# Patient Record
Sex: MALE | Race: WHITE | Employment: OTHER | URBAN - METROPOLITAN AREA
[De-identification: names, ages, dates, MRNs, and addresses within clinical notes are randomized per-mention and may not be internally consistent; named-entity substitution may affect disease eponyms.]

---

## 2017-03-23 ENCOUNTER — HOSPITAL ENCOUNTER (OUTPATIENT)
Dept: GENERAL RADIOLOGY | Age: 77
Discharge: HOME | End: 2017-03-23
Attending: INTERNAL MEDICINE
Payer: MEDICARE

## 2017-03-23 ENCOUNTER — HOSPITAL ENCOUNTER (OUTPATIENT)
Dept: NON INVASIVE DIAGNOSTICS | Age: 77
Discharge: HOME | End: 2017-03-23
Attending: INTERNAL MEDICINE
Payer: MEDICARE

## 2017-03-23 VITALS — WEIGHT: 144 LBS | HEIGHT: 68 IN | BODY MASS INDEX: 21.82 KG/M2

## 2017-03-23 DIAGNOSIS — R06.02 SOB (SHORTNESS OF BREATH): ICD-10-CM

## 2017-03-23 DIAGNOSIS — I35.0 AORTIC STENOSIS: ICD-10-CM

## 2017-03-23 LAB
ATTENDING PHYSICIAN, CST07: NORMAL
DIAGNOSIS, 93000: NORMAL
DUKE TM SCORE RESULT, CST14: NORMAL
DUKE TREADMILL SCORE, CST13: NORMAL
ECG INTERP BEFORE EX, CST11: NORMAL
ECG INTERP DURING EX, CST12: NORMAL
FUNCTIONAL CAPACITY, CST17: NORMAL
KNOWN CARDIAC CONDITION, CST08: NORMAL
MAX. DIASTOLIC BP, CST04: 78 MMHG
MAX. HEART RATE, CST05: 78 BPM
MAX. SYSTOLIC BP, CST03: 167 MMHG
OVERALL BP RESPONSE TO EXERCISE, CST16: NORMAL
OVERALL HR RESPONSE TO EXERCISE, CST15: NORMAL
PEAK EX METS, CST10: 1 METS
PROTOCOL NAME, CST01: NORMAL
TEST INDICATION, CST09: NORMAL

## 2017-03-23 PROCEDURE — 71020 XR CHEST PA LAT: CPT

## 2017-03-23 PROCEDURE — 93351 STRESS TTE COMPLETE: CPT

## 2017-03-23 PROCEDURE — 74011250636 HC RX REV CODE- 250/636: Performed by: INTERNAL MEDICINE

## 2017-03-23 RX ORDER — SODIUM CHLORIDE 0.9 % (FLUSH) 0.9 %
10 SYRINGE (ML) INJECTION
Status: COMPLETED | OUTPATIENT
Start: 2017-03-23 | End: 2017-03-23

## 2017-03-23 RX ORDER — DOBUTAMINE HYDROCHLORIDE 200 MG/100ML
10-40 INJECTION INTRAVENOUS
Status: COMPLETED | OUTPATIENT
Start: 2017-03-23 | End: 2017-03-23

## 2017-03-23 RX ADMIN — Medication 10 ML: at 09:59

## 2017-03-23 RX ADMIN — DOBUTAMINE HYDROCHLORIDE 6.2 MCG/KG/MIN: 200 INJECTION INTRAVENOUS at 09:59

## 2017-07-19 ENCOUNTER — TELEPHONE (OUTPATIENT)
Dept: CARDIAC REHAB | Age: 77
End: 2017-07-19

## 2017-07-19 NOTE — TELEPHONE ENCOUNTER
7/19/2017 Cardiac Wellness: Called Mr. Sukhjinder Parks to remind of intake appointment on Thursday, July 20, 2017. Left voicemail message. Provided pt with contact information for [facility]. Also, reminded pt to bring a list of current medications, a personal schedule, and to wear comfortable clothes and shoes.  Hugo Mendiola

## 2017-07-20 ENCOUNTER — HOSPITAL ENCOUNTER (OUTPATIENT)
Dept: CARDIAC REHAB | Age: 77
Discharge: HOME | End: 2017-07-20
Payer: MEDICARE

## 2017-07-20 VITALS — HEIGHT: 68 IN | WEIGHT: 138 LBS | BODY MASS INDEX: 20.92 KG/M2

## 2017-07-20 VITALS — WEIGHT: 138 LBS | BODY MASS INDEX: 21.29 KG/M2

## 2017-07-20 PROCEDURE — 93798 PHYS/QHP OP CAR RHAB W/ECG: CPT

## 2017-07-20 RX ORDER — ATORVASTATIN CALCIUM 20 MG/1
20 TABLET, FILM COATED ORAL DAILY
COMMUNITY
End: 2018-05-17 | Stop reason: DRUGHIGH

## 2017-07-20 RX ORDER — CARVEDILOL 12.5 MG/1
12.5 TABLET ORAL 2 TIMES DAILY WITH MEALS
COMMUNITY

## 2017-07-20 RX ORDER — COLCHICINE 0.6 MG/1
0.6 TABLET ORAL AS NEEDED
COMMUNITY

## 2017-07-20 RX ORDER — POTASSIUM CITRATE 15 MEQ/1
20 TABLET, EXTENDED RELEASE ORAL
COMMUNITY
End: 2017-07-20 | Stop reason: CLARIF

## 2017-07-20 RX ORDER — FUROSEMIDE 20 MG/1
20 TABLET ORAL
COMMUNITY

## 2017-07-20 RX ORDER — POTASSIUM CHLORIDE 1500 MG/1
20 TABLET, FILM COATED, EXTENDED RELEASE ORAL
COMMUNITY

## 2017-07-20 RX ORDER — LISINOPRIL 5 MG/1
5 TABLET ORAL DAILY
COMMUNITY

## 2017-07-20 RX ORDER — EZETIMIBE 10 MG/1
10 TABLET ORAL DAILY
COMMUNITY

## 2017-07-20 RX ORDER — PRASUGREL 10 MG/1
10 TABLET, FILM COATED ORAL DAILY
COMMUNITY

## 2017-07-20 RX ORDER — GUAIFENESIN 100 MG/5ML
LIQUID (ML) ORAL DAILY
COMMUNITY

## 2017-07-20 NOTE — CARDIO/PULMONARY
Janell Barnhart  67 y.o. presented to cardiac wellness for orientation and exercise tolerance test today with a primary diagnosis of TAVR 06/30/17. His EF is 25 -30%, refuses ICD. Janell Barnhart has a cardiac history of MI '89, CABG x 4 2009, HF - ischemic CMP, PAD with bilat. stents, mild bilateral carotid stenosis. Cardiac risk factors include dyslipidemia, hypertension, prior MI, valvular heart disease and these were reviewed with Pia Grace. Janell Barnhart lives with his supportive wife. He is a retired professor and an avid cyclist.    ARIANNE-D, depression score, is 1 and this is considered normal.  Patient denied chest pain or SOB during 6 minute walk and was in SB/SR 1 AVB BBB w/o ectopy. Janell Barnhart will attend a 60 minute class once a week and exercise 3 days a week in cardiac wellness. He attended our cardiac rehab program in 2009.    Magda Tucker RN  7/20/2017

## 2017-07-24 ENCOUNTER — APPOINTMENT (OUTPATIENT)
Dept: CARDIAC REHAB | Age: 77
End: 2017-07-24
Payer: MEDICARE

## 2017-07-25 ENCOUNTER — HOSPITAL ENCOUNTER (OUTPATIENT)
Dept: CARDIAC REHAB | Age: 77
Discharge: HOME | End: 2017-07-25
Payer: MEDICARE

## 2017-07-25 VITALS — BODY MASS INDEX: 21.45 KG/M2 | WEIGHT: 139 LBS

## 2017-07-25 PROCEDURE — 93798 PHYS/QHP OP CAR RHAB W/ECG: CPT

## 2017-07-27 ENCOUNTER — HOSPITAL ENCOUNTER (OUTPATIENT)
Dept: CARDIAC REHAB | Age: 77
Discharge: HOME | End: 2017-07-27
Payer: MEDICARE

## 2017-07-27 VITALS — WEIGHT: 138 LBS | BODY MASS INDEX: 21.29 KG/M2

## 2017-07-27 PROCEDURE — 93797 PHYS/QHP OP CAR RHAB WO ECG: CPT | Performed by: DIETITIAN, REGISTERED

## 2017-07-27 PROCEDURE — 93798 PHYS/QHP OP CAR RHAB W/ECG: CPT

## 2017-08-01 ENCOUNTER — HOSPITAL ENCOUNTER (OUTPATIENT)
Dept: CARDIAC REHAB | Age: 77
Discharge: HOME | End: 2017-08-01
Payer: MEDICARE

## 2017-08-01 VITALS — BODY MASS INDEX: 21.14 KG/M2 | WEIGHT: 137 LBS

## 2017-08-01 PROCEDURE — 93798 PHYS/QHP OP CAR RHAB W/ECG: CPT

## 2017-08-03 ENCOUNTER — HOSPITAL ENCOUNTER (OUTPATIENT)
Dept: CARDIAC REHAB | Age: 77
Discharge: HOME | End: 2017-08-03
Payer: MEDICARE

## 2017-08-03 VITALS — BODY MASS INDEX: 21.29 KG/M2 | WEIGHT: 138 LBS

## 2017-08-03 PROCEDURE — 93797 PHYS/QHP OP CAR RHAB WO ECG: CPT | Performed by: DIETITIAN, REGISTERED

## 2017-08-03 PROCEDURE — 93798 PHYS/QHP OP CAR RHAB W/ECG: CPT | Performed by: DIETITIAN, REGISTERED

## 2017-08-07 ENCOUNTER — HOSPITAL ENCOUNTER (OUTPATIENT)
Dept: CARDIAC REHAB | Age: 77
Discharge: HOME | End: 2017-08-07
Payer: MEDICARE

## 2017-08-07 VITALS — BODY MASS INDEX: 21.14 KG/M2 | WEIGHT: 137 LBS

## 2017-08-07 PROCEDURE — 93798 PHYS/QHP OP CAR RHAB W/ECG: CPT

## 2017-08-08 ENCOUNTER — HOSPITAL ENCOUNTER (OUTPATIENT)
Dept: CARDIAC REHAB | Age: 77
Discharge: HOME | End: 2017-08-08
Payer: MEDICARE

## 2017-08-08 VITALS — WEIGHT: 138 LBS | BODY MASS INDEX: 21.29 KG/M2

## 2017-08-08 PROCEDURE — 93798 PHYS/QHP OP CAR RHAB W/ECG: CPT

## 2017-08-10 ENCOUNTER — HOSPITAL ENCOUNTER (OUTPATIENT)
Dept: CARDIAC REHAB | Age: 77
Discharge: HOME | End: 2017-08-10
Payer: MEDICARE

## 2017-08-10 VITALS — WEIGHT: 138 LBS | BODY MASS INDEX: 21.29 KG/M2

## 2017-08-10 PROCEDURE — 93798 PHYS/QHP OP CAR RHAB W/ECG: CPT

## 2017-08-10 PROCEDURE — 93797 PHYS/QHP OP CAR RHAB WO ECG: CPT | Performed by: DIETITIAN, REGISTERED

## 2017-08-14 ENCOUNTER — HOSPITAL ENCOUNTER (OUTPATIENT)
Dept: CARDIAC REHAB | Age: 77
Discharge: HOME | End: 2017-08-14
Payer: MEDICARE

## 2017-08-14 VITALS — WEIGHT: 138 LBS | BODY MASS INDEX: 21.29 KG/M2

## 2017-08-14 PROCEDURE — 93798 PHYS/QHP OP CAR RHAB W/ECG: CPT

## 2017-08-15 ENCOUNTER — HOSPITAL ENCOUNTER (OUTPATIENT)
Dept: CARDIAC REHAB | Age: 77
Discharge: HOME | End: 2017-08-15
Payer: MEDICARE

## 2017-08-15 VITALS — WEIGHT: 138 LBS | BODY MASS INDEX: 21.29 KG/M2

## 2017-08-15 PROCEDURE — 93798 PHYS/QHP OP CAR RHAB W/ECG: CPT

## 2017-08-17 ENCOUNTER — HOSPITAL ENCOUNTER (OUTPATIENT)
Dept: CARDIAC REHAB | Age: 77
Discharge: HOME | End: 2017-08-17
Payer: MEDICARE

## 2017-08-17 VITALS — WEIGHT: 138 LBS | BODY MASS INDEX: 21.29 KG/M2

## 2017-08-17 PROCEDURE — 93798 PHYS/QHP OP CAR RHAB W/ECG: CPT | Performed by: DIETITIAN, REGISTERED

## 2017-08-17 PROCEDURE — 93797 PHYS/QHP OP CAR RHAB WO ECG: CPT

## 2017-08-21 ENCOUNTER — APPOINTMENT (OUTPATIENT)
Dept: CARDIAC REHAB | Age: 77
End: 2017-08-21
Payer: MEDICARE

## 2017-08-22 ENCOUNTER — APPOINTMENT (OUTPATIENT)
Dept: CARDIAC REHAB | Age: 77
End: 2017-08-22
Payer: MEDICARE

## 2017-08-24 ENCOUNTER — HOSPITAL ENCOUNTER (OUTPATIENT)
Dept: CARDIAC REHAB | Age: 77
Discharge: HOME | End: 2017-08-24
Payer: MEDICARE

## 2017-08-24 VITALS — WEIGHT: 139 LBS | BODY MASS INDEX: 21.45 KG/M2

## 2017-08-24 PROCEDURE — 93797 PHYS/QHP OP CAR RHAB WO ECG: CPT

## 2017-08-24 PROCEDURE — 93798 PHYS/QHP OP CAR RHAB W/ECG: CPT

## 2017-08-28 ENCOUNTER — HOSPITAL ENCOUNTER (OUTPATIENT)
Dept: CARDIAC REHAB | Age: 77
Discharge: HOME | End: 2017-08-28
Payer: MEDICARE

## 2017-08-28 VITALS — BODY MASS INDEX: 21.29 KG/M2 | WEIGHT: 138 LBS

## 2017-08-28 PROCEDURE — 93798 PHYS/QHP OP CAR RHAB W/ECG: CPT

## 2017-08-29 ENCOUNTER — HOSPITAL ENCOUNTER (OUTPATIENT)
Dept: CARDIAC REHAB | Age: 77
Discharge: HOME | End: 2017-08-29
Payer: MEDICARE

## 2017-08-29 VITALS — BODY MASS INDEX: 21.45 KG/M2 | WEIGHT: 139 LBS

## 2017-08-29 PROCEDURE — 93798 PHYS/QHP OP CAR RHAB W/ECG: CPT

## 2017-08-31 ENCOUNTER — HOSPITAL ENCOUNTER (OUTPATIENT)
Dept: CARDIAC REHAB | Age: 77
Discharge: HOME | End: 2017-08-31
Payer: MEDICARE

## 2017-08-31 VITALS — BODY MASS INDEX: 21.29 KG/M2 | WEIGHT: 138 LBS

## 2017-08-31 PROCEDURE — 93798 PHYS/QHP OP CAR RHAB W/ECG: CPT | Performed by: DIETITIAN, REGISTERED

## 2017-08-31 PROCEDURE — 93797 PHYS/QHP OP CAR RHAB WO ECG: CPT | Performed by: DIETITIAN, REGISTERED

## 2017-09-05 ENCOUNTER — HOSPITAL ENCOUNTER (OUTPATIENT)
Dept: CARDIAC REHAB | Age: 77
Discharge: HOME | End: 2017-09-05
Payer: MEDICARE

## 2017-09-05 ENCOUNTER — APPOINTMENT (OUTPATIENT)
Dept: CARDIAC REHAB | Age: 77
End: 2017-09-05
Payer: MEDICARE

## 2017-09-05 VITALS — WEIGHT: 139 LBS | BODY MASS INDEX: 21.45 KG/M2

## 2017-09-05 PROCEDURE — 93798 PHYS/QHP OP CAR RHAB W/ECG: CPT

## 2017-09-07 ENCOUNTER — HOSPITAL ENCOUNTER (OUTPATIENT)
Dept: CARDIAC REHAB | Age: 77
Discharge: HOME | End: 2017-09-07
Payer: MEDICARE

## 2017-09-07 VITALS — BODY MASS INDEX: 21.29 KG/M2 | WEIGHT: 138 LBS

## 2017-09-07 PROCEDURE — 93798 PHYS/QHP OP CAR RHAB W/ECG: CPT | Performed by: DIETITIAN, REGISTERED

## 2017-09-07 PROCEDURE — 93797 PHYS/QHP OP CAR RHAB WO ECG: CPT | Performed by: DIETITIAN, REGISTERED

## 2017-09-11 ENCOUNTER — HOSPITAL ENCOUNTER (OUTPATIENT)
Dept: CARDIAC REHAB | Age: 77
Discharge: HOME | End: 2017-09-11
Payer: MEDICARE

## 2017-09-11 VITALS — WEIGHT: 139 LBS | BODY MASS INDEX: 21.45 KG/M2

## 2017-09-11 PROCEDURE — 93798 PHYS/QHP OP CAR RHAB W/ECG: CPT

## 2017-09-11 NOTE — CARDIO/PULMONARY
Pt came in to exercise today and reported that on his 25 mile bike ride this past Friday he ran into an issue the last 4 miles. He experienced sudden fatigue, that he \"ran out of gas. \" Happened to look down and his fit bit reported a HR in the 50's, normally >100. Took his medications, ate, was hydrated. Pt did another 25 mile bike ride on Sunday and felt fine. Instructed pt to report this to Dr. Lopez Ellison (pt's cardiologist) and pt agreed to call today.

## 2017-09-12 ENCOUNTER — HOSPITAL ENCOUNTER (OUTPATIENT)
Dept: CARDIAC REHAB | Age: 77
Discharge: HOME | End: 2017-09-12
Payer: MEDICARE

## 2017-09-12 ENCOUNTER — APPOINTMENT (OUTPATIENT)
Dept: CARDIAC REHAB | Age: 77
End: 2017-09-12
Payer: MEDICARE

## 2017-09-12 VITALS — WEIGHT: 139 LBS | BODY MASS INDEX: 21.45 KG/M2

## 2017-09-12 PROCEDURE — 93798 PHYS/QHP OP CAR RHAB W/ECG: CPT

## 2017-09-14 ENCOUNTER — HOSPITAL ENCOUNTER (OUTPATIENT)
Dept: CARDIAC REHAB | Age: 77
Discharge: HOME | End: 2017-09-14
Payer: MEDICARE

## 2017-09-14 VITALS — BODY MASS INDEX: 21.29 KG/M2 | WEIGHT: 138 LBS

## 2017-09-14 PROCEDURE — 93798 PHYS/QHP OP CAR RHAB W/ECG: CPT | Performed by: DIETITIAN, REGISTERED

## 2017-09-14 PROCEDURE — 93797 PHYS/QHP OP CAR RHAB WO ECG: CPT

## 2017-09-18 ENCOUNTER — HOSPITAL ENCOUNTER (OUTPATIENT)
Dept: CARDIAC REHAB | Age: 77
Discharge: HOME | End: 2017-09-18
Payer: MEDICARE

## 2017-09-18 VITALS — WEIGHT: 137 LBS | BODY MASS INDEX: 21.14 KG/M2

## 2017-09-18 PROCEDURE — 93798 PHYS/QHP OP CAR RHAB W/ECG: CPT

## 2017-09-19 ENCOUNTER — HOSPITAL ENCOUNTER (OUTPATIENT)
Dept: CARDIAC REHAB | Age: 77
Discharge: HOME | End: 2017-09-19
Payer: MEDICARE

## 2017-09-19 ENCOUNTER — APPOINTMENT (OUTPATIENT)
Dept: CARDIAC REHAB | Age: 77
End: 2017-09-19
Payer: MEDICARE

## 2017-09-19 VITALS — BODY MASS INDEX: 21.14 KG/M2 | WEIGHT: 137 LBS

## 2017-09-19 PROCEDURE — 93798 PHYS/QHP OP CAR RHAB W/ECG: CPT

## 2017-09-21 ENCOUNTER — HOSPITAL ENCOUNTER (OUTPATIENT)
Dept: CARDIAC REHAB | Age: 77
Discharge: HOME | End: 2017-09-21
Payer: MEDICARE

## 2017-09-21 VITALS — BODY MASS INDEX: 21.14 KG/M2 | WEIGHT: 137 LBS

## 2017-09-21 PROCEDURE — 93797 PHYS/QHP OP CAR RHAB WO ECG: CPT | Performed by: DIETITIAN, REGISTERED

## 2017-09-21 PROCEDURE — 93798 PHYS/QHP OP CAR RHAB W/ECG: CPT | Performed by: DIETITIAN, REGISTERED

## 2017-09-21 NOTE — CARDIO/PULMONARY
The following was faxed to Dr. Wan Caldwell:    Dr. Wan Caldwell,    Mr. Kayla Soriano (s/p TAVR 6/30/17) attends cardiac rehab. He has had more PVCs recently than usual. Asymptomatic, BP pre-exercise 100/60, post 94/53. Currently taking:  Coreg 12.5mg BID  Lisinopril 5mg daily  Lasix 20mg PRN  - Pt takes only the coreg before exercising    Attached is todays session report for you to review. Wanted you to be aware of pts rhythm change.      Thank you,    Jagdeep Mcleod RN

## 2017-09-25 ENCOUNTER — HOSPITAL ENCOUNTER (OUTPATIENT)
Dept: CARDIAC REHAB | Age: 77
Discharge: HOME | End: 2017-09-25
Payer: MEDICARE

## 2017-09-25 VITALS — BODY MASS INDEX: 21.29 KG/M2 | WEIGHT: 138 LBS

## 2017-09-25 PROCEDURE — 93798 PHYS/QHP OP CAR RHAB W/ECG: CPT

## 2017-09-26 ENCOUNTER — HOSPITAL ENCOUNTER (OUTPATIENT)
Dept: CARDIAC REHAB | Age: 77
Discharge: HOME | End: 2017-09-26
Payer: MEDICARE

## 2017-09-26 ENCOUNTER — APPOINTMENT (OUTPATIENT)
Dept: CARDIAC REHAB | Age: 77
End: 2017-09-26
Payer: MEDICARE

## 2017-09-26 VITALS — WEIGHT: 139 LBS | BODY MASS INDEX: 21.45 KG/M2

## 2017-09-26 PROCEDURE — 93798 PHYS/QHP OP CAR RHAB W/ECG: CPT

## 2017-09-28 ENCOUNTER — HOSPITAL ENCOUNTER (OUTPATIENT)
Dept: CARDIAC REHAB | Age: 77
Discharge: HOME | End: 2017-09-28
Payer: MEDICARE

## 2017-09-28 VITALS — BODY MASS INDEX: 21.45 KG/M2 | WEIGHT: 139 LBS

## 2017-09-28 PROCEDURE — 93798 PHYS/QHP OP CAR RHAB W/ECG: CPT

## 2017-10-02 ENCOUNTER — TELEPHONE (OUTPATIENT)
Dept: CARDIAC REHAB | Age: 77
End: 2017-10-02

## 2017-10-02 ENCOUNTER — HOSPITAL ENCOUNTER (OUTPATIENT)
Dept: CARDIAC REHAB | Age: 77
Discharge: HOME | End: 2017-10-02
Payer: MEDICARE

## 2017-10-02 VITALS — WEIGHT: 140 LBS | BODY MASS INDEX: 21.6 KG/M2

## 2017-10-02 NOTE — TELEPHONE ENCOUNTER
10/2/17 Cardiac Wellness: Mr. Iram Dalton canceled October 9, 2017 appointment d/t out of town. Will return next scheduled appointment.  Gui De La Torre

## 2017-10-03 ENCOUNTER — APPOINTMENT (OUTPATIENT)
Dept: CARDIAC REHAB | Age: 77
End: 2017-10-03
Payer: MEDICARE

## 2017-10-03 ENCOUNTER — HOSPITAL ENCOUNTER (OUTPATIENT)
Dept: CARDIAC REHAB | Age: 77
Discharge: HOME | End: 2017-10-03
Payer: MEDICARE

## 2017-10-05 ENCOUNTER — HOSPITAL ENCOUNTER (OUTPATIENT)
Dept: CARDIAC REHAB | Age: 77
Discharge: HOME | End: 2017-10-05
Payer: MEDICARE

## 2017-10-05 VITALS — BODY MASS INDEX: 21.76 KG/M2 | WEIGHT: 141 LBS

## 2017-10-09 ENCOUNTER — APPOINTMENT (OUTPATIENT)
Dept: CARDIAC REHAB | Age: 77
End: 2017-10-09
Payer: MEDICARE

## 2017-10-10 ENCOUNTER — APPOINTMENT (OUTPATIENT)
Dept: CARDIAC REHAB | Age: 77
End: 2017-10-10
Payer: MEDICARE

## 2017-10-10 ENCOUNTER — HOSPITAL ENCOUNTER (OUTPATIENT)
Dept: CARDIAC REHAB | Age: 77
Discharge: HOME | End: 2017-10-10
Payer: MEDICARE

## 2017-10-10 VITALS — WEIGHT: 140 LBS | BODY MASS INDEX: 21.6 KG/M2

## 2017-10-12 ENCOUNTER — HOSPITAL ENCOUNTER (OUTPATIENT)
Dept: CARDIAC REHAB | Age: 77
Discharge: HOME | End: 2017-10-12
Payer: MEDICARE

## 2017-10-12 ENCOUNTER — APPOINTMENT (OUTPATIENT)
Dept: CARDIAC REHAB | Age: 77
End: 2017-10-12
Payer: MEDICARE

## 2017-10-12 VITALS — BODY MASS INDEX: 21.6 KG/M2 | WEIGHT: 140 LBS

## 2017-10-16 ENCOUNTER — APPOINTMENT (OUTPATIENT)
Dept: CARDIAC REHAB | Age: 77
End: 2017-10-16
Payer: MEDICARE

## 2017-10-17 ENCOUNTER — HOSPITAL ENCOUNTER (OUTPATIENT)
Dept: CARDIAC REHAB | Age: 77
Discharge: HOME | End: 2017-10-17
Payer: MEDICARE

## 2017-10-17 VITALS — WEIGHT: 140 LBS | BODY MASS INDEX: 21.6 KG/M2

## 2017-10-17 VITALS — BODY MASS INDEX: 21.76 KG/M2 | WEIGHT: 141 LBS

## 2017-10-19 ENCOUNTER — HOSPITAL ENCOUNTER (OUTPATIENT)
Dept: CARDIAC REHAB | Age: 77
Discharge: HOME | End: 2017-10-19
Payer: MEDICARE

## 2017-10-19 VITALS — WEIGHT: 140 LBS | BODY MASS INDEX: 21.6 KG/M2

## 2017-10-23 ENCOUNTER — APPOINTMENT (OUTPATIENT)
Dept: CARDIAC REHAB | Age: 77
End: 2017-10-23
Payer: MEDICARE

## 2017-10-24 ENCOUNTER — HOSPITAL ENCOUNTER (OUTPATIENT)
Dept: CARDIAC REHAB | Age: 77
Discharge: HOME | End: 2017-10-24
Payer: MEDICARE

## 2017-10-24 VITALS — WEIGHT: 140 LBS | BODY MASS INDEX: 21.6 KG/M2

## 2017-10-26 ENCOUNTER — HOSPITAL ENCOUNTER (OUTPATIENT)
Dept: CARDIAC REHAB | Age: 77
Discharge: HOME | End: 2017-10-26
Payer: MEDICARE

## 2017-10-26 VITALS — WEIGHT: 138 LBS | BODY MASS INDEX: 21.29 KG/M2

## 2017-10-30 ENCOUNTER — APPOINTMENT (OUTPATIENT)
Dept: CARDIAC REHAB | Age: 77
End: 2017-10-30
Payer: MEDICARE

## 2017-10-31 ENCOUNTER — APPOINTMENT (OUTPATIENT)
Dept: CARDIAC REHAB | Age: 77
End: 2017-10-31
Payer: MEDICARE

## 2017-10-31 ENCOUNTER — HOSPITAL ENCOUNTER (OUTPATIENT)
Dept: CARDIAC REHAB | Age: 77
Discharge: HOME | End: 2017-10-31
Payer: MEDICARE

## 2017-10-31 VITALS — WEIGHT: 138 LBS | BODY MASS INDEX: 21.29 KG/M2

## 2017-10-31 PROCEDURE — 93798 PHYS/QHP OP CAR RHAB W/ECG: CPT

## 2017-10-31 NOTE — CARDIO/PULMONARY
Gabbie Silva  Completed phase II cardiac rehab and attended 36 sessions from 7/20/17 - 10/31/17. Gabbie Silva is interested in maintaining optimal health and will work with Dr. Vivien Hernandez MD.  Gabbie Silva has  improved his endurance and stamina through regular exercise, maintained his weight which part of his individualized nutrition plan, and lost 1.25 inches from his waist. Blood pressure is 100/58 and is WNL. He has also improved his nutrition, [employer] and depression scores and these were reviewed with patient. Gabbie Silva plans to continue exercising at his gym and plans to do regularly biking outside.   Goldie Key RN  10/31/2017

## 2017-11-02 ENCOUNTER — APPOINTMENT (OUTPATIENT)
Dept: CARDIAC REHAB | Age: 77
End: 2017-11-02

## 2017-11-02 ENCOUNTER — TELEPHONE (OUTPATIENT)
Dept: CARDIAC REHAB | Age: 77
End: 2017-11-02

## 2017-11-02 NOTE — TELEPHONE ENCOUNTER
11/2/2017 Cardiac Wellness:  Mr. Beth Sher called and left a voicemail Monday to cancel starting Phase 3 until January.   Rakesh Grimm

## 2017-11-07 ENCOUNTER — APPOINTMENT (OUTPATIENT)
Dept: CARDIAC REHAB | Age: 77
End: 2017-11-07

## 2017-11-09 ENCOUNTER — APPOINTMENT (OUTPATIENT)
Dept: CARDIAC REHAB | Age: 77
End: 2017-11-09

## 2017-11-14 ENCOUNTER — APPOINTMENT (OUTPATIENT)
Dept: CARDIAC REHAB | Age: 77
End: 2017-11-14

## 2017-11-16 ENCOUNTER — APPOINTMENT (OUTPATIENT)
Dept: CARDIAC REHAB | Age: 77
End: 2017-11-16

## 2017-11-21 ENCOUNTER — APPOINTMENT (OUTPATIENT)
Dept: CARDIAC REHAB | Age: 77
End: 2017-11-21

## 2017-11-28 ENCOUNTER — APPOINTMENT (OUTPATIENT)
Dept: CARDIAC REHAB | Age: 77
End: 2017-11-28

## 2017-11-30 ENCOUNTER — APPOINTMENT (OUTPATIENT)
Dept: CARDIAC REHAB | Age: 77
End: 2017-11-30

## 2018-05-17 ENCOUNTER — HOSPITAL ENCOUNTER (OUTPATIENT)
Dept: LAB | Age: 78
Discharge: HOME | End: 2018-05-17
Payer: MEDICARE

## 2018-05-17 ENCOUNTER — OFFICE VISIT (OUTPATIENT)
Dept: INTERNAL MEDICINE CLINIC | Age: 78
End: 2018-05-17

## 2018-05-17 VITALS
WEIGHT: 143.2 LBS | OXYGEN SATURATION: 98 % | HEART RATE: 59 BPM | HEIGHT: 67 IN | BODY MASS INDEX: 22.47 KG/M2 | RESPIRATION RATE: 17 BRPM | DIASTOLIC BLOOD PRESSURE: 70 MMHG | TEMPERATURE: 97.4 F | SYSTOLIC BLOOD PRESSURE: 120 MMHG

## 2018-05-17 DIAGNOSIS — Z95.2 S/P TAVR (TRANSCATHETER AORTIC VALVE REPLACEMENT): ICD-10-CM

## 2018-05-17 DIAGNOSIS — Z76.89 ENCOUNTER TO ESTABLISH CARE: Primary | ICD-10-CM

## 2018-05-17 DIAGNOSIS — I25.10 CORONARY ARTERY DISEASE INVOLVING NATIVE CORONARY ARTERY OF NATIVE HEART WITHOUT ANGINA PECTORIS: ICD-10-CM

## 2018-05-17 DIAGNOSIS — Z13.29 THYROID DISORDER SCREEN: ICD-10-CM

## 2018-05-17 PROCEDURE — 85025 COMPLETE CBC W/AUTO DIFF WBC: CPT

## 2018-05-17 PROCEDURE — 36415 COLL VENOUS BLD VENIPUNCTURE: CPT

## 2018-05-17 PROCEDURE — 84443 ASSAY THYROID STIM HORMONE: CPT

## 2018-05-17 PROCEDURE — 80061 LIPID PANEL: CPT

## 2018-05-17 PROCEDURE — 80053 COMPREHEN METABOLIC PANEL: CPT

## 2018-05-17 RX ORDER — ATORVASTATIN CALCIUM 40 MG/1
TABLET, FILM COATED ORAL
COMMUNITY
Start: 2018-04-20

## 2018-05-17 NOTE — MR AVS SNAPSHOT
[address] [facility] [address] [facility] 
[phone] Patient: Yung Oliva MRN: [id number] TYV:[id number]/3/6939 Visit Information Date & Time Provider Department Dept. Phone Encounter #  
 5/17/2018  9:30 AM Zee Duffy MD [facility] Internal Medicine [phone] [id number] Follow-up Instructions Return in about 2 months (around 7/17/2018) for Medicare Wellness Visit- 30 minute appointment. Your Appointments 7/16/2018 10:15 AM  
[facility] [address] ([address]) Appt Note: new pt physical  
 [id number] [address] [id number]  
[phone]  
  
   
 [id number] [address] [facility] [address] [id number] Upcoming Health Maintenance Date Due DTaP/Tdap/Td series (1 - Tdap) 9/3/1960 ZOSTER VACCINE AGE 60> 7/3/1999 MEDICARE YEARLY EXAM 3/20/2018 Influenza Age 5 to Adult 8/1/2018 GLAUCOMA SCREENING Q2Y 12/20/2019 Pneumococcal 65+ Low/Medium Risk (2 of 2 - PPSV23) 11/1/2021 Allergies as of 5/17/2018  Review Complete On: 5/17/2018 By: Zee Duffy MD  
  
 Severity Noted Reaction Type Reactions Niacin High 07/20/2017    Other (comments) Severe flushing Current Immunizations  Reviewed on 7/20/2017 Name Date Influenza Vaccine 10/1/2016 Pneumococcal Vaccine (Unspecified Type) 11/1/2016 Not reviewed this visit You Were Diagnosed With   
  
 Codes Comments Encounter to establish care    -  Primary ICD-10-CM: Z76.89 
ICD-9-CM: V65.8 Coronary artery disease involving native coronary artery of native heart without angina pectoris     ICD-10-CM: I25.10 ICD-9-CM: 414.01 S/P TAVR (transcatheter aortic valve replacement)     ICD-10-CM: H32.5 ICD-9-CM: V43.3 Thyroid disorder screen     ICD-10-CM: Z13.29 ICD-9-CM: V77.0 Vitals BP Pulse Temp Resp Height(growth percentile) Weight(growth percentile) 120/70 (BP 1 Location: Right arm, BP Patient Position: Sitting) (!) 59 97.4 °F (36.3 °C) (Oral) 17 5' 6.77\" (1.696 m) 143 lb 3.2 oz (65 kg) SpO2 BMI Smoking Status 98% 22.58 kg/m2 Never Smoker BMI and BSA Data Body Mass Index Body Surface Area  
 22.58 kg/m 2 1.75 m 2 Preferred Pharmacy Pharmacy Name Phone [id number] - [facility], [address] [place] [address] [phone] Your Updated Medication List  
  
   
This list is accurate as of 5/17/18 10:01 AM.  Always use your most recent med list.  
  
  
  
  
 aspirin 81 mg chewable tablet Take  by mouth daily. atorvastatin 40 mg tablet Commonly known as:  LIPITOR  
  
 carvedilol 12.5 mg tablet Commonly known as:  Marielena Stephanie Take 12.5 mg by mouth two (2) times daily (with meals). COLCRYS 0.6 mg tablet Generic drug:  colchicine Take 0.6 mg by mouth as needed (for gout). COQ10 (LIPOSOMAL UBIQUINOL) PO Take  by mouth. EFFIENT 10 mg tablet Generic drug:  prasugrel Take 10 mg by mouth daily. furosemide 20 mg tablet Commonly known as:  LASIX Take 20 mg by mouth daily as needed. lisinopril 5 mg tablet Commonly known as:  Daron Littler Take 5 mg by mouth daily. potassium chloride SR 20 mEq tablet Commonly known as:  K-TAB Take 20 mEq by mouth daily as needed (takes with lasix). ZETIA 10 mg tablet Generic drug:  ezetimibe Take 10 mg by mouth daily. We Performed the Following CBC WITH AUTOMATED DIFF [05376 CPT(R)] LIPID PANEL [56915 CPT(R)] METABOLIC PANEL, COMPREHENSIVE [14334 CPT(R)] TSH 3RD GENERATION [74643 CPT(R)] Follow-up Instructions Return in about 2 months (around 7/17/2018) for Medicare Wellness Visit- 30 minute appointment. Introducing [address]! Ling Lyon introduces [employer] patient portal. Now you can access parts of your medical record, email your doctor's office, and request medication refills online. 1. In your internet browser, go to [email or web address] 2. Click on the First Time User? Click Here link in the Sign In box. You will see the New Member Sign Up page. 3. Enter your [employer] Access Code exactly as it appears below. You will not need to use this code after youve completed the sign-up process. If you do not sign up before the expiration date, you must request a new code. · [employer] Access Code: [email or web address] Expires: 8/15/2018  9:17 AM 
 
4. Enter the last four digits of your Social Security Number (xxxx) and Date of Birth (mm/dd/yyyy) as indicated and click Submit. You will be taken to the next sign-up page. 5. Create a [employer] ID. This will be your [employer] login ID and cannot be changed, so think of one that is secure and easy to remember. 6. Create a [employer] password. You can change your password at any time. 7. Enter your Password Reset Question and Answer. This can be used at a later time if you forget your password. 8. Enter your e-mail address. You will receive e-mail notification when new information is available in [address] 9. Click Sign Up. You can now view and download portions of your medical record. 10. Click the Download Summary menu link to download a portable copy of your medical information. If you have questions, please visit the Frequently Asked Questions section of the [employer] website. Remember, [employer] is NOT to be used for urgent needs. For medical emergencies, dial 911. Now available from your iPhone and Android! Please provide this summary of care documentation to your next provider. Your primary care clinician is listed as Cara Irizarry. If you have any questions after today's visit, please call [phone].

## 2018-05-17 NOTE — PROGRESS NOTES
New Patient Evaluation    Fatimah Soto is a 65 y.o. male. They are here to establish care with the group and me as a primary care provider. He has a history of CAD. Had a quad bypass. 3-4 years ago had stents placed in both legs. Last year he had had an aortic bypass. He has done well since this. No present complaints. He had a hernia operation 6-7 years ago. He has had colonoscopies. Last was at 75. He will not require another. He has some gout. Controlled with colchicine when it flares. Has gotten Shingrix last year    There are no active problems to display for this patient. Current Outpatient Prescriptions   Medication Sig Dispense Refill    COQ10, LIPOSOMAL UBIQUINOL, PO Take  by mouth.  atorvastatin (LIPITOR) 40 mg tablet       aspirin 81 mg chewable tablet Take  by mouth daily.  carvedilol (COREG) 12.5 mg tablet Take 12.5 mg by mouth two (2) times daily (with meals).  colchicine (COLCRYS) 0.6 mg tablet Take 0.6 mg by mouth as needed (for gout).  prasugrel (EFFIENT) 10 mg tablet Take 10 mg by mouth daily.  furosemide (LASIX) 20 mg tablet Take 20 mg by mouth daily as needed.  lisinopril (PRINIVIL, ZESTRIL) 5 mg tablet Take 5 mg by mouth daily.  ezetimibe (ZETIA) 10 mg tablet Take 10 mg by mouth daily.  potassium chloride SR (K-TAB) 20 mEq tablet Take 20 mEq by mouth daily as needed (takes with lasix).        Allergies   Allergen Reactions    Niacin Other (comments)     Severe flushing       Past Medical History:   Diagnosis Date    Aortic stenosis     CAD (coronary artery disease) 1989    MI    Carotid stenosis     mild bilateral    Heart failure ([address]     ischemic cardiomyopathy    Hypertension      Past Surgical History:   Procedure Laterality Date    CABG, ARTERY-VEIN, FOUR  2009    CARDIAC SURG PROCEDURE UNLIST  06/30/2017    TAVR    HX HERNIA REPAIR  2012    VASCULAR SURGERY PROCEDURE UNLIST  2014    bilat popliteal stenting Family History   Problem Relation Age of Onset   [employer] Sudden Death Sister      cause unknown    Lung Disease Mother     Hypertension Father      Social History   Substance Use Topics    Smoking status: Never Smoker    Smokeless tobacco: Never Used    Alcohol use Yes      Comment: socially        Health Maintenance   Topic Date Due    DTaP/Tdap/Td series (1 - Tdap) 09/03/1960    ZOSTER VACCINE AGE 60>  07/03/1999    GLAUCOMA SCREENING Q2Y  09/03/2004    MEDICARE YEARLY EXAM  03/20/2018    Influenza Age 5 to Adult  08/01/2018    Pneumococcal 65+ Low/Medium Risk (2 of 2 - PPSV23) 11/01/2021       Review of Systems   Constitutional: Negative. Respiratory: Negative. Cardiovascular: Negative. Genitourinary: Negative. Visit Vitals    /70 (BP 1 Location: Right arm, BP Patient Position: Sitting)    Pulse (!) 59    Temp 97.4 °F (36.3 °C) (Oral)    Resp 17    Ht 5' 6.77\" (1.696 m)    Wt 143 lb 3.2 oz (65 kg)    SpO2 98%    BMI 22.58 kg/m2       Physical Exam   Constitutional: No distress. Cardiovascular: Normal rate and regular rhythm. Pulmonary/Chest: Effort normal and breath sounds normal.           ASSESSMENT/PLAN    Diagnoses and all orders for this visit:    1. Encounter to establish care    2. Coronary artery disease involving native coronary artery of native heart without angina pectoris  -     CBC WITH AUTOMATED DIFF  -     METABOLIC PANEL, COMPREHENSIVE  -     LIPID PANEL    3. S/P TAVR (transcatheter aortic valve replacement)    4. Thyroid disorder screen  -     TSH 3RD GENERATION      Follow-up Disposition:  Return in about 2 months (around 7/17/2018) for Medicare Wellness Visit- 30 minute appointment.    -Discussed with the patient to continue the current plan of care. We will obtain baseline labwork and determine if any adjustments need to be done. We will also await the records of the previous PCP to ascertain further details of the patient's history.  The patient agrees with and understands the plan of care. All questions have been answered.

## 2018-05-18 LAB
ALBUMIN SERPL-MCNC: 4.1 G/DL (ref 3.5–4.8)
ALBUMIN/GLOB SERPL: 1.5 {RATIO} (ref 1.2–2.2)
ALP SERPL-CCNC: 60 IU/L (ref 39–117)
ALT SERPL-CCNC: 45 IU/L (ref 0–44)
AST SERPL-CCNC: 50 IU/L (ref 0–40)
BASOPHILS # BLD AUTO: 0 X10E3/UL (ref 0–0.2)
BASOPHILS NFR BLD AUTO: 0 %
BILIRUB SERPL-MCNC: 0.6 MG/DL (ref 0–1.2)
BUN SERPL-MCNC: 25 MG/DL (ref 8–27)
BUN/CREAT SERPL: 23 (ref 10–24)
CALCIUM SERPL-MCNC: 9.2 MG/DL (ref 8.6–10.2)
CHLORIDE SERPL-SCNC: 103 MMOL/L (ref 96–106)
CHOLEST SERPL-MCNC: 131 MG/DL (ref 100–199)
CO2 SERPL-SCNC: 22 MMOL/L (ref 18–29)
CREAT SERPL-MCNC: 1.11 MG/DL (ref 0.76–1.27)
EOSINOPHIL # BLD AUTO: 0.4 X10E3/UL (ref 0–0.4)
EOSINOPHIL NFR BLD AUTO: 5 %
ERYTHROCYTE [DISTWIDTH] IN BLOOD BY AUTOMATED COUNT: 14.1 % (ref 12.3–15.4)
GFR SERPLBLD CREATININE-BSD FMLA CKD-EPI: 63 ML/MIN/1.73
GFR SERPLBLD CREATININE-BSD FMLA CKD-EPI: 73 ML/MIN/1.73
GLOBULIN SER CALC-MCNC: 2.8 G/DL (ref 1.5–4.5)
GLUCOSE SERPL-MCNC: 73 MG/DL (ref 65–99)
HCT VFR BLD AUTO: 41 % (ref 37.5–51)
HDLC SERPL-MCNC: 40 MG/DL
HGB BLD-MCNC: 13.5 G/DL (ref 13–17.7)
IMM GRANULOCYTES # BLD: 0 X10E3/UL (ref 0–0.1)
IMM GRANULOCYTES NFR BLD: 0 %
LDLC SERPL CALC-MCNC: 64 MG/DL (ref 0–99)
LYMPHOCYTES # BLD AUTO: 1.1 X10E3/UL (ref 0.7–3.1)
LYMPHOCYTES NFR BLD AUTO: 16 %
MCH RBC QN AUTO: 29.9 PG (ref 26.6–33)
MCHC RBC AUTO-ENTMCNC: 32.9 G/DL (ref 31.5–35.7)
MCV RBC AUTO: 91 FL (ref 79–97)
MONOCYTES # BLD AUTO: 0.8 X10E3/UL (ref 0.1–0.9)
MONOCYTES NFR BLD AUTO: 12 %
NEUTROPHILS # BLD AUTO: 4.5 X10E3/UL (ref 1.4–7)
NEUTROPHILS NFR BLD AUTO: 67 %
PLATELET # BLD AUTO: 195 X10E3/UL (ref 150–379)
POTASSIUM SERPL-SCNC: 4.4 MMOL/L (ref 3.5–5.2)
PROT SERPL-MCNC: 6.9 G/DL (ref 6–8.5)
RBC # BLD AUTO: 4.52 X10E6/UL (ref 4.14–5.8)
SODIUM SERPL-SCNC: 142 MMOL/L (ref 134–144)
TRIGL SERPL-MCNC: 134 MG/DL (ref 0–149)
TSH SERPL DL<=0.005 MIU/L-ACNC: 1.93 UIU/ML (ref 0.45–4.5)
VLDLC SERPL CALC-MCNC: 27 MG/DL (ref 5–40)
WBC # BLD AUTO: 6.8 X10E3/UL (ref 3.4–10.8)

## 2018-06-08 PROBLEM — I25.10 CORONARY ARTERY DISEASE INVOLVING NATIVE CORONARY ARTERY OF NATIVE HEART WITHOUT ANGINA PECTORIS: Status: ACTIVE | Noted: 2018-06-08

## 2018-06-08 PROBLEM — Z95.2 S/P TAVR (TRANSCATHETER AORTIC VALVE REPLACEMENT): Status: ACTIVE | Noted: 2018-06-08

## 2018-07-12 ENCOUNTER — OFFICE VISIT (OUTPATIENT)
Dept: INTERNAL MEDICINE CLINIC | Age: 78
End: 2018-07-12

## 2018-07-12 VITALS
DIASTOLIC BLOOD PRESSURE: 80 MMHG | RESPIRATION RATE: 17 BRPM | WEIGHT: 140.2 LBS | HEIGHT: 67 IN | TEMPERATURE: 97.3 F | HEART RATE: 63 BPM | SYSTOLIC BLOOD PRESSURE: 110 MMHG | BODY MASS INDEX: 22 KG/M2 | OXYGEN SATURATION: 96 %

## 2018-07-12 DIAGNOSIS — Z00.00 MEDICARE ANNUAL WELLNESS VISIT, SUBSEQUENT: Primary | ICD-10-CM

## 2018-07-12 NOTE — PROGRESS NOTES
This is a Subsequent Medicare Annual Wellness Visit providing Personalized Prevention Plan Services (PPPS) (Performed 12 months after initial AWV and PPPS )    I have reviewed the patient's medical history in detail and updated the computerized patient record. The patient reports no significant complaints today. He sees and follows up with cardiology regularly. He sees Dr. Yandel Savage for follow-up. He denies chest pain or shortness of breath at this time. We reviewed his health maintenance issues that are outstanding. Orders placed appropriately. History     Past Medical History:   Diagnosis Date    Aortic stenosis     CAD (coronary artery disease) 1989    MI    Carotid stenosis     mild bilateral    Heart failure (HCC)     ischemic cardiomyopathy    Hypertension       Past Surgical History:   Procedure Laterality Date    CABG, ARTERY-VEIN, FOUR  2009    CARDIAC SURG PROCEDURE UNLIST  06/30/2017    TAVR    HX HERNIA REPAIR  2012    VASCULAR SURGERY PROCEDURE UNLIST  2014    bilat popliteal stenting     Current Outpatient Prescriptions   Medication Sig Dispense Refill    COQ10, LIPOSOMAL UBIQUINOL, PO Take  by mouth.  atorvastatin (LIPITOR) 40 mg tablet       aspirin 81 mg chewable tablet Take  by mouth daily.  carvedilol (COREG) 12.5 mg tablet Take 12.5 mg by mouth two (2) times daily (with meals).  colchicine (COLCRYS) 0.6 mg tablet Take 0.6 mg by mouth as needed (for gout).  prasugrel (EFFIENT) 10 mg tablet Take 10 mg by mouth daily.  furosemide (LASIX) 20 mg tablet Take 20 mg by mouth daily as needed.  lisinopril (PRINIVIL, ZESTRIL) 5 mg tablet Take 5 mg by mouth daily.  ezetimibe (ZETIA) 10 mg tablet Take 10 mg by mouth daily.  potassium chloride SR (K-TAB) 20 mEq tablet Take 20 mEq by mouth daily as needed (takes with lasix).        Allergies   Allergen Reactions    Niacin Other (comments)     Severe flushing       Family History   Problem Relation Age of Onset   [facility] Sudden Death Sister      cause unknown    Lung Disease Mother     Hypertension Father      Social History   Substance Use Topics    Smoking status: Never Smoker    Smokeless tobacco: Never Used    Alcohol use Yes      Comment: socially     Patient Active Problem List   Diagnosis Code    Coronary artery disease involving native coronary artery of native heart without angina pectoris I25.10    S/P TAVR (transcatheter aortic valve replacement) Z95.2       Depression Risk Factor Screening:     PHQ over the last two weeks 5/17/2018   Little interest or pleasure in doing things Not at all   Feeling down, depressed or hopeless Not at all   Total Score PHQ 2 0     Alcohol Risk Factor Screening: You do not drink alcohol or very rarely. Functional Ability and Level of Safety:     Hearing Loss   Hearing is good. Activities of Daily Living   Self-care. Requires assistance with: no ADLs    Fall Risk     Fall Risk Assessment, last 12 mths 5/17/2018   Able to walk? Yes   Fall in past 12 months? No     Abuse Screen   Patient is not abused    Review of Systems   A comprehensive review of systems was negative except for that written in the HPI.     Physical Examination     Evaluation of Cognitive Function:  Mood/affect:  happy  Appearance: age appropriate  Family member/caregiver input: n/a    Visit Vitals    /80 (BP 1 Location: Right arm, BP Patient Position: Sitting)    Pulse 63    Temp 97.3 °F (36.3 °C) (Oral)    Resp 17    Ht 5' 6.77\" (1.696 m)    Wt 140 lb 3.2 oz (63.6 kg)    SpO2 96%    BMI 22.11 kg/m2     General appearance: alert, cooperative, no distress, appears stated age  Lungs: clear to auscultation bilaterally  Heart: regular rate and rhythm, S1, S2 normal, no murmur, click, rub or gallop    Patient Care Team:  Aggie Gates MD as PCP - General (Internal Medicine)    Advice/Referrals/Counseling   Education and counseling provided:  Are appropriate based on today's review and evaluation      Assessment/Plan   Diagnoses and all orders for this visit:    1. Medicare annual wellness visit, subsequent  -     diphtheria-pertussis, acellular,-tetanus (BOOSTRIX TDAP) 2.5-8-5 Lf-mcg-Lf/0.5mL susp susp; 0.5 mL by IntraMUSCular route once for 1 dose.  -     varicella-zoster recombinant, PF, (SHINGRIX, PF,) 50 mcg/0.5 mL susr injection; 0.5 mL by IntraMUSCular route once for 1 dose. Follow-up Disposition:  Return in about 6 months (around 1/12/2019). Jayde Luna

## 2019-03-21 ENCOUNTER — HOSPITAL ENCOUNTER (OUTPATIENT)
Dept: MRI IMAGING | Age: 79
Discharge: HOME | End: 2019-03-21
Attending: FAMILY MEDICINE
Payer: MEDICARE

## 2019-03-21 DIAGNOSIS — M12.812 ROTATOR CUFF ARTHROPATHY OF LEFT SHOULDER: ICD-10-CM

## 2019-03-21 DIAGNOSIS — M19.012 PRIMARY OSTEOARTHRITIS OF LEFT SHOULDER: ICD-10-CM

## 2019-03-21 PROCEDURE — 73221 MRI JOINT UPR EXTREM W/O DYE: CPT

## 2019-09-03 ENCOUNTER — OFFICE VISIT (OUTPATIENT)
Dept: INTERNAL MEDICINE CLINIC | Age: 79
End: 2019-09-03

## 2019-09-03 VITALS
RESPIRATION RATE: 18 BRPM | HEIGHT: 67 IN | OXYGEN SATURATION: 97 % | WEIGHT: 142 LBS | TEMPERATURE: 97.4 F | SYSTOLIC BLOOD PRESSURE: 102 MMHG | BODY MASS INDEX: 22.29 KG/M2 | HEART RATE: 72 BPM | DIASTOLIC BLOOD PRESSURE: 72 MMHG

## 2019-09-03 DIAGNOSIS — Z00.00 MEDICARE ANNUAL WELLNESS VISIT, SUBSEQUENT: Primary | ICD-10-CM

## 2019-09-03 NOTE — PROGRESS NOTES
1. Have you been to the ER, urgent care clinic since your last visit? Hospitalized since your last visit? No    2. Have you seen or consulted any other health care providers outside of the [address] [place] [address] since your last visit? Include any pap smears or colon screening.  No

## 2019-09-03 NOTE — PATIENT INSTRUCTIONS
Medicare Wellness Visit, Male    The best way to live healthy is to have a lifestyle where you eat a well-balanced diet, exercise regularly, limit alcohol use, and quit all forms of tobacco/nicotine, if applicable. Regular preventive services are another way to keep healthy. Preventive services (vaccines, screening tests, monitoring & exams) can help personalize your care plan, which helps you manage your own care. Screening tests can find health problems at the earliest stages, when they are easiest to treat. [facility] follows the current, evidence-based guidelines published by the [facility] Surinder ([facility]) when recommending preventive services for our patients. Because we follow these guidelines, sometimes recommendations change over time as research supports it. (For example, a prostate screening blood test is no longer routinely recommended for men with no symptoms.)  Of course, you and your doctor may decide to screen more often for some diseases, based on your risk and co-morbidities (chronic disease you are already diagnosed with). Preventive services for you include:  - Medicare offers their members a free annual wellness visit, which is time for you and your primary care provider to discuss and plan for your preventive service needs. Take advantage of this benefit every year!  -All adults over age 72 should receive the recommended pneumonia vaccines. Current USPSTF guidelines recommend a series of two vaccines for the best pneumonia protection.   -All adults should have a flu vaccine yearly and an ECG.  All adults age 61 and older should receive a shingles vaccine once in their lifetime.    -All adults age 38-68 who are overweight should have a diabetes screening test once every three years.   -Other screening tests & preventive services for persons with diabetes include: an eye exam to screen for diabetic retinopathy, a kidney function test, a foot exam, and stricter control over your cholesterol.   -Cardiovascular screening for adults with routine risk involves an electrocardiogram (ECG) at intervals determined by the provider.   -Colorectal cancer screening should be done for adults age 54-65 with no increased risk factors for colorectal cancer. There are a number of acceptable methods of screening for this type of cancer. Each test has its own benefits and drawbacks. Discuss with your provider what is most appropriate for you during your annual wellness visit. The different tests include: colonoscopy (considered the best screening method), a fecal occult blood test, a fecal DNA test, and sigmoidoscopy.  -All adults born between [facility] should be screened once for Hepatitis C.  -An Abdominal Aortic Aneurysm (AAA) Screening is recommended for men age 73-68 who has ever smoked in their lifetime.      Here is a list of your current Health Maintenance items (your personalized list of preventive services) with a due date:  Health Maintenance Due   Topic Date Due    Shingles Vaccine (2 of 2) 09/25/2018    Annual Well Visit  07/13/2019    Flu Vaccine  08/01/2019

## 2019-09-03 NOTE — PROGRESS NOTES
This is the Subsequent Medicare Annual Wellness Exam, performed 12 months or more after the Initial AWV or the last Subsequent AWV    I have reviewed the patient's medical history in detail and updated the computerized patient record. Feeling well, no complaints. He has seen cardiology recently. Per Dr. Angella De Jesus, he is well compensated and doing well with his CHF. He turns [de-identified] y/o today. He is active, bicycles regularly and has traveled extensively this summer. Reviewed health maintenance. He will need to restart his Shingrix series as he had one shot last July but did not get the second. History     Past Medical History:   Diagnosis Date    Aortic stenosis     CAD (coronary artery disease) 1989    MI    Carotid stenosis     mild bilateral    Heart failure (HCC)     ischemic cardiomyopathy    Hypertension       Past Surgical History:   Procedure Laterality Date    CABG, ARTERY-VEIN, FOUR  2009    CARDIAC SURG PROCEDURE UNLIST  06/30/2017    TAVR    HX HERNIA REPAIR  2012    VASCULAR SURGERY PROCEDURE UNLIST  2014    bilat popliteal stenting     Current Outpatient Medications   Medication Sig Dispense Refill    COQ10, LIPOSOMAL UBIQUINOL, PO Take  by mouth.  atorvastatin (LIPITOR) 40 mg tablet       aspirin 81 mg chewable tablet Take  by mouth daily.  carvedilol (COREG) 12.5 mg tablet Take 12.5 mg by mouth two (2) times daily (with meals).  colchicine (COLCRYS) 0.6 mg tablet Take 0.6 mg by mouth as needed (for gout).  prasugrel (EFFIENT) 10 mg tablet Take 10 mg by mouth daily.  furosemide (LASIX) 20 mg tablet Take 20 mg by mouth daily as needed.  lisinopril (PRINIVIL, ZESTRIL) 5 mg tablet Take 5 mg by mouth daily.  ezetimibe (ZETIA) 10 mg tablet Take 10 mg by mouth daily.  potassium chloride SR (K-TAB) 20 mEq tablet Take 20 mEq by mouth daily as needed (takes with lasix).        Allergies   Allergen Reactions    Niacin Other (comments)     Severe flushing       Family History   Problem Relation Age of Onset   Margie Nixon Sudden Death Sister         cause unknown    Lung Disease Mother     Hypertension Father      Social History     Tobacco Use    Smoking status: Never Smoker    Smokeless tobacco: Never Used   Substance Use Topics    Alcohol use: Yes     Comment: socially     Patient Active Problem List   Diagnosis Code    Coronary artery disease involving native coronary artery of native heart without angina pectoris I25.10    S/P TAVR (transcatheter aortic valve replacement) Z95.2       Depression Risk Factor Screening:     3 most recent PHQ Screens 9/3/2019   Little interest or pleasure in doing things Not at all   Feeling down, depressed, irritable, or hopeless Not at all   Total Score PHQ 2 0     Alcohol Risk Factor Screening: On any occasion in the past three months he has not had more than 7 drinks containing alcohol    Functional Ability and Level of Safety:   Hearing Loss  The patient wears hearing aids. Activities of Daily Living  The home contains: no safety equipment. Patient does total self care    Fall Risk  Fall Risk Assessment, last 12 mths 9/3/2019   Able to walk? Yes   Fall in past 12 months? No       Abuse Screen  Patient is not abused    Cognitive Screening   Evaluation of Cognitive Function:  Has your family/caregiver stated any concerns about your memory: no  Normal    Patient Care Team   Patient Care Team:  Alley Ty MD as PCP - General (Internal Medicine)    Assessment/Plan   Education and counseling provided:  Are appropriate based on today's review and evaluation    Diagnoses and all orders for this visit:    1.  Medicare annual wellness visit, subsequent  -     CBC WITH AUTOMATED DIFF  -     METABOLIC PANEL, COMPREHENSIVE        Health Maintenance Due   Topic Date Due    Shingrix Vaccine Age 49> (2 of 2) 09/25/2018    MEDICARE YEARLY EXAM  07/13/2019    Influenza Age 9 to Adult  08/01/2019       Follow-up and Dispositions    · Return in about 6 months (around 3/3/2020), or if symptoms worsen or fail to improve.

## 2019-09-04 LAB
ALBUMIN SERPL-MCNC: 4.1 G/DL (ref 3.5–4.7)
ALBUMIN/GLOB SERPL: 1.6 {RATIO} (ref 1.2–2.2)
ALP SERPL-CCNC: 88 IU/L (ref 39–117)
ALT SERPL-CCNC: 22 IU/L (ref 0–44)
AST SERPL-CCNC: 35 IU/L (ref 0–40)
BASOPHILS # BLD AUTO: 0 X10E3/UL (ref 0–0.2)
BASOPHILS NFR BLD AUTO: 1 %
BILIRUB SERPL-MCNC: 0.8 MG/DL (ref 0–1.2)
BUN SERPL-MCNC: 23 MG/DL (ref 8–27)
BUN/CREAT SERPL: 21 (ref 10–24)
CALCIUM SERPL-MCNC: 9.2 MG/DL (ref 8.6–10.2)
CHLORIDE SERPL-SCNC: 105 MMOL/L (ref 96–106)
CO2 SERPL-SCNC: 21 MMOL/L (ref 20–29)
CREAT SERPL-MCNC: 1.11 MG/DL (ref 0.76–1.27)
EOSINOPHIL # BLD AUTO: 0.2 X10E3/UL (ref 0–0.4)
EOSINOPHIL NFR BLD AUTO: 2 %
ERYTHROCYTE [DISTWIDTH] IN BLOOD BY AUTOMATED COUNT: 14.3 % (ref 12.3–15.4)
GLOBULIN SER CALC-MCNC: 2.5 G/DL (ref 1.5–4.5)
GLUCOSE SERPL-MCNC: 91 MG/DL (ref 65–99)
HCT VFR BLD AUTO: 40.6 % (ref 37.5–51)
HGB BLD-MCNC: 12.7 G/DL (ref 13–17.7)
IMM GRANULOCYTES # BLD AUTO: 0 X10E3/UL (ref 0–0.1)
IMM GRANULOCYTES NFR BLD AUTO: 0 %
LYMPHOCYTES # BLD AUTO: 1.7 X10E3/UL (ref 0.7–3.1)
LYMPHOCYTES NFR BLD AUTO: 21 %
MCH RBC QN AUTO: 28.9 PG (ref 26.6–33)
MCHC RBC AUTO-ENTMCNC: 31.3 G/DL (ref 31.5–35.7)
MCV RBC AUTO: 92 FL (ref 79–97)
MONOCYTES # BLD AUTO: 0.6 X10E3/UL (ref 0.1–0.9)
MONOCYTES NFR BLD AUTO: 8 %
NEUTROPHILS # BLD AUTO: 5.5 X10E3/UL (ref 1.4–7)
NEUTROPHILS NFR BLD AUTO: 68 %
PLATELET # BLD AUTO: 206 X10E3/UL (ref 150–450)
POTASSIUM SERPL-SCNC: 4.8 MMOL/L (ref 3.5–5.2)
PROT SERPL-MCNC: 6.6 G/DL (ref 6–8.5)
RBC # BLD AUTO: 4.4 X10E6/UL (ref 4.14–5.8)
SODIUM SERPL-SCNC: 143 MMOL/L (ref 134–144)
WBC # BLD AUTO: 8.1 X10E3/UL (ref 3.4–10.8)

## 2019-12-17 ENCOUNTER — TELEPHONE (OUTPATIENT)
Dept: INTERNAL MEDICINE CLINIC | Age: 79
End: 2019-12-17

## 2019-12-17 NOTE — TELEPHONE ENCOUNTER
Patient states blood in semen, started about year ago. brownish when ejaculating. No pain, no urinary frequency. No back or abdominal pain. Patient has not seen urology.

## 2019-12-17 NOTE — TELEPHONE ENCOUNTER
Informed patient per provider, Dr Xiomara Alexis [facility] for further evaluation.  Understanding verbalized

## 2021-01-01 ENCOUNTER — APPOINTMENT (OUTPATIENT)
Dept: CT IMAGING | Age: 81
DRG: 308 | End: 2021-01-01
Attending: HEALTH CARE PROVIDER
Payer: MEDICARE

## 2021-01-01 ENCOUNTER — APPOINTMENT (OUTPATIENT)
Dept: NON INVASIVE DIAGNOSTICS | Age: 81
DRG: 308 | End: 2021-01-01
Attending: HEALTH CARE PROVIDER
Payer: MEDICARE

## 2021-01-01 ENCOUNTER — APPOINTMENT (OUTPATIENT)
Dept: CT IMAGING | Age: 81
DRG: 308 | End: 2021-01-01
Attending: STUDENT IN AN ORGANIZED HEALTH CARE EDUCATION/TRAINING PROGRAM
Payer: MEDICARE

## 2021-01-01 ENCOUNTER — APPOINTMENT (OUTPATIENT)
Dept: GENERAL RADIOLOGY | Age: 81
DRG: 308 | End: 2021-01-01
Attending: SURGERY
Payer: MEDICARE

## 2021-01-01 ENCOUNTER — APPOINTMENT (OUTPATIENT)
Dept: MRI IMAGING | Age: 81
DRG: 308 | End: 2021-01-01
Attending: SURGERY
Payer: MEDICARE

## 2021-01-01 ENCOUNTER — APPOINTMENT (OUTPATIENT)
Dept: CT IMAGING | Age: 81
DRG: 308 | End: 2021-01-01
Attending: SURGERY
Payer: MEDICARE

## 2021-01-01 ENCOUNTER — APPOINTMENT (OUTPATIENT)
Dept: GENERAL RADIOLOGY | Age: 81
DRG: 308 | End: 2021-01-01
Attending: INTERNAL MEDICINE
Payer: MEDICARE

## 2021-01-01 ENCOUNTER — APPOINTMENT (OUTPATIENT)
Dept: GENERAL RADIOLOGY | Age: 81
DRG: 308 | End: 2021-01-01
Attending: STUDENT IN AN ORGANIZED HEALTH CARE EDUCATION/TRAINING PROGRAM
Payer: MEDICARE

## 2021-01-01 ENCOUNTER — HOSPICE ADMISSION (OUTPATIENT)
Dept: HOSPICE | Facility: HOSPICE | Age: 81
End: 2021-01-01

## 2021-01-01 ENCOUNTER — HOSPITAL ENCOUNTER (INPATIENT)
Age: 81
LOS: 11 days | DRG: 308 | End: 2021-04-09
Attending: STUDENT IN AN ORGANIZED HEALTH CARE EDUCATION/TRAINING PROGRAM | Admitting: HEALTH CARE PROVIDER
Payer: MEDICARE

## 2021-01-01 VITALS
SYSTOLIC BLOOD PRESSURE: 103 MMHG | TEMPERATURE: 99.4 F | WEIGHT: 162.26 LBS | HEIGHT: 71 IN | BODY MASS INDEX: 22.72 KG/M2 | HEART RATE: 75 BPM | RESPIRATION RATE: 22 BRPM | DIASTOLIC BLOOD PRESSURE: 67 MMHG | OXYGEN SATURATION: 92 %

## 2021-01-01 DIAGNOSIS — I46.9 CARDIAC ARREST (HCC): Primary | ICD-10-CM

## 2021-01-01 DIAGNOSIS — G93.1 SEVERE ANOXIC-ISCHEMIC ENCEPHALOPATHY (HCC): ICD-10-CM

## 2021-01-01 DIAGNOSIS — I25.5 ISCHEMIC CARDIOMYOPATHY: ICD-10-CM

## 2021-01-01 DIAGNOSIS — R55 CONVULSIVE SYNCOPE: ICD-10-CM

## 2021-01-01 DIAGNOSIS — R41.89 UNRESPONSIVE: ICD-10-CM

## 2021-01-01 DIAGNOSIS — I67.82 SEVERE ANOXIC-ISCHEMIC ENCEPHALOPATHY (HCC): ICD-10-CM

## 2021-01-01 LAB
ALBUMIN SERPL-MCNC: 2.6 G/DL (ref 3.5–5)
ALBUMIN SERPL-MCNC: 2.8 G/DL (ref 3.5–5)
ALBUMIN/GLOB SERPL: 0.8 {RATIO} (ref 1.1–2.2)
ALBUMIN/GLOB SERPL: 0.9 {RATIO} (ref 1.1–2.2)
ALP SERPL-CCNC: 104 U/L (ref 45–117)
ALP SERPL-CCNC: 99 U/L (ref 45–117)
ALT SERPL-CCNC: 230 U/L (ref 12–78)
ALT SERPL-CCNC: 728 U/L (ref 12–78)
ANION GAP SERPL CALC-SCNC: 11 MMOL/L (ref 5–15)
ANION GAP SERPL CALC-SCNC: 11 MMOL/L (ref 5–15)
ANION GAP SERPL CALC-SCNC: 13 MMOL/L (ref 5–15)
ANION GAP SERPL CALC-SCNC: 4 MMOL/L (ref 5–15)
ANION GAP SERPL CALC-SCNC: 6 MMOL/L (ref 5–15)
ANION GAP SERPL CALC-SCNC: 8 MMOL/L (ref 5–15)
ANION GAP SERPL CALC-SCNC: 9 MMOL/L (ref 5–15)
APPEARANCE UR: CLEAR
APTT PPP: 24.9 SEC (ref 22.1–31)
ARTERIAL PATENCY WRIST A: NO
ARTERIAL PATENCY WRIST A: YES
AST SERPL-CCNC: 255 U/L (ref 15–37)
AST SERPL-CCNC: 77 U/L (ref 15–37)
ATRIAL RATE: 288 BPM
ATRIAL RATE: 38 BPM
ATRIAL RATE: 60 BPM
AV R PG: 23.95 MMHG
BACTERIA URNS QL MICRO: NEGATIVE /HPF
BASE DEFICIT BLDA-SCNC: 12.5 MMOL/L
BASE DEFICIT BLDA-SCNC: 13.6 MMOL/L
BASE DEFICIT BLDV-SCNC: 9.3 MMOL/L
BASOPHILS # BLD: 0 K/UL (ref 0–0.1)
BASOPHILS # BLD: 0.1 K/UL (ref 0–0.1)
BASOPHILS NFR BLD: 0 % (ref 0–1)
BASOPHILS NFR BLD: 1 % (ref 0–1)
BDY SITE: ABNORMAL
BILIRUB SERPL-MCNC: 0.8 MG/DL (ref 0.2–1)
BILIRUB SERPL-MCNC: 2.9 MG/DL (ref 0.2–1)
BILIRUB UR QL: NEGATIVE
BUN SERPL-MCNC: 26 MG/DL (ref 6–20)
BUN SERPL-MCNC: 32 MG/DL (ref 6–20)
BUN SERPL-MCNC: 34 MG/DL (ref 6–20)
BUN SERPL-MCNC: 37 MG/DL (ref 6–20)
BUN SERPL-MCNC: 39 MG/DL (ref 6–20)
BUN SERPL-MCNC: 40 MG/DL (ref 6–20)
BUN SERPL-MCNC: 41 MG/DL (ref 6–20)
BUN SERPL-MCNC: 42 MG/DL (ref 6–20)
BUN SERPL-MCNC: 45 MG/DL (ref 6–20)
BUN SERPL-MCNC: 46 MG/DL (ref 6–20)
BUN SERPL-MCNC: 47 MG/DL (ref 6–20)
BUN SERPL-MCNC: 51 MG/DL (ref 6–20)
BUN/CREAT SERPL: 12 (ref 12–20)
BUN/CREAT SERPL: 12 (ref 12–20)
BUN/CREAT SERPL: 13 (ref 12–20)
BUN/CREAT SERPL: 13 (ref 12–20)
BUN/CREAT SERPL: 14 (ref 12–20)
BUN/CREAT SERPL: 15 (ref 12–20)
BUN/CREAT SERPL: 17 (ref 12–20)
BUN/CREAT SERPL: 19 (ref 12–20)
BUN/CREAT SERPL: 25 (ref 12–20)
BUN/CREAT SERPL: 25 (ref 12–20)
BUN/CREAT SERPL: 26 (ref 12–20)
BUN/CREAT SERPL: 28 (ref 12–20)
CALCIUM SERPL-MCNC: 7 MG/DL (ref 8.5–10.1)
CALCIUM SERPL-MCNC: 7.1 MG/DL (ref 8.5–10.1)
CALCIUM SERPL-MCNC: 7.3 MG/DL (ref 8.5–10.1)
CALCIUM SERPL-MCNC: 7.5 MG/DL (ref 8.5–10.1)
CALCIUM SERPL-MCNC: 7.5 MG/DL (ref 8.5–10.1)
CALCIUM SERPL-MCNC: 7.7 MG/DL (ref 8.5–10.1)
CALCIUM SERPL-MCNC: 7.7 MG/DL (ref 8.5–10.1)
CALCIUM SERPL-MCNC: 7.9 MG/DL (ref 8.5–10.1)
CALCIUM SERPL-MCNC: 8.4 MG/DL (ref 8.5–10.1)
CALCIUM SERPL-MCNC: 8.5 MG/DL (ref 8.5–10.1)
CALCULATED R AXIS, ECG10: -21 DEGREES
CALCULATED R AXIS, ECG10: -65 DEGREES
CALCULATED R AXIS, ECG10: -84 DEGREES
CALCULATED T AXIS, ECG11: 121 DEGREES
CALCULATED T AXIS, ECG11: 95 DEGREES
CALCULATED T AXIS, ECG11: 96 DEGREES
CHLORIDE SERPL-SCNC: 112 MMOL/L (ref 97–108)
CHLORIDE SERPL-SCNC: 112 MMOL/L (ref 97–108)
CHLORIDE SERPL-SCNC: 113 MMOL/L (ref 97–108)
CHLORIDE SERPL-SCNC: 114 MMOL/L (ref 97–108)
CHLORIDE SERPL-SCNC: 115 MMOL/L (ref 97–108)
CHLORIDE SERPL-SCNC: 115 MMOL/L (ref 97–108)
CHLORIDE SERPL-SCNC: 116 MMOL/L (ref 97–108)
CHLORIDE SERPL-SCNC: 116 MMOL/L (ref 97–108)
CHLORIDE SERPL-SCNC: 117 MMOL/L (ref 97–108)
CO2 SERPL-SCNC: 13 MMOL/L (ref 21–32)
CO2 SERPL-SCNC: 15 MMOL/L (ref 21–32)
CO2 SERPL-SCNC: 16 MMOL/L (ref 21–32)
CO2 SERPL-SCNC: 17 MMOL/L (ref 21–32)
CO2 SERPL-SCNC: 18 MMOL/L (ref 21–32)
CO2 SERPL-SCNC: 22 MMOL/L (ref 21–32)
CO2 SERPL-SCNC: 25 MMOL/L (ref 21–32)
CO2 SERPL-SCNC: 26 MMOL/L (ref 21–32)
CO2 SERPL-SCNC: 26 MMOL/L (ref 21–32)
CO2 SERPL-SCNC: 27 MMOL/L (ref 21–32)
COLOR UR: ABNORMAL
COVID-19 RAPID TEST, COVR: NOT DETECTED
CREAT SERPL-MCNC: 1.52 MG/DL (ref 0.7–1.3)
CREAT SERPL-MCNC: 1.6 MG/DL (ref 0.7–1.3)
CREAT SERPL-MCNC: 1.76 MG/DL (ref 0.7–1.3)
CREAT SERPL-MCNC: 1.89 MG/DL (ref 0.7–1.3)
CREAT SERPL-MCNC: 2.04 MG/DL (ref 0.7–1.3)
CREAT SERPL-MCNC: 2.07 MG/DL (ref 0.7–1.3)
CREAT SERPL-MCNC: 2.46 MG/DL (ref 0.7–1.3)
CREAT SERPL-MCNC: 2.71 MG/DL (ref 0.7–1.3)
CREAT SERPL-MCNC: 2.88 MG/DL (ref 0.7–1.3)
CREAT SERPL-MCNC: 3 MG/DL (ref 0.7–1.3)
CREAT SERPL-MCNC: 3.04 MG/DL (ref 0.7–1.3)
CREAT SERPL-MCNC: 3.05 MG/DL (ref 0.7–1.3)
CREAT UR-MCNC: <13 MG/DL
DIAGNOSIS, 93000: NORMAL
DIFFERENTIAL METHOD BLD: ABNORMAL
ECHO AO ASC DIAM: 3.44 CM
ECHO AR MAX VEL PISA: 244.69 CM/S
ECHO AV AREA PEAK VELOCITY: 1.16 CM2
ECHO AV AREA VTI: 1.13 CM2
ECHO AV AREA/BSA PEAK VELOCITY: 0.6 CM2/M2
ECHO AV AREA/BSA VTI: 0.6 CM2/M2
ECHO AV MEAN GRADIENT: 4.45 MMHG
ECHO AV PEAK GRADIENT: 7.33 MMHG
ECHO AV PEAK VELOCITY: 135.41 CM/S
ECHO AV REGURGITANT PHT: 960.69 MS
ECHO AV VTI: 23.97 CM
ECHO EST RA PRESSURE: 10 MMHG
ECHO LA AREA 4C: 23.88 CM2
ECHO LA TO AORTIC ROOT RATIO: 2.14
ECHO LA VOL 4C: 79.36 ML (ref 18–58)
ECHO LA VOLUME INDEX A4C: 41.13 ML/M2 (ref 16–28)
ECHO LV E' LATERAL VELOCITY: 12.05 CM/S
ECHO LV E' SEPTAL VELOCITY: 2.73 CM/S
ECHO LV EDV A4C: 215.3 ML
ECHO LV EDV INDEX A4C: 111.6 ML/M2
ECHO LV EJECTION FRACTION A4C: 20 PERCENT
ECHO LV ESV A4C: 171.83 ML
ECHO LV ESV INDEX A4C: 89.1 ML/M2
ECHO LV INTERNAL DIMENSION DIASTOLIC MMODE: 6.54 CM
ECHO LV INTERNAL DIMENSION SYSTOLIC MMODE: 6.08 CM
ECHO LV IVSD MMODE: 1.12 CM
ECHO LV IVSS MMODE: 1.44 CM
ECHO LV POSTERIOR WALL DIASTOLIC MMODE: 0.86 CM
ECHO LV POSTERIOR WALL SYSTOLIC MMODE: 1.12 CM
ECHO LVOT CARDIAC OUTPUT: 1.85 LITER/MINUTE
ECHO LVOT DIAM: 1.83 CM
ECHO LVOT PEAK GRADIENT: 1.41 MMHG
ECHO LVOT PEAK VELOCITY: 59.37 CM/S
ECHO LVOT SV: 27 ML
ECHO LVOT VTI: 10.24 CM
ECHO MV A VELOCITY: 74.77 CM/S
ECHO MV E DECELERATION TIME (DT): 148.11 MS
ECHO MV E VELOCITY: 103.19 CM/S
ECHO MV E/A RATIO: 1.38
ECHO MV E/E' LATERAL: 8.56
ECHO MV E/E' RATIO (AVERAGED): 23.18
ECHO MV E/E' SEPTAL: 37.8
ECHO MV EROA PISA: 0.37 CM2
ECHO MV REGURGITANT RADIUS PISA: 0.91 CM
ECHO MV REGURGITANT VOLUME: 61.52 ML
ECHO MV REGURGITANT VTIA: 168.34 CM
ECHO PV PEAK INSTANTANEOUS GRADIENT SYSTOLIC: 1.63 MMHG
ECHO PV REGURGITANT MAX VELOCITY: 175.73 CM/S
ECHO PV REGURGITANT MAX VELOCITY: 63.79 CM/S
ECHO RIGHT VENTRICULAR SYSTOLIC PRESSURE (RVSP): 38.48 MMHG
ECHO RV INTERNAL DIMENSION: 4.44 CM
ECHO TV A WAVE: 35.44 CM/S
ECHO TV E WAVE: 44.27 CM/S
ECHO TV REGURGITANT MAX VELOCITY: 266.71 CM/S
ECHO TV REGURGITANT PEAK GRADIENT: 28.48 MMHG
EOSINOPHIL # BLD: 0 K/UL (ref 0–0.4)
EOSINOPHIL # BLD: 0.3 K/UL (ref 0–0.4)
EOSINOPHIL NFR BLD: 0 % (ref 0–7)
EOSINOPHIL NFR BLD: 3 % (ref 0–7)
EPITH CASTS URNS QL MICRO: ABNORMAL /LPF
ERYTHROCYTE [DISTWIDTH] IN BLOOD BY AUTOMATED COUNT: 14 % (ref 11.5–14.5)
ERYTHROCYTE [DISTWIDTH] IN BLOOD BY AUTOMATED COUNT: 14.1 % (ref 11.5–14.5)
ERYTHROCYTE [DISTWIDTH] IN BLOOD BY AUTOMATED COUNT: 14.4 % (ref 11.5–14.5)
ERYTHROCYTE [DISTWIDTH] IN BLOOD BY AUTOMATED COUNT: 14.6 % (ref 11.5–14.5)
ERYTHROCYTE [DISTWIDTH] IN BLOOD BY AUTOMATED COUNT: 14.6 % (ref 11.5–14.5)
ERYTHROCYTE [DISTWIDTH] IN BLOOD BY AUTOMATED COUNT: 14.8 % (ref 11.5–14.5)
FIO2 ON VENT: 40 %
FIO2 ON VENT: 40 %
GAS FLOW.O2 SETTING OXYMISER: 18 L/MIN
GAS FLOW.O2 SETTING OXYMISER: 20 L/MIN
GLOBULIN SER CALC-MCNC: 3 G/DL (ref 2–4)
GLOBULIN SER CALC-MCNC: 3.3 G/DL (ref 2–4)
GLUCOSE BLD STRIP.AUTO-MCNC: 226 MG/DL (ref 65–100)
GLUCOSE SERPL-MCNC: 110 MG/DL (ref 65–100)
GLUCOSE SERPL-MCNC: 113 MG/DL (ref 65–100)
GLUCOSE SERPL-MCNC: 115 MG/DL (ref 65–100)
GLUCOSE SERPL-MCNC: 120 MG/DL (ref 65–100)
GLUCOSE SERPL-MCNC: 121 MG/DL (ref 65–100)
GLUCOSE SERPL-MCNC: 121 MG/DL (ref 65–100)
GLUCOSE SERPL-MCNC: 128 MG/DL (ref 65–100)
GLUCOSE SERPL-MCNC: 128 MG/DL (ref 65–100)
GLUCOSE SERPL-MCNC: 147 MG/DL (ref 65–100)
GLUCOSE SERPL-MCNC: 177 MG/DL (ref 65–100)
GLUCOSE SERPL-MCNC: 221 MG/DL (ref 65–100)
GLUCOSE SERPL-MCNC: 228 MG/DL (ref 65–100)
GLUCOSE UR STRIP.AUTO-MCNC: NEGATIVE MG/DL
HCO3 BLDA-SCNC: 13 MMOL/L (ref 22–26)
HCO3 BLDA-SCNC: 14 MMOL/L (ref 22–26)
HCO3 BLDV-SCNC: 17 MMOL/L (ref 23–28)
HCT VFR BLD AUTO: 32.4 % (ref 36.6–50.3)
HCT VFR BLD AUTO: 33.7 % (ref 36.6–50.3)
HCT VFR BLD AUTO: 33.8 % (ref 36.6–50.3)
HCT VFR BLD AUTO: 34.5 % (ref 36.6–50.3)
HCT VFR BLD AUTO: 35.2 % (ref 36.6–50.3)
HCT VFR BLD AUTO: 35.3 % (ref 36.6–50.3)
HCT VFR BLD AUTO: 38.1 % (ref 36.6–50.3)
HCT VFR BLD AUTO: 40.5 % (ref 36.6–50.3)
HGB BLD-MCNC: 10.3 G/DL (ref 12.1–17)
HGB BLD-MCNC: 10.8 G/DL (ref 12.1–17)
HGB BLD-MCNC: 10.9 G/DL (ref 12.1–17)
HGB BLD-MCNC: 11 G/DL (ref 12.1–17)
HGB BLD-MCNC: 11.2 G/DL (ref 12.1–17)
HGB BLD-MCNC: 11.5 G/DL (ref 12.1–17)
HGB BLD-MCNC: 11.8 G/DL (ref 12.1–17)
HGB BLD-MCNC: 12.7 G/DL (ref 12.1–17)
HGB UR QL STRIP: ABNORMAL
IMM GRANULOCYTES # BLD AUTO: 0 K/UL (ref 0–0.04)
IMM GRANULOCYTES # BLD AUTO: 0.1 K/UL (ref 0–0.04)
IMM GRANULOCYTES NFR BLD AUTO: 0 % (ref 0–0.5)
IMM GRANULOCYTES NFR BLD AUTO: 1 % (ref 0–0.5)
INR PPP: 1.2 (ref 0.9–1.1)
KETONES UR QL STRIP.AUTO: NEGATIVE MG/DL
LACTATE BLD-SCNC: 5.29 MMOL/L (ref 0.4–2)
LACTATE SERPL-SCNC: 1.4 MMOL/L (ref 0.4–2)
LACTATE SERPL-SCNC: 1.8 MMOL/L (ref 0.4–2)
LACTATE SERPL-SCNC: 2.1 MMOL/L (ref 0.4–2)
LACTATE SERPL-SCNC: 2.5 MMOL/L (ref 0.4–2)
LACTATE SERPL-SCNC: 2.8 MMOL/L (ref 0.4–2)
LACTATE SERPL-SCNC: 3.5 MMOL/L (ref 0.4–2)
LACTATE SERPL-SCNC: 3.5 MMOL/L (ref 0.4–2)
LACTATE SERPL-SCNC: 4 MMOL/L (ref 0.4–2)
LEUKOCYTE ESTERASE UR QL STRIP.AUTO: NEGATIVE
LIPASE SERPL-CCNC: 196 U/L (ref 73–393)
LVOT MG: 0.58 MMHG
LYMPHOCYTES # BLD: 0.5 K/UL (ref 0.8–3.5)
LYMPHOCYTES # BLD: 0.6 K/UL (ref 0.8–3.5)
LYMPHOCYTES # BLD: 1.2 K/UL (ref 0.8–3.5)
LYMPHOCYTES # BLD: 1.4 K/UL (ref 0.8–3.5)
LYMPHOCYTES # BLD: 1.4 K/UL (ref 0.8–3.5)
LYMPHOCYTES NFR BLD: 17 % (ref 12–49)
LYMPHOCYTES NFR BLD: 3 % (ref 12–49)
LYMPHOCYTES NFR BLD: 4 % (ref 12–49)
LYMPHOCYTES NFR BLD: 4 % (ref 12–49)
LYMPHOCYTES NFR BLD: 6 % (ref 12–49)
LYMPHOCYTES NFR BLD: 8 % (ref 12–49)
LYMPHOCYTES NFR BLD: 9 % (ref 12–49)
MAGNESIUM SERPL-MCNC: 1.8 MG/DL (ref 1.6–2.4)
MAGNESIUM SERPL-MCNC: 1.8 MG/DL (ref 1.6–2.4)
MAGNESIUM SERPL-MCNC: 1.9 MG/DL (ref 1.6–2.4)
MAGNESIUM SERPL-MCNC: 2 MG/DL (ref 1.6–2.4)
MAGNESIUM SERPL-MCNC: 2 MG/DL (ref 1.6–2.4)
MAGNESIUM SERPL-MCNC: 2.1 MG/DL (ref 1.6–2.4)
MAGNESIUM SERPL-MCNC: 2.2 MG/DL (ref 1.6–2.4)
MCH RBC QN AUTO: 30.5 PG (ref 26–34)
MCH RBC QN AUTO: 30.9 PG (ref 26–34)
MCH RBC QN AUTO: 31 PG (ref 26–34)
MCH RBC QN AUTO: 31.2 PG (ref 26–34)
MCHC RBC AUTO-ENTMCNC: 31 G/DL (ref 30–36.5)
MCHC RBC AUTO-ENTMCNC: 31.2 G/DL (ref 30–36.5)
MCHC RBC AUTO-ENTMCNC: 31.4 G/DL (ref 30–36.5)
MCHC RBC AUTO-ENTMCNC: 31.8 G/DL (ref 30–36.5)
MCHC RBC AUTO-ENTMCNC: 32 G/DL (ref 30–36.5)
MCHC RBC AUTO-ENTMCNC: 32.2 G/DL (ref 30–36.5)
MCHC RBC AUTO-ENTMCNC: 32.5 G/DL (ref 30–36.5)
MCHC RBC AUTO-ENTMCNC: 32.7 G/DL (ref 30–36.5)
MCV RBC AUTO: 94.6 FL (ref 80–99)
MCV RBC AUTO: 94.7 FL (ref 80–99)
MCV RBC AUTO: 95.3 FL (ref 80–99)
MCV RBC AUTO: 96.6 FL (ref 80–99)
MCV RBC AUTO: 97.3 FL (ref 80–99)
MCV RBC AUTO: 99.4 FL (ref 80–99)
MCV RBC AUTO: 99.5 FL (ref 80–99)
MCV RBC AUTO: 99.7 FL (ref 80–99)
MONOCYTES # BLD: 0.6 K/UL (ref 0–1)
MONOCYTES # BLD: 0.8 K/UL (ref 0–1)
MONOCYTES # BLD: 0.8 K/UL (ref 0–1)
MONOCYTES # BLD: 1.1 K/UL (ref 0–1)
MONOCYTES # BLD: 1.1 K/UL (ref 0–1)
MONOCYTES # BLD: 1.3 K/UL (ref 0–1)
MONOCYTES # BLD: 1.4 K/UL (ref 0–1)
MONOCYTES NFR BLD: 10 % (ref 5–13)
MONOCYTES NFR BLD: 5 % (ref 5–13)
MONOCYTES NFR BLD: 5 % (ref 5–13)
MONOCYTES NFR BLD: 6 % (ref 5–13)
MONOCYTES NFR BLD: 9 % (ref 5–13)
MR PISA PV: 488.96 CM/S
MUCOUS THREADS URNS QL MICRO: ABNORMAL /LPF
NEUTS BAND NFR BLD MANUAL: 2 %
NEUTS BAND NFR BLD MANUAL: 2 %
NEUTS BAND NFR BLD MANUAL: 3 %
NEUTS BAND NFR BLD MANUAL: 4 %
NEUTS SEG # BLD: 10.7 K/UL (ref 1.8–8)
NEUTS SEG # BLD: 12.9 K/UL (ref 1.8–8)
NEUTS SEG # BLD: 17.4 K/UL (ref 1.8–8)
NEUTS SEG # BLD: 5.8 K/UL (ref 1.8–8)
NEUTS SEG # BLD: 8.9 K/UL (ref 1.8–8)
NEUTS SEG NFR BLD: 68 % (ref 32–75)
NEUTS SEG NFR BLD: 82 % (ref 32–75)
NEUTS SEG NFR BLD: 82 % (ref 32–75)
NEUTS SEG NFR BLD: 83 % (ref 32–75)
NEUTS SEG NFR BLD: 86 % (ref 32–75)
NEUTS SEG NFR BLD: 88 % (ref 32–75)
NEUTS SEG NFR BLD: 89 % (ref 32–75)
NITRITE UR QL STRIP.AUTO: NEGATIVE
NRBC # BLD: 0 K/UL (ref 0–0.01)
NRBC # BLD: 0.02 K/UL (ref 0–0.01)
NRBC # BLD: 0.03 K/UL (ref 0–0.01)
NRBC BLD-RTO: 0 PER 100 WBC
NRBC BLD-RTO: 0.1 PER 100 WBC
NRBC BLD-RTO: 0.2 PER 100 WBC
OSMOLALITY UR: 347 MOSM/KG H2O
P-R INTERVAL, ECG05: 248 MS
PCO2 BLDA: 28 MMHG (ref 35–45)
PCO2 BLDA: 36 MMHG (ref 35–45)
PCO2 BLDV: 36.1 MMHG (ref 41–51)
PEEP RESPIRATORY: 5 CM[H2O]
PEEP RESPIRATORY: 5 CM[H2O]
PH BLDA: 7.19 [PH] (ref 7.35–7.45)
PH BLDA: 7.27 [PH] (ref 7.35–7.45)
PH BLDV: 7.28 [PH] (ref 7.32–7.42)
PH UR STRIP: 5 [PH] (ref 5–8)
PHOSPHATE SERPL-MCNC: 2.8 MG/DL (ref 2.6–4.7)
PHOSPHATE SERPL-MCNC: 2.8 MG/DL (ref 2.6–4.7)
PHOSPHATE SERPL-MCNC: 3 MG/DL (ref 2.6–4.7)
PHOSPHATE SERPL-MCNC: 3.6 MG/DL (ref 2.6–4.7)
PHOSPHATE SERPL-MCNC: 3.8 MG/DL (ref 2.6–4.7)
PHOSPHATE SERPL-MCNC: 4.3 MG/DL (ref 2.6–4.7)
PHOSPHATE SERPL-MCNC: 4.9 MG/DL (ref 2.6–4.7)
PHOSPHATE SERPL-MCNC: 5.1 MG/DL (ref 2.6–4.7)
PHOSPHATE SERPL-MCNC: 7.3 MG/DL (ref 2.6–4.7)
PLATELET # BLD AUTO: 101 K/UL (ref 150–400)
PLATELET # BLD AUTO: 113 K/UL (ref 150–400)
PLATELET # BLD AUTO: 113 K/UL (ref 150–400)
PLATELET # BLD AUTO: 147 K/UL (ref 150–400)
PLATELET # BLD AUTO: 177 K/UL (ref 150–400)
PLATELET # BLD AUTO: 198 K/UL (ref 150–400)
PLATELET # BLD AUTO: 81 K/UL (ref 150–400)
PLATELET # BLD AUTO: 99 K/UL (ref 150–400)
PMV BLD AUTO: 10.9 FL (ref 8.9–12.9)
PMV BLD AUTO: 11 FL (ref 8.9–12.9)
PMV BLD AUTO: 11.1 FL (ref 8.9–12.9)
PMV BLD AUTO: 11.3 FL (ref 8.9–12.9)
PMV BLD AUTO: 11.5 FL (ref 8.9–12.9)
PO2 BLDA: 35 MMHG (ref 80–100)
PO2 BLDA: 74 MMHG (ref 80–100)
PO2 BLDV: 35 MMHG (ref 25–40)
POTASSIUM SERPL-SCNC: 3 MMOL/L (ref 3.5–5.1)
POTASSIUM SERPL-SCNC: 3.3 MMOL/L (ref 3.5–5.1)
POTASSIUM SERPL-SCNC: 3.6 MMOL/L (ref 3.5–5.1)
POTASSIUM SERPL-SCNC: 3.6 MMOL/L (ref 3.5–5.1)
POTASSIUM SERPL-SCNC: 4 MMOL/L (ref 3.5–5.1)
POTASSIUM SERPL-SCNC: 4.1 MMOL/L (ref 3.5–5.1)
POTASSIUM SERPL-SCNC: 4.1 MMOL/L (ref 3.5–5.1)
POTASSIUM SERPL-SCNC: 5.1 MMOL/L (ref 3.5–5.1)
POTASSIUM SERPL-SCNC: 5.2 MMOL/L (ref 3.5–5.1)
POTASSIUM SERPL-SCNC: 5.6 MMOL/L (ref 3.5–5.1)
POTASSIUM SERPL-SCNC: 5.7 MMOL/L (ref 3.5–5.1)
POTASSIUM SERPL-SCNC: 6.3 MMOL/L (ref 3.5–5.1)
PROT SERPL-MCNC: 5.6 G/DL (ref 6.4–8.2)
PROT SERPL-MCNC: 6.1 G/DL (ref 6.4–8.2)
PROT UR STRIP-MCNC: NEGATIVE MG/DL
PROTHROMBIN TIME: 12.6 SEC (ref 9–11.1)
Q-T INTERVAL, ECG07: 522 MS
Q-T INTERVAL, ECG07: 560 MS
Q-T INTERVAL, ECG07: 570 MS
QRS DURATION, ECG06: 158 MS
QRS DURATION, ECG06: 160 MS
QRS DURATION, ECG06: 164 MS
QTC CALCULATION (BEZET), ECG08: 421 MS
QTC CALCULATION (BEZET), ECG08: 445 MS
QTC CALCULATION (BEZET), ECG08: 516 MS
RBC # BLD AUTO: 3.33 M/UL (ref 4.1–5.7)
RBC # BLD AUTO: 3.49 M/UL (ref 4.1–5.7)
RBC # BLD AUTO: 3.55 M/UL (ref 4.1–5.7)
RBC # BLD AUTO: 3.57 M/UL (ref 4.1–5.7)
RBC # BLD AUTO: 3.62 M/UL (ref 4.1–5.7)
RBC # BLD AUTO: 3.72 M/UL (ref 4.1–5.7)
RBC # BLD AUTO: 3.82 M/UL (ref 4.1–5.7)
RBC # BLD AUTO: 4.07 M/UL (ref 4.1–5.7)
RBC #/AREA URNS HPF: ABNORMAL /HPF (ref 0–5)
RBC MORPH BLD: ABNORMAL
SAO2 % BLD: 54 % (ref 92–97)
SAO2 % BLD: 93 % (ref 92–97)
SAO2 % BLDV: 60 % (ref 65–88)
SAO2% DEVICE SAO2% SENSOR NAME: ABNORMAL
SERVICE CMNT-IMP: ABNORMAL
SERVICE CMNT-IMP: ABNORMAL
SODIUM SERPL-SCNC: 138 MMOL/L (ref 136–145)
SODIUM SERPL-SCNC: 140 MMOL/L (ref 136–145)
SODIUM SERPL-SCNC: 142 MMOL/L (ref 136–145)
SODIUM SERPL-SCNC: 143 MMOL/L (ref 136–145)
SODIUM SERPL-SCNC: 145 MMOL/L (ref 136–145)
SODIUM SERPL-SCNC: 146 MMOL/L (ref 136–145)
SODIUM SERPL-SCNC: 147 MMOL/L (ref 136–145)
SODIUM SERPL-SCNC: 147 MMOL/L (ref 136–145)
SODIUM SERPL-SCNC: 148 MMOL/L (ref 136–145)
SODIUM UR-SCNC: 132 MMOL/L
SOURCE, COVRS: NORMAL
SP GR UR REFRACTOMETRY: 1.02 (ref 1–1.03)
SPECIMEN SITE: ABNORMAL
THERAPEUTIC RANGE,PTTT: NORMAL SECS (ref 58–77)
TROPONIN I BLD-MCNC: 0.4 NG/ML (ref 0–0.08)
TROPONIN I SERPL-MCNC: 1.46 NG/ML
TROPONIN I SERPL-MCNC: 2.34 NG/ML
TROPONIN I SERPL-MCNC: 29 NG/ML
TROPONIN I SERPL-MCNC: 54.2 NG/ML
UROBILINOGEN UR QL STRIP.AUTO: 0.2 EU/DL (ref 0.2–1)
VENTILATION MODE VENT: ABNORMAL
VENTILATION MODE VENT: ABNORMAL
VENTRICULAR RATE, ECG03: 33 BPM
VENTRICULAR RATE, ECG03: 38 BPM
VENTRICULAR RATE, ECG03: 59 BPM
VT SETTING VENT: 400 ML
VT SETTING VENT: 400 ML
WBC # BLD AUTO: 10.7 K/UL (ref 4.1–11.1)
WBC # BLD AUTO: 11.8 K/UL (ref 4.1–11.1)
WBC # BLD AUTO: 14.9 K/UL (ref 4.1–11.1)
WBC # BLD AUTO: 15.1 K/UL (ref 4.1–11.1)
WBC # BLD AUTO: 15.6 K/UL (ref 4.1–11.1)
WBC # BLD AUTO: 15.8 K/UL (ref 4.1–11.1)
WBC # BLD AUTO: 19.1 K/UL (ref 4.1–11.1)
WBC # BLD AUTO: 8.4 K/UL (ref 4.1–11.1)
WBC URNS QL MICRO: ABNORMAL /HPF (ref 0–4)

## 2021-01-01 PROCEDURE — 94003 VENT MGMT INPAT SUBQ DAY: CPT

## 2021-01-01 PROCEDURE — 74011250637 HC RX REV CODE- 250/637: Performed by: HEALTH CARE PROVIDER

## 2021-01-01 PROCEDURE — 96374 THER/PROPH/DIAG INJ IV PUSH: CPT

## 2021-01-01 PROCEDURE — 74011250636 HC RX REV CODE- 250/636: Performed by: NURSE PRACTITIONER

## 2021-01-01 PROCEDURE — 84484 ASSAY OF TROPONIN QUANT: CPT

## 2021-01-01 PROCEDURE — 2709999900 HC NON-CHARGEABLE SUPPLY

## 2021-01-01 PROCEDURE — 99223 1ST HOSP IP/OBS HIGH 75: CPT | Performed by: NURSE PRACTITIONER

## 2021-01-01 PROCEDURE — 80048 BASIC METABOLIC PNL TOTAL CA: CPT

## 2021-01-01 PROCEDURE — 74011250636 HC RX REV CODE- 250/636: Performed by: STUDENT IN AN ORGANIZED HEALTH CARE EDUCATION/TRAINING PROGRAM

## 2021-01-01 PROCEDURE — 99233 SBSQ HOSP IP/OBS HIGH 50: CPT | Performed by: PSYCHIATRY & NEUROLOGY

## 2021-01-01 PROCEDURE — 93005 ELECTROCARDIOGRAM TRACING: CPT

## 2021-01-01 PROCEDURE — 65620000000 HC RM CCU GENERAL

## 2021-01-01 PROCEDURE — 84100 ASSAY OF PHOSPHORUS: CPT

## 2021-01-01 PROCEDURE — 82803 BLOOD GASES ANY COMBINATION: CPT

## 2021-01-01 PROCEDURE — 85025 COMPLETE CBC W/AUTO DIFF WBC: CPT

## 2021-01-01 PROCEDURE — 74011000258 HC RX REV CODE- 258: Performed by: STUDENT IN AN ORGANIZED HEALTH CARE EDUCATION/TRAINING PROGRAM

## 2021-01-01 PROCEDURE — 74011250636 HC RX REV CODE- 250/636: Performed by: INTERNAL MEDICINE

## 2021-01-01 PROCEDURE — 74011250636 HC RX REV CODE- 250/636: Performed by: SURGERY

## 2021-01-01 PROCEDURE — 74011250636 HC RX REV CODE- 250/636: Performed by: HEALTH CARE PROVIDER

## 2021-01-01 PROCEDURE — 83935 ASSAY OF URINE OSMOLALITY: CPT

## 2021-01-01 PROCEDURE — 74011000250 HC RX REV CODE- 250: Performed by: STUDENT IN AN ORGANIZED HEALTH CARE EDUCATION/TRAINING PROGRAM

## 2021-01-01 PROCEDURE — 74011250637 HC RX REV CODE- 250/637: Performed by: NURSE PRACTITIONER

## 2021-01-01 PROCEDURE — 74011000250 HC RX REV CODE- 250: Performed by: INTERNAL MEDICINE

## 2021-01-01 PROCEDURE — 36415 COLL VENOUS BLD VENIPUNCTURE: CPT

## 2021-01-01 PROCEDURE — 82570 ASSAY OF URINE CREATININE: CPT

## 2021-01-01 PROCEDURE — 83605 ASSAY OF LACTIC ACID: CPT

## 2021-01-01 PROCEDURE — 70450 CT HEAD/BRAIN W/O DYE: CPT

## 2021-01-01 PROCEDURE — 74011250637 HC RX REV CODE- 250/637: Performed by: SURGERY

## 2021-01-01 PROCEDURE — 74011000250 HC RX REV CODE- 250: Performed by: HEALTH CARE PROVIDER

## 2021-01-01 PROCEDURE — 80053 COMPREHEN METABOLIC PANEL: CPT

## 2021-01-01 PROCEDURE — 74011000250 HC RX REV CODE- 250: Performed by: NURSE PRACTITIONER

## 2021-01-01 PROCEDURE — 74011000258 HC RX REV CODE- 258: Performed by: SURGERY

## 2021-01-01 PROCEDURE — 71045 X-RAY EXAM CHEST 1 VIEW: CPT

## 2021-01-01 PROCEDURE — 83735 ASSAY OF MAGNESIUM: CPT

## 2021-01-01 PROCEDURE — 65270000015 HC RM PRIVATE ONCOLOGY

## 2021-01-01 PROCEDURE — 74011250636 HC RX REV CODE- 250/636

## 2021-01-01 PROCEDURE — 31500 INSERT EMERGENCY AIRWAY: CPT

## 2021-01-01 PROCEDURE — 81001 URINALYSIS AUTO W/SCOPE: CPT

## 2021-01-01 PROCEDURE — 84300 ASSAY OF URINE SODIUM: CPT

## 2021-01-01 PROCEDURE — 85610 PROTHROMBIN TIME: CPT

## 2021-01-01 PROCEDURE — 95819 EEG AWAKE AND ASLEEP: CPT | Performed by: PSYCHIATRY & NEUROLOGY

## 2021-01-01 PROCEDURE — 72125 CT NECK SPINE W/O DYE: CPT

## 2021-01-01 PROCEDURE — 74011000258 HC RX REV CODE- 258: Performed by: INTERNAL MEDICINE

## 2021-01-01 PROCEDURE — 74011000258 HC RX REV CODE- 258: Performed by: HEALTH CARE PROVIDER

## 2021-01-01 PROCEDURE — 99223 1ST HOSP IP/OBS HIGH 75: CPT | Performed by: PSYCHIATRY & NEUROLOGY

## 2021-01-01 PROCEDURE — 36600 WITHDRAWAL OF ARTERIAL BLOOD: CPT

## 2021-01-01 PROCEDURE — 85027 COMPLETE CBC AUTOMATED: CPT

## 2021-01-01 PROCEDURE — 70553 MRI BRAIN STEM W/O & W/DYE: CPT

## 2021-01-01 PROCEDURE — 74011000250 HC RX REV CODE- 250

## 2021-01-01 PROCEDURE — 99291 CRITICAL CARE FIRST HOUR: CPT | Performed by: PSYCHIATRY & NEUROLOGY

## 2021-01-01 PROCEDURE — 99233 SBSQ HOSP IP/OBS HIGH 50: CPT | Performed by: NURSE PRACTITIONER

## 2021-01-01 PROCEDURE — 87635 SARS-COV-2 COVID-19 AMP PRB: CPT

## 2021-01-01 PROCEDURE — 75810000455 HC PLCMT CENT VENOUS CATH LVL 2 5182

## 2021-01-01 PROCEDURE — 5A1955Z RESPIRATORY VENTILATION, GREATER THAN 96 CONSECUTIVE HOURS: ICD-10-PCS | Performed by: INTERNAL MEDICINE

## 2021-01-01 PROCEDURE — 95816 EEG AWAKE AND DROWSY: CPT | Performed by: PSYCHIATRY & NEUROLOGY

## 2021-01-01 PROCEDURE — 99285 EMERGENCY DEPT VISIT HI MDM: CPT

## 2021-01-01 PROCEDURE — A9575 INJ GADOTERATE MEGLUMI 0.1ML: HCPCS | Performed by: SURGERY

## 2021-01-01 PROCEDURE — 0BH17EZ INSERTION OF ENDOTRACHEAL AIRWAY INTO TRACHEA, VIA NATURAL OR ARTIFICIAL OPENING: ICD-10-PCS | Performed by: INTERNAL MEDICINE

## 2021-01-01 PROCEDURE — 71260 CT THORAX DX C+: CPT

## 2021-01-01 PROCEDURE — 82962 GLUCOSE BLOOD TEST: CPT

## 2021-01-01 PROCEDURE — 77010033678 HC OXYGEN DAILY

## 2021-01-01 PROCEDURE — 93306 TTE W/DOPPLER COMPLETE: CPT

## 2021-01-01 PROCEDURE — 74011000636 HC RX REV CODE- 636: Performed by: STUDENT IN AN ORGANIZED HEALTH CARE EDUCATION/TRAINING PROGRAM

## 2021-01-01 PROCEDURE — P9045 ALBUMIN (HUMAN), 5%, 250 ML: HCPCS | Performed by: SURGERY

## 2021-01-01 PROCEDURE — 85730 THROMBOPLASTIN TIME PARTIAL: CPT

## 2021-01-01 PROCEDURE — 74177 CT ABD & PELVIS W/CONTRAST: CPT

## 2021-01-01 PROCEDURE — 94002 VENT MGMT INPAT INIT DAY: CPT

## 2021-01-01 PROCEDURE — 83690 ASSAY OF LIPASE: CPT

## 2021-01-01 RX ORDER — SODIUM CHLORIDE 0.9 % (FLUSH) 0.9 %
5-40 SYRINGE (ML) INJECTION AS NEEDED
Status: DISCONTINUED | OUTPATIENT
Start: 2021-01-01 | End: 2021-01-01 | Stop reason: HOSPADM

## 2021-01-01 RX ORDER — ONDANSETRON 2 MG/ML
4 INJECTION INTRAMUSCULAR; INTRAVENOUS
Status: DISCONTINUED | OUTPATIENT
Start: 2021-01-01 | End: 2021-01-01

## 2021-01-01 RX ORDER — SODIUM BICARBONATE 1 MEQ/ML
100 SYRINGE (ML) INTRAVENOUS ONCE
Status: COMPLETED | OUTPATIENT
Start: 2021-01-01 | End: 2021-01-01

## 2021-01-01 RX ORDER — CALCIUM GLUCONATE 94 MG/ML
1 INJECTION, SOLUTION INTRAVENOUS ONCE
Status: COMPLETED | OUTPATIENT
Start: 2021-01-01 | End: 2021-01-01

## 2021-01-01 RX ORDER — POTASSIUM CHLORIDE 7.45 MG/ML
10 INJECTION INTRAVENOUS AS NEEDED
Status: DISCONTINUED | OUTPATIENT
Start: 2021-01-01 | End: 2021-01-01

## 2021-01-01 RX ORDER — GLYCOPYRROLATE 0.2 MG/ML
0.2 INJECTION INTRAMUSCULAR; INTRAVENOUS
Status: DISCONTINUED | OUTPATIENT
Start: 2021-01-01 | End: 2021-01-01 | Stop reason: HOSPADM

## 2021-01-01 RX ORDER — POTASSIUM CHLORIDE 7.45 MG/ML
10 INJECTION INTRAVENOUS
Status: DISCONTINUED | OUTPATIENT
Start: 2021-01-01 | End: 2021-01-01

## 2021-01-01 RX ORDER — SODIUM CHLORIDE 450 MG/100ML
50 INJECTION, SOLUTION INTRAVENOUS CONTINUOUS
Status: DISCONTINUED | OUTPATIENT
Start: 2021-01-01 | End: 2021-01-01

## 2021-01-01 RX ORDER — FENTANYL CITRATE 50 UG/ML
100 INJECTION, SOLUTION INTRAMUSCULAR; INTRAVENOUS
Status: DISCONTINUED | OUTPATIENT
Start: 2021-01-01 | End: 2021-01-01

## 2021-01-01 RX ORDER — ATROPINE SULFATE 1 MG/ML
1 INJECTION, SOLUTION INTRAVENOUS ONCE
Status: DISCONTINUED | OUTPATIENT
Start: 2021-01-01 | End: 2021-01-01 | Stop reason: SDUPTHER

## 2021-01-01 RX ORDER — MIDAZOLAM HYDROCHLORIDE 1 MG/ML
INJECTION, SOLUTION INTRAMUSCULAR; INTRAVENOUS
Status: DISPENSED
Start: 2021-01-01 | End: 2021-01-01

## 2021-01-01 RX ORDER — SCOLOPAMINE TRANSDERMAL SYSTEM 1 MG/1
1 PATCH, EXTENDED RELEASE TRANSDERMAL
Status: DISCONTINUED | OUTPATIENT
Start: 2021-01-01 | End: 2021-01-01 | Stop reason: HOSPADM

## 2021-01-01 RX ORDER — POTASSIUM CHLORIDE 7.45 MG/ML
10 INJECTION INTRAVENOUS
Status: DISPENSED | OUTPATIENT
Start: 2021-01-01 | End: 2021-01-01

## 2021-01-01 RX ORDER — SODIUM CHLORIDE 9 MG/ML
125 INJECTION, SOLUTION INTRAVENOUS CONTINUOUS
Status: DISCONTINUED | OUTPATIENT
Start: 2021-01-01 | End: 2021-01-01

## 2021-01-01 RX ORDER — BALSAM PERU/CASTOR OIL
OINTMENT (GRAM) TOPICAL 2 TIMES DAILY
Status: DISCONTINUED | OUTPATIENT
Start: 2021-01-01 | End: 2021-01-01 | Stop reason: HOSPADM

## 2021-01-01 RX ORDER — SODIUM BICARBONATE 1 MEQ/ML
50 SYRINGE (ML) INTRAVENOUS ONCE
Status: COMPLETED | OUTPATIENT
Start: 2021-01-01 | End: 2021-01-01

## 2021-01-01 RX ORDER — ACETAMINOPHEN 650 MG/1
650 SUPPOSITORY RECTAL
Status: DISCONTINUED | OUTPATIENT
Start: 2021-01-01 | End: 2021-01-01

## 2021-01-01 RX ORDER — SODIUM CHLORIDE 9 MG/ML
10 INJECTION, SOLUTION INTRAVENOUS CONTINUOUS
Status: DISCONTINUED | OUTPATIENT
Start: 2021-01-01 | End: 2021-01-01

## 2021-01-01 RX ORDER — LORAZEPAM 2 MG/ML
2-4 INJECTION INTRAMUSCULAR
Status: DISCONTINUED | OUTPATIENT
Start: 2021-01-01 | End: 2021-01-01 | Stop reason: HOSPADM

## 2021-01-01 RX ORDER — POLYETHYLENE GLYCOL 3350 17 G/17G
17 POWDER, FOR SOLUTION ORAL DAILY PRN
Status: DISCONTINUED | OUTPATIENT
Start: 2021-01-01 | End: 2021-01-01

## 2021-01-01 RX ORDER — HYDROMORPHONE HYDROCHLORIDE 1 MG/ML
1 INJECTION, SOLUTION INTRAMUSCULAR; INTRAVENOUS; SUBCUTANEOUS
Status: DISCONTINUED | OUTPATIENT
Start: 2021-01-01 | End: 2021-01-01 | Stop reason: HOSPADM

## 2021-01-01 RX ORDER — ATROPINE SULFATE 0.1 MG/ML
1 INJECTION INTRAVENOUS
Status: COMPLETED | OUTPATIENT
Start: 2021-01-01 | End: 2021-01-01

## 2021-01-01 RX ORDER — FUROSEMIDE 10 MG/ML
100 INJECTION INTRAMUSCULAR; INTRAVENOUS ONCE
Status: COMPLETED | OUTPATIENT
Start: 2021-01-01 | End: 2021-01-01

## 2021-01-01 RX ORDER — SODIUM CHLORIDE 0.9 % (FLUSH) 0.9 %
5-40 SYRINGE (ML) INJECTION EVERY 8 HOURS
Status: DISCONTINUED | OUTPATIENT
Start: 2021-01-01 | End: 2021-01-01

## 2021-01-01 RX ORDER — POTASSIUM CHLORIDE 7.45 MG/ML
10 INJECTION INTRAVENOUS
Status: COMPLETED | OUTPATIENT
Start: 2021-01-01 | End: 2021-01-01

## 2021-01-01 RX ORDER — MAGNESIUM SULFATE HEPTAHYDRATE 40 MG/ML
2 INJECTION, SOLUTION INTRAVENOUS ONCE
Status: COMPLETED | OUTPATIENT
Start: 2021-01-01 | End: 2021-01-01

## 2021-01-01 RX ORDER — ENOXAPARIN SODIUM 100 MG/ML
40 INJECTION SUBCUTANEOUS DAILY
Status: DISCONTINUED | OUTPATIENT
Start: 2021-01-01 | End: 2021-01-01

## 2021-01-01 RX ORDER — ACETAMINOPHEN 325 MG/1
650 TABLET ORAL
Status: DISCONTINUED | OUTPATIENT
Start: 2021-01-01 | End: 2021-01-01

## 2021-01-01 RX ORDER — ATROPINE SULFATE 0.1 MG/ML
INJECTION INTRAVENOUS
Status: DISPENSED
Start: 2021-01-01 | End: 2021-01-01

## 2021-01-01 RX ORDER — ALBUMIN HUMAN 50 G/1000ML
50 SOLUTION INTRAVENOUS ONCE
Status: COMPLETED | OUTPATIENT
Start: 2021-01-01 | End: 2021-01-01

## 2021-01-01 RX ORDER — SODIUM BICARBONATE 1 MEQ/ML
SYRINGE (ML) INTRAVENOUS
Status: COMPLETED
Start: 2021-01-01 | End: 2021-01-01

## 2021-01-01 RX ORDER — CHLORHEXIDINE GLUCONATE 0.12 MG/ML
15 RINSE ORAL EVERY 12 HOURS
Status: DISCONTINUED | OUTPATIENT
Start: 2021-01-01 | End: 2021-01-01

## 2021-01-01 RX ORDER — ROCURONIUM BROMIDE 10 MG/ML
50 INJECTION, SOLUTION INTRAVENOUS
Status: DISCONTINUED | OUTPATIENT
Start: 2021-01-01 | End: 2021-01-01

## 2021-01-01 RX ORDER — NOREPINEPHRINE BITARTRATE/D5W 8 MG/250ML
.5-3 PLASTIC BAG, INJECTION (ML) INTRAVENOUS
Status: DISCONTINUED | OUTPATIENT
Start: 2021-01-01 | End: 2021-01-01

## 2021-01-01 RX ORDER — PROMETHAZINE HYDROCHLORIDE 25 MG/1
12.5 TABLET ORAL
Status: DISCONTINUED | OUTPATIENT
Start: 2021-01-01 | End: 2021-01-01

## 2021-01-01 RX ORDER — FENTANYL CITRATE/PF 1500MCG/30
0-200 PATIENT CONTROLLED ANALGESIA SYRINGE INTRAVENOUS
Status: DISCONTINUED | OUTPATIENT
Start: 2021-01-01 | End: 2021-01-01 | Stop reason: SDUPTHER

## 2021-01-01 RX ORDER — HEPARIN SODIUM 5000 [USP'U]/ML
5000 INJECTION, SOLUTION INTRAVENOUS; SUBCUTANEOUS EVERY 8 HOURS
Status: DISCONTINUED | OUTPATIENT
Start: 2021-01-01 | End: 2021-01-01

## 2021-01-01 RX ORDER — FUROSEMIDE 10 MG/ML
80 INJECTION INTRAMUSCULAR; INTRAVENOUS DAILY
Status: DISCONTINUED | OUTPATIENT
Start: 2021-01-01 | End: 2021-01-01

## 2021-01-01 RX ORDER — MAGNESIUM SULFATE HEPTAHYDRATE 40 MG/ML
2 INJECTION, SOLUTION INTRAVENOUS AS NEEDED
Status: DISCONTINUED | OUTPATIENT
Start: 2021-01-01 | End: 2021-01-01

## 2021-01-01 RX ORDER — GADOTERATE MEGLUMINE 376.9 MG/ML
15 INJECTION INTRAVENOUS
Status: COMPLETED | OUTPATIENT
Start: 2021-01-01 | End: 2021-01-01

## 2021-01-01 RX ORDER — LORAZEPAM 2 MG/ML
2-4 INJECTION INTRAMUSCULAR
Status: DISCONTINUED | OUTPATIENT
Start: 2021-01-01 | End: 2021-01-01

## 2021-01-01 RX ORDER — FUROSEMIDE 10 MG/ML
40 INJECTION INTRAMUSCULAR; INTRAVENOUS DAILY
Status: DISCONTINUED | OUTPATIENT
Start: 2021-01-01 | End: 2021-01-01

## 2021-01-01 RX ORDER — DEXTROSE, SODIUM CHLORIDE, AND POTASSIUM CHLORIDE 5; .45; .3 G/100ML; G/100ML; G/100ML
INJECTION INTRAVENOUS CONTINUOUS
Status: DISCONTINUED | OUTPATIENT
Start: 2021-01-01 | End: 2021-01-01

## 2021-01-01 RX ORDER — MORPHINE SULFATE 2 MG/ML
2-4 INJECTION, SOLUTION INTRAMUSCULAR; INTRAVENOUS
Status: DISCONTINUED | OUTPATIENT
Start: 2021-01-01 | End: 2021-01-01

## 2021-01-01 RX ORDER — HYDROMORPHONE HYDROCHLORIDE 1 MG/ML
1 INJECTION, SOLUTION INTRAMUSCULAR; INTRAVENOUS; SUBCUTANEOUS EVERY 4 HOURS
Status: DISCONTINUED | OUTPATIENT
Start: 2021-01-01 | End: 2021-01-01 | Stop reason: HOSPADM

## 2021-01-01 RX ORDER — ATROPINE SULFATE 0.1 MG/ML
INJECTION INTRAVENOUS
Status: COMPLETED
Start: 2021-01-01 | End: 2021-01-01

## 2021-01-01 RX ORDER — ONDANSETRON 2 MG/ML
INJECTION INTRAMUSCULAR; INTRAVENOUS
Status: COMPLETED
Start: 2021-01-01 | End: 2021-01-01

## 2021-01-01 RX ORDER — GLYCOPYRROLATE 0.2 MG/ML
0.1 INJECTION INTRAMUSCULAR; INTRAVENOUS 3 TIMES DAILY
Status: DISCONTINUED | OUTPATIENT
Start: 2021-01-01 | End: 2021-01-01

## 2021-01-01 RX ORDER — MIDAZOLAM HYDROCHLORIDE 1 MG/ML
2 INJECTION, SOLUTION INTRAMUSCULAR; INTRAVENOUS ONCE
Status: COMPLETED | OUTPATIENT
Start: 2021-01-01 | End: 2021-01-01

## 2021-01-01 RX ADMIN — ONDANSETRON 4 MG: 2 INJECTION INTRAMUSCULAR; INTRAVENOUS at 15:38

## 2021-01-01 RX ADMIN — FUROSEMIDE 100 MG: 10 INJECTION, SOLUTION INTRAMUSCULAR; INTRAVENOUS at 08:16

## 2021-01-01 RX ADMIN — HYDROMORPHONE HYDROCHLORIDE 1 MG: 1 INJECTION, SOLUTION INTRAMUSCULAR; INTRAVENOUS; SUBCUTANEOUS at 13:43

## 2021-01-01 RX ADMIN — CASTOR OIL AND BALSAM, PERU: 788; 87 OINTMENT TOPICAL at 08:04

## 2021-01-01 RX ADMIN — FAMOTIDINE 20 MG: 10 INJECTION, SOLUTION INTRAVENOUS at 08:10

## 2021-01-01 RX ADMIN — Medication 10 ML: at 14:17

## 2021-01-01 RX ADMIN — ACETAMINOPHEN 650 MG: 325 TABLET ORAL at 08:37

## 2021-01-01 RX ADMIN — Medication 10 ML: at 05:03

## 2021-01-01 RX ADMIN — DEXTROSE MONOHYDRATE 13 MCG/MIN: 50 INJECTION, SOLUTION INTRAVENOUS at 15:46

## 2021-01-01 RX ADMIN — SODIUM CHLORIDE 125 ML/HR: 9 INJECTION, SOLUTION INTRAVENOUS at 13:35

## 2021-01-01 RX ADMIN — EPINEPHRINE 3 MCG/MIN: 1 INJECTION INTRAMUSCULAR; INTRAVENOUS; SUBCUTANEOUS at 04:55

## 2021-01-01 RX ADMIN — IOPAMIDOL 100 ML: 755 INJECTION, SOLUTION INTRAVENOUS at 11:57

## 2021-01-01 RX ADMIN — GADOTERATE MEGLUMINE 15 ML: 376.9 INJECTION INTRAVENOUS at 14:50

## 2021-01-01 RX ADMIN — CHLORHEXIDINE GLUCONATE 15 ML: 0.12 RINSE ORAL at 08:10

## 2021-01-01 RX ADMIN — HEPARIN SODIUM 5000 UNITS: 5000 INJECTION INTRAVENOUS; SUBCUTANEOUS at 14:43

## 2021-01-01 RX ADMIN — ALBUMIN (HUMAN) 50 G: 12.5 INJECTION, SOLUTION INTRAVENOUS at 09:36

## 2021-01-01 RX ADMIN — FAMOTIDINE 20 MG: 10 INJECTION, SOLUTION INTRAVENOUS at 13:31

## 2021-01-01 RX ADMIN — SODIUM ZIRCONIUM CYCLOSILICATE 10 G: 10 POWDER, FOR SUSPENSION ORAL at 20:19

## 2021-01-01 RX ADMIN — HEPARIN SODIUM 5000 UNITS: 5000 INJECTION INTRAVENOUS; SUBCUTANEOUS at 20:59

## 2021-01-01 RX ADMIN — Medication 10 ML: at 14:19

## 2021-01-01 RX ADMIN — CHLORHEXIDINE GLUCONATE 15 ML: 0.12 RINSE ORAL at 21:04

## 2021-01-01 RX ADMIN — POTASSIUM CHLORIDE 10 MEQ: 10 INJECTION, SOLUTION INTRAVENOUS at 11:57

## 2021-01-01 RX ADMIN — PIPERACILLIN AND TAZOBACTAM 3.38 G: 3; .375 INJECTION, POWDER, LYOPHILIZED, FOR SOLUTION INTRAVENOUS at 12:22

## 2021-01-01 RX ADMIN — CASTOR OIL AND BALSAM, PERU: 788; 87 OINTMENT TOPICAL at 20:40

## 2021-01-01 RX ADMIN — LORAZEPAM 4 MG: 2 INJECTION INTRAMUSCULAR; INTRAVENOUS at 08:04

## 2021-01-01 RX ADMIN — HYDROMORPHONE HYDROCHLORIDE 1 MG: 1 INJECTION, SOLUTION INTRAMUSCULAR; INTRAVENOUS; SUBCUTANEOUS at 08:22

## 2021-01-01 RX ADMIN — CASTOR OIL AND BALSAM, PERU: 788; 87 OINTMENT TOPICAL at 20:55

## 2021-01-01 RX ADMIN — Medication 10 ML: at 13:09

## 2021-01-01 RX ADMIN — CHLORHEXIDINE GLUCONATE 15 ML: 0.12 RINSE ORAL at 08:17

## 2021-01-01 RX ADMIN — SODIUM CHLORIDE 10 ML/HR: 9 INJECTION, SOLUTION INTRAVENOUS at 05:08

## 2021-01-01 RX ADMIN — Medication 125 MCG/HR: at 03:47

## 2021-01-01 RX ADMIN — SODIUM CHLORIDE 10 ML/HR: 9 INJECTION, SOLUTION INTRAVENOUS at 01:52

## 2021-01-01 RX ADMIN — Medication 1 AMPULE: at 08:24

## 2021-01-01 RX ADMIN — HYDROMORPHONE HYDROCHLORIDE 1 MG: 1 INJECTION, SOLUTION INTRAMUSCULAR; INTRAVENOUS; SUBCUTANEOUS at 20:54

## 2021-01-01 RX ADMIN — FAMOTIDINE 20 MG: 10 INJECTION, SOLUTION INTRAVENOUS at 08:21

## 2021-01-01 RX ADMIN — EPINEPHRINE 20 MCG/MIN: 1 INJECTION INTRAMUSCULAR; INTRAVENOUS; SUBCUTANEOUS at 02:31

## 2021-01-01 RX ADMIN — HEPARIN SODIUM 5000 UNITS: 5000 INJECTION INTRAVENOUS; SUBCUTANEOUS at 15:48

## 2021-01-01 RX ADMIN — SODIUM CHLORIDE 1000 ML: 9 INJECTION, SOLUTION INTRAVENOUS at 11:53

## 2021-01-01 RX ADMIN — LORAZEPAM 4 MG: 2 INJECTION INTRAMUSCULAR; INTRAVENOUS at 17:32

## 2021-01-01 RX ADMIN — PIPERACILLIN AND TAZOBACTAM 3.38 G: 3; .375 INJECTION, POWDER, LYOPHILIZED, FOR SOLUTION INTRAVENOUS at 19:45

## 2021-01-01 RX ADMIN — PIPERACILLIN AND TAZOBACTAM 3.38 G: 3; .375 INJECTION, POWDER, LYOPHILIZED, FOR SOLUTION INTRAVENOUS at 20:11

## 2021-01-01 RX ADMIN — Medication 1 AMPULE: at 08:41

## 2021-01-01 RX ADMIN — Medication 10 ML: at 08:09

## 2021-01-01 RX ADMIN — HYDROMORPHONE HYDROCHLORIDE 1 MG: 1 INJECTION, SOLUTION INTRAMUSCULAR; INTRAVENOUS; SUBCUTANEOUS at 23:10

## 2021-01-01 RX ADMIN — CASTOR OIL AND BALSAM, PERU: 788; 87 OINTMENT TOPICAL at 11:18

## 2021-01-01 RX ADMIN — SODIUM ZIRCONIUM CYCLOSILICATE 10 G: 10 POWDER, FOR SUSPENSION ORAL at 11:46

## 2021-01-01 RX ADMIN — Medication 10 ML: at 06:00

## 2021-01-01 RX ADMIN — CHLORHEXIDINE GLUCONATE 15 ML: 0.12 RINSE ORAL at 20:44

## 2021-01-01 RX ADMIN — Medication 1 AMPULE: at 15:57

## 2021-01-01 RX ADMIN — CASTOR OIL AND BALSAM, PERU: 788; 87 OINTMENT TOPICAL at 18:20

## 2021-01-01 RX ADMIN — PIPERACILLIN AND TAZOBACTAM 3.38 G: 3; .375 INJECTION, POWDER, LYOPHILIZED, FOR SOLUTION INTRAVENOUS at 04:22

## 2021-01-01 RX ADMIN — Medication 10 ML: at 06:21

## 2021-01-01 RX ADMIN — Medication 50 MCG/HR: at 13:47

## 2021-01-01 RX ADMIN — SODIUM ZIRCONIUM CYCLOSILICATE 10 G: 10 POWDER, FOR SUSPENSION ORAL at 03:33

## 2021-01-01 RX ADMIN — CASTOR OIL AND BALSAM, PERU: 788; 87 OINTMENT TOPICAL at 20:18

## 2021-01-01 RX ADMIN — Medication 10 ML: at 21:12

## 2021-01-01 RX ADMIN — CHLORHEXIDINE GLUCONATE 15 ML: 0.12 RINSE ORAL at 20:19

## 2021-01-01 RX ADMIN — AMIODARONE HYDROCHLORIDE 150 MG: 1.5 INJECTION, SOLUTION INTRAVENOUS at 18:25

## 2021-01-01 RX ADMIN — SODIUM CHLORIDE 125 ML/HR: 9 INJECTION, SOLUTION INTRAVENOUS at 01:25

## 2021-01-01 RX ADMIN — Medication 1 AMPULE: at 08:10

## 2021-01-01 RX ADMIN — CASTOR OIL AND BALSAM, PERU: 788; 87 OINTMENT TOPICAL at 08:50

## 2021-01-01 RX ADMIN — FUROSEMIDE 80 MG: 10 INJECTION, SOLUTION INTRAMUSCULAR; INTRAVENOUS at 08:21

## 2021-01-01 RX ADMIN — POTASSIUM CHLORIDE 10 MEQ: 10 INJECTION, SOLUTION INTRAVENOUS at 11:07

## 2021-01-01 RX ADMIN — HEPARIN SODIUM 5000 UNITS: 5000 INJECTION INTRAVENOUS; SUBCUTANEOUS at 05:47

## 2021-01-01 RX ADMIN — GLYCOPYRROLATE 0.2 MG: 0.2 INJECTION INTRAMUSCULAR; INTRAVENOUS at 13:42

## 2021-01-01 RX ADMIN — SODIUM CHLORIDE 125 ML/HR: 9 INJECTION, SOLUTION INTRAVENOUS at 08:57

## 2021-01-01 RX ADMIN — DEXTROSE MONOHYDRATE, SODIUM CHLORIDE, AND POTASSIUM CHLORIDE: 50; 4.5; 2.98 INJECTION, SOLUTION INTRAVENOUS at 22:55

## 2021-01-01 RX ADMIN — HEPARIN SODIUM 5000 UNITS: 5000 INJECTION INTRAVENOUS; SUBCUTANEOUS at 14:02

## 2021-01-01 RX ADMIN — CHLORHEXIDINE GLUCONATE 15 ML: 0.12 RINSE ORAL at 08:39

## 2021-01-01 RX ADMIN — Medication 20 ML: at 05:13

## 2021-01-01 RX ADMIN — LORAZEPAM 2 MG: 2 INJECTION INTRAMUSCULAR; INTRAVENOUS at 23:38

## 2021-01-01 RX ADMIN — CHLORHEXIDINE GLUCONATE 15 ML: 0.12 RINSE ORAL at 08:21

## 2021-01-01 RX ADMIN — HYDROMORPHONE HYDROCHLORIDE 1 MG: 1 INJECTION, SOLUTION INTRAMUSCULAR; INTRAVENOUS; SUBCUTANEOUS at 15:37

## 2021-01-01 RX ADMIN — HEPARIN SODIUM 5000 UNITS: 5000 INJECTION INTRAVENOUS; SUBCUTANEOUS at 05:36

## 2021-01-01 RX ADMIN — CASTOR OIL AND BALSAM, PERU: 788; 87 OINTMENT TOPICAL at 21:05

## 2021-01-01 RX ADMIN — CASTOR OIL AND BALSAM, PERU: 788; 87 OINTMENT TOPICAL at 08:10

## 2021-01-01 RX ADMIN — EPINEPHRINE 14 MCG/MIN: 1 INJECTION INTRAMUSCULAR; INTRAVENOUS; SUBCUTANEOUS at 19:23

## 2021-01-01 RX ADMIN — HYDROMORPHONE HYDROCHLORIDE 1 MG: 1 INJECTION, SOLUTION INTRAMUSCULAR; INTRAVENOUS; SUBCUTANEOUS at 15:55

## 2021-01-01 RX ADMIN — EPINEPHRINE 3 MCG/MIN: 1 INJECTION INTRAMUSCULAR; INTRAVENOUS; SUBCUTANEOUS at 15:46

## 2021-01-01 RX ADMIN — CASTOR OIL AND BALSAM, PERU: 788; 87 OINTMENT TOPICAL at 08:17

## 2021-01-01 RX ADMIN — CASTOR OIL AND BALSAM, PERU: 788; 87 OINTMENT TOPICAL at 18:56

## 2021-01-01 RX ADMIN — SODIUM ZIRCONIUM CYCLOSILICATE 10 G: 10 POWDER, FOR SUSPENSION ORAL at 12:23

## 2021-01-01 RX ADMIN — GLYCOPYRROLATE 0.2 MG: 0.2 INJECTION INTRAMUSCULAR; INTRAVENOUS at 23:10

## 2021-01-01 RX ADMIN — Medication 10 ML: at 14:03

## 2021-01-01 RX ADMIN — Medication 100 MEQ: at 11:45

## 2021-01-01 RX ADMIN — HEPARIN SODIUM 5000 UNITS: 5000 INJECTION INTRAVENOUS; SUBCUTANEOUS at 05:54

## 2021-01-01 RX ADMIN — HEPARIN SODIUM 5000 UNITS: 5000 INJECTION INTRAVENOUS; SUBCUTANEOUS at 05:31

## 2021-01-01 RX ADMIN — SODIUM CHLORIDE 50 ML/HR: 4.5 INJECTION, SOLUTION INTRAVENOUS at 12:04

## 2021-01-01 RX ADMIN — DEXTROSE MONOHYDRATE, SODIUM CHLORIDE, AND POTASSIUM CHLORIDE: 50; 4.5; 2.98 INJECTION, SOLUTION INTRAVENOUS at 01:25

## 2021-01-01 RX ADMIN — Medication 1 AMPULE: at 08:51

## 2021-01-01 RX ADMIN — Medication 1 AMPULE: at 20:44

## 2021-01-01 RX ADMIN — DEXTROSE MONOHYDRATE, SODIUM CHLORIDE, AND POTASSIUM CHLORIDE: 50; 4.5; 2.98 INJECTION, SOLUTION INTRAVENOUS at 09:57

## 2021-01-01 RX ADMIN — GLYCOPYRROLATE 0.2 MG: 0.2 INJECTION INTRAMUSCULAR; INTRAVENOUS at 15:37

## 2021-01-01 RX ADMIN — PIPERACILLIN AND TAZOBACTAM 3.38 G: 3; .375 INJECTION, POWDER, LYOPHILIZED, FOR SOLUTION INTRAVENOUS at 20:29

## 2021-01-01 RX ADMIN — CASTOR OIL AND BALSAM, PERU: 788; 87 OINTMENT TOPICAL at 08:38

## 2021-01-01 RX ADMIN — Medication 10 ML: at 21:13

## 2021-01-01 RX ADMIN — SODIUM CHLORIDE 50 ML/HR: 4.5 INJECTION, SOLUTION INTRAVENOUS at 07:13

## 2021-01-01 RX ADMIN — HYDROMORPHONE HYDROCHLORIDE 1 MG: 1 INJECTION, SOLUTION INTRAMUSCULAR; INTRAVENOUS; SUBCUTANEOUS at 00:18

## 2021-01-01 RX ADMIN — Medication 10 ML: at 14:43

## 2021-01-01 RX ADMIN — HYDROMORPHONE HYDROCHLORIDE 1 MG: 1 INJECTION, SOLUTION INTRAMUSCULAR; INTRAVENOUS; SUBCUTANEOUS at 06:38

## 2021-01-01 RX ADMIN — Medication 1 AMPULE: at 21:44

## 2021-01-01 RX ADMIN — DEXTROSE MONOHYDRATE 5 MCG/MIN: 50 INJECTION, SOLUTION INTRAVENOUS at 11:45

## 2021-01-01 RX ADMIN — POTASSIUM BICARBONATE 40 MEQ: 782 TABLET, EFFERVESCENT ORAL at 12:54

## 2021-01-01 RX ADMIN — POTASSIUM CHLORIDE 10 MEQ: 10 INJECTION, SOLUTION INTRAVENOUS at 10:49

## 2021-01-01 RX ADMIN — FENTANYL CITRATE 100 MCG: 50 INJECTION INTRAMUSCULAR; INTRAVENOUS at 14:35

## 2021-01-01 RX ADMIN — FUROSEMIDE 40 MG: 10 INJECTION, SOLUTION INTRAMUSCULAR; INTRAVENOUS at 08:48

## 2021-01-01 RX ADMIN — PIPERACILLIN AND TAZOBACTAM 3.38 G: 3; .375 INJECTION, POWDER, LYOPHILIZED, FOR SOLUTION INTRAVENOUS at 16:24

## 2021-01-01 RX ADMIN — GLYCOPYRROLATE 0.2 MG: 0.2 INJECTION INTRAMUSCULAR; INTRAVENOUS at 06:38

## 2021-01-01 RX ADMIN — SODIUM CHLORIDE 125 ML/HR: 9 INJECTION, SOLUTION INTRAVENOUS at 16:58

## 2021-01-01 RX ADMIN — HYDROMORPHONE HYDROCHLORIDE 1 MG: 1 INJECTION, SOLUTION INTRAMUSCULAR; INTRAVENOUS; SUBCUTANEOUS at 17:33

## 2021-01-01 RX ADMIN — AMIODARONE HYDROCHLORIDE 0.5 MG/MIN: 50 INJECTION, SOLUTION INTRAVENOUS at 03:04

## 2021-01-01 RX ADMIN — HEPARIN SODIUM 5000 UNITS: 5000 INJECTION INTRAVENOUS; SUBCUTANEOUS at 21:57

## 2021-01-01 RX ADMIN — PIPERACILLIN AND TAZOBACTAM 3.38 G: 3; .375 INJECTION, POWDER, LYOPHILIZED, FOR SOLUTION INTRAVENOUS at 04:12

## 2021-01-01 RX ADMIN — CHLORHEXIDINE GLUCONATE 15 ML: 0.12 RINSE ORAL at 20:39

## 2021-01-01 RX ADMIN — CHLORHEXIDINE GLUCONATE 15 ML: 0.12 RINSE ORAL at 08:07

## 2021-01-01 RX ADMIN — EPINEPHRINE 4 MCG/MIN: 1 INJECTION INTRAMUSCULAR; INTRAVENOUS; SUBCUTANEOUS at 19:47

## 2021-01-01 RX ADMIN — CASTOR OIL AND BALSAM, PERU: 788; 87 OINTMENT TOPICAL at 21:29

## 2021-01-01 RX ADMIN — CHLORHEXIDINE GLUCONATE 15 ML: 0.12 RINSE ORAL at 20:54

## 2021-01-01 RX ADMIN — Medication 1 AMPULE: at 21:04

## 2021-01-01 RX ADMIN — ACETAMINOPHEN 650 MG: 650 SUSPENSION ORAL at 07:47

## 2021-01-01 RX ADMIN — EPINEPHRINE 3 MCG/MIN: 1 INJECTION INTRAMUSCULAR; INTRAVENOUS; SUBCUTANEOUS at 13:07

## 2021-01-01 RX ADMIN — SODIUM BICARBONATE 100 MEQ: 84 INJECTION, SOLUTION INTRAVENOUS at 11:45

## 2021-01-01 RX ADMIN — SODIUM CHLORIDE 100 ML/HR: 4.5 INJECTION, SOLUTION INTRAVENOUS at 02:29

## 2021-01-01 RX ADMIN — HYDROMORPHONE HYDROCHLORIDE 1 MG: 1 INJECTION, SOLUTION INTRAMUSCULAR; INTRAVENOUS; SUBCUTANEOUS at 08:04

## 2021-01-01 RX ADMIN — LORAZEPAM 2 MG: 2 INJECTION INTRAMUSCULAR; INTRAVENOUS at 15:37

## 2021-01-01 RX ADMIN — Medication 10 ML: at 13:53

## 2021-01-01 RX ADMIN — HEPARIN SODIUM 5000 UNITS: 5000 INJECTION INTRAVENOUS; SUBCUTANEOUS at 05:44

## 2021-01-01 RX ADMIN — CHLORHEXIDINE GLUCONATE 15 ML: 0.12 RINSE ORAL at 08:38

## 2021-01-01 RX ADMIN — HEPARIN SODIUM 5000 UNITS: 5000 INJECTION INTRAVENOUS; SUBCUTANEOUS at 06:21

## 2021-01-01 RX ADMIN — HEPARIN SODIUM 5000 UNITS: 5000 INJECTION INTRAVENOUS; SUBCUTANEOUS at 05:39

## 2021-01-01 RX ADMIN — FAMOTIDINE 20 MG: 10 INJECTION, SOLUTION INTRAVENOUS at 08:11

## 2021-01-01 RX ADMIN — EPINEPHRINE 8 MCG/MIN: 1 INJECTION INTRAMUSCULAR; INTRAVENOUS; SUBCUTANEOUS at 20:35

## 2021-01-01 RX ADMIN — PIPERACILLIN AND TAZOBACTAM 3.38 G: 3; .375 INJECTION, POWDER, LYOPHILIZED, FOR SOLUTION INTRAVENOUS at 04:52

## 2021-01-01 RX ADMIN — CASTOR OIL AND BALSAM, PERU: 788; 87 OINTMENT TOPICAL at 22:00

## 2021-01-01 RX ADMIN — HYDROMORPHONE HYDROCHLORIDE 1 MG: 1 INJECTION, SOLUTION INTRAMUSCULAR; INTRAVENOUS; SUBCUTANEOUS at 11:17

## 2021-01-01 RX ADMIN — FUROSEMIDE 80 MG: 10 INJECTION, SOLUTION INTRAMUSCULAR; INTRAVENOUS at 08:25

## 2021-01-01 RX ADMIN — Medication 1 AMPULE: at 08:37

## 2021-01-01 RX ADMIN — Medication 10 ML: at 05:44

## 2021-01-01 RX ADMIN — GLYCOPYRROLATE 0.2 MG: 0.2 INJECTION INTRAMUSCULAR; INTRAVENOUS at 08:03

## 2021-01-01 RX ADMIN — HEPARIN SODIUM 5000 UNITS: 5000 INJECTION INTRAVENOUS; SUBCUTANEOUS at 21:56

## 2021-01-01 RX ADMIN — PIPERACILLIN AND TAZOBACTAM 3.38 G: 3; .375 INJECTION, POWDER, LYOPHILIZED, FOR SOLUTION INTRAVENOUS at 11:58

## 2021-01-01 RX ADMIN — GLYCOPYRROLATE 0.2 MG: 0.2 INJECTION INTRAMUSCULAR; INTRAVENOUS at 05:35

## 2021-01-01 RX ADMIN — EPINEPHRINE 5 MCG/MIN: 1 INJECTION INTRAMUSCULAR; INTRAVENOUS; SUBCUTANEOUS at 09:57

## 2021-01-01 RX ADMIN — Medication 10 ML: at 05:54

## 2021-01-01 RX ADMIN — FENTANYL CITRATE 100 MCG: 50 INJECTION INTRAMUSCULAR; INTRAVENOUS at 03:46

## 2021-01-01 RX ADMIN — POTASSIUM CHLORIDE 10 MEQ: 10 INJECTION, SOLUTION INTRAVENOUS at 08:37

## 2021-01-01 RX ADMIN — DEXTROSE MONOHYDRATE, SODIUM CHLORIDE, AND POTASSIUM CHLORIDE: 50; 4.5; 2.98 INJECTION, SOLUTION INTRAVENOUS at 15:53

## 2021-01-01 RX ADMIN — FAMOTIDINE 20 MG: 10 INJECTION, SOLUTION INTRAVENOUS at 08:25

## 2021-01-01 RX ADMIN — CASTOR OIL AND BALSAM, PERU: 788; 87 OINTMENT TOPICAL at 17:45

## 2021-01-01 RX ADMIN — Medication 1 AMPULE: at 08:21

## 2021-01-01 RX ADMIN — LORAZEPAM 2 MG: 2 INJECTION INTRAMUSCULAR; INTRAVENOUS at 04:23

## 2021-01-01 RX ADMIN — ENOXAPARIN SODIUM 40 MG: 40 INJECTION SUBCUTANEOUS at 10:13

## 2021-01-01 RX ADMIN — DEXTROSE MONOHYDRATE, SODIUM CHLORIDE, AND POTASSIUM CHLORIDE: 50; 4.5; 2.98 INJECTION, SOLUTION INTRAVENOUS at 12:11

## 2021-01-01 RX ADMIN — Medication 1 AMPULE: at 08:04

## 2021-01-01 RX ADMIN — SODIUM ZIRCONIUM CYCLOSILICATE 10 G: 10 POWDER, FOR SUSPENSION ORAL at 04:09

## 2021-01-01 RX ADMIN — EPINEPHRINE 18 MCG/MIN: 1 INJECTION INTRAMUSCULAR; INTRAVENOUS; SUBCUTANEOUS at 12:52

## 2021-01-01 RX ADMIN — HYDROMORPHONE HYDROCHLORIDE 1 MG: 1 INJECTION, SOLUTION INTRAMUSCULAR; INTRAVENOUS; SUBCUTANEOUS at 15:59

## 2021-01-01 RX ADMIN — PIPERACILLIN AND TAZOBACTAM 3.38 G: 3; .375 INJECTION, POWDER, LYOPHILIZED, FOR SOLUTION INTRAVENOUS at 12:10

## 2021-01-01 RX ADMIN — MAGNESIUM SULFATE HEPTAHYDRATE 2 G: 40 INJECTION, SOLUTION INTRAVENOUS at 08:37

## 2021-01-01 RX ADMIN — HEPARIN SODIUM 5000 UNITS: 5000 INJECTION INTRAVENOUS; SUBCUTANEOUS at 13:53

## 2021-01-01 RX ADMIN — Medication 1 AMPULE: at 20:19

## 2021-01-01 RX ADMIN — FUROSEMIDE 40 MG: 10 INJECTION, SOLUTION INTRAMUSCULAR; INTRAVENOUS at 08:11

## 2021-01-01 RX ADMIN — CHLORHEXIDINE GLUCONATE 15 ML: 0.12 RINSE ORAL at 21:44

## 2021-01-01 RX ADMIN — Medication 10 ML: at 21:56

## 2021-01-01 RX ADMIN — AMIODARONE HYDROCHLORIDE 1 MG/MIN: 50 INJECTION, SOLUTION INTRAVENOUS at 18:57

## 2021-01-01 RX ADMIN — CASTOR OIL AND BALSAM, PERU: 788; 87 OINTMENT TOPICAL at 20:20

## 2021-01-01 RX ADMIN — CHLORHEXIDINE GLUCONATE 15 ML: 0.12 RINSE ORAL at 08:27

## 2021-01-01 RX ADMIN — DEXTROSE MONOHYDRATE 20 MCG/MIN: 50 INJECTION, SOLUTION INTRAVENOUS at 02:30

## 2021-01-01 RX ADMIN — SODIUM CHLORIDE 125 ML/HR: 9 INJECTION, SOLUTION INTRAVENOUS at 09:28

## 2021-01-01 RX ADMIN — DEXTROSE MONOHYDRATE 16 MCG/MIN: 50 INJECTION, SOLUTION INTRAVENOUS at 15:26

## 2021-01-01 RX ADMIN — DEXTROSE MONOHYDRATE 25 MCG/MIN: 50 INJECTION, SOLUTION INTRAVENOUS at 04:44

## 2021-01-01 RX ADMIN — Medication 1 AMPULE: at 20:20

## 2021-01-01 RX ADMIN — Medication 10 ML: at 15:58

## 2021-01-01 RX ADMIN — HEPARIN SODIUM 5000 UNITS: 5000 INJECTION INTRAVENOUS; SUBCUTANEOUS at 21:52

## 2021-01-01 RX ADMIN — CASTOR OIL AND BALSAM, PERU: 788; 87 OINTMENT TOPICAL at 21:04

## 2021-01-01 RX ADMIN — HYDROMORPHONE HYDROCHLORIDE 1 MG: 1 INJECTION, SOLUTION INTRAMUSCULAR; INTRAVENOUS; SUBCUTANEOUS at 04:23

## 2021-01-01 RX ADMIN — GLYCOPYRROLATE 0.2 MG: 0.2 INJECTION INTRAMUSCULAR; INTRAVENOUS at 20:54

## 2021-01-01 RX ADMIN — LORAZEPAM 2 MG: 2 INJECTION INTRAMUSCULAR; INTRAVENOUS at 11:34

## 2021-01-01 RX ADMIN — FAMOTIDINE 20 MG: 10 INJECTION, SOLUTION INTRAVENOUS at 08:39

## 2021-01-01 RX ADMIN — Medication 10 ML: at 22:00

## 2021-01-01 RX ADMIN — ATROPINE SULFATE 1 MG: 0.1 INJECTION, SOLUTION INTRAVENOUS at 13:10

## 2021-01-01 RX ADMIN — Medication 10 ML: at 15:49

## 2021-01-01 RX ADMIN — Medication 1 AMPULE: at 20:54

## 2021-01-01 RX ADMIN — LORAZEPAM 2 MG: 2 INJECTION INTRAMUSCULAR; INTRAVENOUS at 15:55

## 2021-01-01 RX ADMIN — POTASSIUM CHLORIDE 10 MEQ: 10 INJECTION, SOLUTION INTRAVENOUS at 09:54

## 2021-01-01 RX ADMIN — CASTOR OIL AND BALSAM, PERU: 788; 87 OINTMENT TOPICAL at 08:22

## 2021-01-01 RX ADMIN — CASTOR OIL AND BALSAM, PERU: 788; 87 OINTMENT TOPICAL at 08:27

## 2021-01-01 RX ADMIN — PIPERACILLIN AND TAZOBACTAM 3.38 G: 3; .375 INJECTION, POWDER, LYOPHILIZED, FOR SOLUTION INTRAVENOUS at 20:54

## 2021-01-01 RX ADMIN — HEPARIN SODIUM 5000 UNITS: 5000 INJECTION INTRAVENOUS; SUBCUTANEOUS at 21:40

## 2021-01-01 RX ADMIN — ONDANSETRON 4 MG: 2 INJECTION INTRAMUSCULAR; INTRAVENOUS at 13:29

## 2021-01-01 RX ADMIN — FENTANYL CITRATE 100 MCG: 50 INJECTION INTRAMUSCULAR; INTRAVENOUS at 12:00

## 2021-01-01 RX ADMIN — ATROPINE SULFATE 1 MG: 0.1 INJECTION INTRAVENOUS at 13:10

## 2021-01-01 RX ADMIN — SODIUM CHLORIDE 50 ML/HR: 4.5 INJECTION, SOLUTION INTRAVENOUS at 15:57

## 2021-01-01 RX ADMIN — CHLORHEXIDINE GLUCONATE 15 ML: 0.12 RINSE ORAL at 08:53

## 2021-01-01 RX ADMIN — CALCIUM GLUCONATE 1 G: 98 INJECTION, SOLUTION INTRAVENOUS at 10:13

## 2021-01-01 RX ADMIN — Medication 10 ML: at 21:44

## 2021-01-01 RX ADMIN — PIPERACILLIN AND TAZOBACTAM 3.38 G: 3; .375 INJECTION, POWDER, LYOPHILIZED, FOR SOLUTION INTRAVENOUS at 12:04

## 2021-01-01 RX ADMIN — FENTANYL CITRATE 100 MCG: 50 INJECTION INTRAMUSCULAR; INTRAVENOUS at 16:44

## 2021-01-01 RX ADMIN — Medication 40 ML: at 05:52

## 2021-01-01 RX ADMIN — HYDROMORPHONE HYDROCHLORIDE 1 MG: 1 INJECTION, SOLUTION INTRAMUSCULAR; INTRAVENOUS; SUBCUTANEOUS at 11:34

## 2021-01-01 RX ADMIN — EPINEPHRINE 20 MCG/MIN: 1 INJECTION INTRAMUSCULAR; INTRAVENOUS; SUBCUTANEOUS at 04:21

## 2021-01-01 RX ADMIN — CHLORHEXIDINE GLUCONATE 15 ML: 0.12 RINSE ORAL at 21:22

## 2021-01-01 RX ADMIN — LORAZEPAM 2 MG: 2 INJECTION INTRAMUSCULAR; INTRAVENOUS at 20:53

## 2021-01-01 RX ADMIN — HEPARIN SODIUM 5000 UNITS: 5000 INJECTION INTRAVENOUS; SUBCUTANEOUS at 14:19

## 2021-01-01 RX ADMIN — FAMOTIDINE 20 MG: 10 INJECTION, SOLUTION INTRAVENOUS at 08:37

## 2021-01-01 RX ADMIN — Medication 1 AMPULE: at 08:20

## 2021-01-01 RX ADMIN — POTASSIUM CHLORIDE 10 MEQ: 10 INJECTION, SOLUTION INTRAVENOUS at 08:38

## 2021-01-01 RX ADMIN — Medication 10 ML: at 21:42

## 2021-01-01 RX ADMIN — HYDROMORPHONE HYDROCHLORIDE 1 MG: 1 INJECTION, SOLUTION INTRAMUSCULAR; INTRAVENOUS; SUBCUTANEOUS at 04:04

## 2021-01-01 RX ADMIN — PIPERACILLIN AND TAZOBACTAM 3.38 G: 3; .375 INJECTION, POWDER, LYOPHILIZED, FOR SOLUTION INTRAVENOUS at 10:06

## 2021-01-01 RX ADMIN — EPINEPHRINE 2 MCG/MIN: 1 INJECTION INTRAMUSCULAR; INTRAVENOUS; SUBCUTANEOUS at 16:34

## 2021-01-01 RX ADMIN — SODIUM ZIRCONIUM CYCLOSILICATE 10 G: 10 POWDER, FOR SUSPENSION ORAL at 20:34

## 2021-01-01 RX ADMIN — SODIUM CHLORIDE 100 ML/HR: 4.5 INJECTION, SOLUTION INTRAVENOUS at 16:18

## 2021-01-01 RX ADMIN — Medication 10 ML: at 21:53

## 2021-01-01 RX ADMIN — HEPARIN SODIUM 5000 UNITS: 5000 INJECTION INTRAVENOUS; SUBCUTANEOUS at 14:37

## 2021-01-01 RX ADMIN — EPINEPHRINE 20 MCG/MIN: 1 INJECTION INTRAMUSCULAR; INTRAVENOUS; SUBCUTANEOUS at 08:32

## 2021-01-01 RX ADMIN — HYDROMORPHONE HYDROCHLORIDE 1 MG: 1 INJECTION, SOLUTION INTRAMUSCULAR; INTRAVENOUS; SUBCUTANEOUS at 20:19

## 2021-01-01 RX ADMIN — DEXTROSE MONOHYDRATE 25 MCG/MIN: 50 INJECTION, SOLUTION INTRAVENOUS at 08:33

## 2021-01-01 RX ADMIN — Medication 10 ML: at 20:48

## 2021-01-01 RX ADMIN — POTASSIUM CHLORIDE 10 MEQ: 10 INJECTION, SOLUTION INTRAVENOUS at 09:40

## 2021-01-01 RX ADMIN — CHLORHEXIDINE GLUCONATE 15 ML: 0.12 RINSE ORAL at 20:20

## 2021-01-01 RX ADMIN — Medication 1 AMPULE: at 21:06

## 2021-01-01 RX ADMIN — FAMOTIDINE 20 MG: 10 INJECTION, SOLUTION INTRAVENOUS at 08:14

## 2021-01-01 RX ADMIN — EPINEPHRINE 14 MCG/MIN: 1 INJECTION INTRAMUSCULAR; INTRAVENOUS; SUBCUTANEOUS at 01:27

## 2021-01-01 RX ADMIN — DEXTROSE MONOHYDRATE, SODIUM CHLORIDE, AND POTASSIUM CHLORIDE: 50; 4.5; 2.98 INJECTION, SOLUTION INTRAVENOUS at 05:04

## 2021-01-01 RX ADMIN — SODIUM BICARBONATE 50 MEQ: 84 INJECTION, SOLUTION INTRAVENOUS at 05:35

## 2021-01-01 RX ADMIN — HYDROMORPHONE HYDROCHLORIDE 1 MG: 1 INJECTION, SOLUTION INTRAMUSCULAR; INTRAVENOUS; SUBCUTANEOUS at 23:39

## 2021-01-01 RX ADMIN — Medication 1 AMPULE: at 20:39

## 2021-01-01 RX ADMIN — GLYCOPYRROLATE 0.2 MG: 0.2 INJECTION INTRAMUSCULAR; INTRAVENOUS at 17:32

## 2021-01-01 RX ADMIN — PIPERACILLIN AND TAZOBACTAM 3.38 G: 3; .375 INJECTION, POWDER, LYOPHILIZED, FOR SOLUTION INTRAVENOUS at 03:33

## 2021-01-01 RX ADMIN — HEPARIN SODIUM 5000 UNITS: 5000 INJECTION INTRAVENOUS; SUBCUTANEOUS at 21:12

## 2021-01-01 RX ADMIN — MIDAZOLAM 2 MG: 1 INJECTION INTRAMUSCULAR; INTRAVENOUS at 22:50

## 2021-01-01 RX ADMIN — EPINEPHRINE 20 MCG/MIN: 1 INJECTION INTRAMUSCULAR; INTRAVENOUS; SUBCUTANEOUS at 14:00

## 2021-01-01 RX ADMIN — POTASSIUM BICARBONATE 20 MEQ: 391 TABLET, EFFERVESCENT ORAL at 08:26

## 2021-01-01 RX ADMIN — CASTOR OIL AND BALSAM, PERU: 788; 87 OINTMENT TOPICAL at 08:26

## 2021-01-01 RX ADMIN — Medication 10 ML: at 13:06

## 2021-01-01 RX ADMIN — FENTANYL CITRATE 100 MCG: 50 INJECTION, SOLUTION INTRAMUSCULAR; INTRAVENOUS at 09:10

## 2021-01-01 RX ADMIN — FAMOTIDINE 20 MG: 10 INJECTION, SOLUTION INTRAVENOUS at 08:05

## 2021-03-29 PROBLEM — I46.9 CARDIAC ARREST (HCC): Status: ACTIVE | Noted: 2021-01-01

## 2021-03-29 NOTE — H&P
GENERAL GENERIC H&P/CONSULT Subjective: 80 y/o M found down after a bike ride. Initially PEA, received epi. Convert to V-fib and received 1 defibrillation. ROSC en route to the ED. Estimated down time was 30-60 mins. Past Medical History:  
Diagnosis Date  Aortic stenosis  CAD (coronary artery disease) 1989 MI  
 Carotid stenosis   
 mild bilateral  
 Heart failure ([address] ischemic cardiomyopathy  Hypertension Past Surgical History:  
Procedure Laterality Date  HX HERNIA REPAIR  2012  [place] CABG, ARTERY-VEIN, FOUR  2009  [place] CARDIAC SURG PROCEDURE UNLIST  06/30/2017 TAVR  VASCULAR SURGERY PROCEDURE UNLIST  2014  
 bilat popliteal stenting Prior to Admission medications Medication Sig Start Date End Date Taking? Authorizing Provider COQ10, LIPOSOMAL UBIQUINOL, PO Take  by mouth. Provider, Historical  
atorvastatin (LIPITOR) 40 mg tablet  4/20/18   Provider, Historical  
aspirin 81 mg chewable tablet Take  by mouth daily. Provider, Historical  
carvedilol (COREG) 12.5 mg tablet Take 12.5 mg by mouth two (2) times daily (with meals). Provider, Historical  
colchicine (COLCRYS) 0.6 mg tablet Take 0.6 mg by mouth as needed (for gout). Provider, Historical  
prasugrel (EFFIENT) 10 mg tablet Take 10 mg by mouth daily. Provider, Historical  
furosemide (LASIX) 20 mg tablet Take 20 mg by mouth daily as needed. Provider, Historical  
lisinopril (PRINIVIL, ZESTRIL) 5 mg tablet Take 5 mg by mouth daily. Provider, Historical  
ezetimibe (ZETIA) 10 mg tablet Take 10 mg by mouth daily. Provider, Historical  
potassium chloride SR (K-TAB) 20 mEq tablet Take 20 mEq by mouth daily as needed (takes with lasix). Provider, Historical  
 
Allergies Allergen Reactions  Niacin Other (comments) Severe flushing Social History Tobacco Use  Smoking status: Never Smoker  Smokeless tobacco: Never Used Substance Use Topics  Alcohol use: Yes  
  Comment: socially Family History Problem Relation Age of Onset  Sudden Death Sister   
     cause unknown  Lung Disease Mother  Hypertension Father Review of Systems Objective: No intake/output data recorded. No intake/output data recorded. Patient Vitals for the past 8 hrs: 
 BP Pulse Resp SpO2 Height Weight  
03/29/21 1225 (!) 91/59 (!) 47 18 100 %    
03/29/21 1215 (!) 78/55 (!) 47 16 100 %    
03/29/21 1200 (!) 84/54 (!) 55 19 100 %    
03/29/21 1142 (!) 66/50 60 16 100 % 5' 11\" (1.803 m) 73.6 kg (162 lb 4.1 oz) 03/29/21 1139   (!) 34 99 %   Physical Exam  
 
Labs:   
Recent Results (from the past 24 hour(s)) GLUCOSE, POC Collection Time: 03/29/21 11:29 AM  
Result Value Ref Range Glucose (POC) 226 (H) 65 - 100 mg/dL Performed by Carlos ARRIAGA   
EKG, 12 LEAD, INITIAL Collection Time: 03/29/21 11:31 AM  
Result Value Ref Range Ventricular Rate 59 BPM  
 Atrial Rate 60 BPM  
 QRS Duration 164 ms Q-T Interval 522 ms QTC Calculation (Bezet) 516 ms Calculated R Axis -84 degrees Calculated T Axis 121 degrees Diagnosis Wide QRS rhythm Left axis deviation Nonspecific intraventricular block When compared with ECG of 16-JAN-2009 12:05, 
Previous ECG has undetermined rhythm, needs review POC TROPONIN-I Collection Time: 03/29/21 11:32 AM  
Result Value Ref Range Troponin-I (POC) 0.40 (H) 0.00 - 0.08 ng/mL CBC WITH AUTOMATED DIFF Collection Time: 03/29/21 11:34 AM  
Result Value Ref Range WBC 8.4 4.1 - 11.1 K/uL  
 RBC 3.55 (L) 4.10 - 5.70 M/uL  
 HGB 11.0 (L) 12.1 - 17.0 g/dL HCT 35.3 (L) 36.6 - 50.3 % MCV 99.4 (H) 80.0 - 99.0 FL  
 MCH 31.0 26.0 - 34.0 PG  
 MCHC 31.2 30.0 - 36.5 g/dL  
 RDW 14.0 11.5 - 14.5 % PLATELET 359 099 - 334 K/uL MPV 11.5 8.9 - 12.9 FL  
 NRBC 0.0 0  WBC ABSOLUTE NRBC 0.00 0.00 - 0.01 K/uL NEUTROPHILS PENDING % LYMPHOCYTES PENDING %  MONOCYTES PENDING % EOSINOPHILS PENDING % BASOPHILS PENDING % IMMATURE GRANULOCYTES PENDING %  
 ABS. NEUTROPHILS PENDING K/UL  
 ABS. LYMPHOCYTES PENDING K/UL  
 ABS. MONOCYTES PENDING K/UL  
 ABS. EOSINOPHILS PENDING K/UL  
 ABS. BASOPHILS PENDING K/UL  
 ABS. IMM. GRANS. PENDING K/UL  
 DF PENDING   
METABOLIC PANEL, COMPREHENSIVE Collection Time: 03/29/21 11:34 AM  
Result Value Ref Range Sodium 142 136 - 145 mmol/L Potassium 4.0 3.5 - 5.1 mmol/L Chloride 112 (H) 97 - 108 mmol/L  
 CO2 17 (L) 21 - 32 mmol/L Anion gap 13 5 - 15 mmol/L Glucose 228 (H) 65 - 100 mg/dL BUN 26 (H) 6 - 20 MG/DL Creatinine 1.89 (H) 0.70 - 1.30 MG/DL  
 BUN/Creatinine ratio 14 12 - 20 GFR est AA 42 (L) >60 ml/min/1.73m2 GFR est non-AA 34 (L) >60 ml/min/1.73m2 Calcium 7.9 (L) 8.5 - 10.1 MG/DL Bilirubin, total 0.8 0.2 - 1.0 MG/DL  
 ALT (SGPT) 230 (H) 12 - 78 U/L  
 AST (SGOT) 255 (H) 15 - 37 U/L Alk. phosphatase 99 45 - 117 U/L Protein, total 5.6 (L) 6.4 - 8.2 g/dL Albumin 2.6 (L) 3.5 - 5.0 g/dL Globulin 3.0 2.0 - 4.0 g/dL A-G Ratio 0.9 (L) 1.1 - 2.2    
TROPONIN I Collection Time: 03/29/21 11:34 AM  
Result Value Ref Range Troponin-I, Qt. 1.46 (H) <0.05 ng/mL PROTHROMBIN TIME + INR Collection Time: 03/29/21 11:34 AM  
Result Value Ref Range INR 1.2 (H) 0.9 - 1.1 Prothrombin time 12.6 (H) 9.0 - 11.1 sec PTT Collection Time: 03/29/21 11:34 AM  
Result Value Ref Range aPTT 24.9 22.1 - 31.0 sec  
 aPTT, therapeutic range     58.0 - 77.0 SECS  
URINALYSIS W/ RFLX MICROSCOPIC Collection Time: 03/29/21 11:56 AM  
Result Value Ref Range Color YELLOW/STRAW Appearance CLEAR CLEAR Specific gravity 1.021 1.003 - 1.030    
 pH (UA) 5.0 5.0 - 8.0 Protein Negative NEG mg/dL Glucose Negative NEG mg/dL Ketone Negative NEG mg/dL Bilirubin Negative NEG Blood LARGE (A) NEG Urobilinogen 0.2 0.2 - 1.0 EU/dL  Nitrites Negative NEG    
 Leukocyte Esterase Negative NEG    
 
 
ECG: bradycardia Chest X-Ray: normal 
 
Assessment: 
Active Problems: 
  Cardiac arrest ([facility] [address] (3/29/2021) Plan: 1. Admit to ICU 2. Vaso pressors as needed for hypotension 3. Review CT scans 4. Discussion with the family about goals of care. Signed: 
Colt Valerio MD 3/29/2021

## 2021-03-29 NOTE — PROGRESS NOTES
Spiritual Care Assessment/Progress Note [address] 
 
 
NAME: Madelaine Zhang      MRN: [id number] AGE: 79 y.o. SEX: male Latter day Affiliation: No Rastafarian Language: English  
 
3/29/2021     Total Time (in minutes): 31 Spiritual Assessment begun in [facility] EMERGENCY DEPT through conversation with: 
  
    []Patient        [x] Family    [] Friend(s) Reason for Consult: Crisis Spiritual beliefs: (Please include comment if needed) [x] Identifies with a pauly tradition:    AGNOSTIC [] Supported by a pauly community:        
   [] Claims no spiritual orientation:       
   [] Seeking spiritual identity:            
   [] Adheres to an individual form of spirituality:       
   [] Not able to assess:                   
 
    
Identified resources for coping:  
   [] Prayer                           
   [] Music                  [] Guided Imagery 
   [] Family/friends                 [] Pet visits [] Devotional reading                         [] Unknown 
   [x] Other:  Recreational activities Interventions offered during this visit: (See comments for more details) Family/Friend(s): Affirmation of emotions/emotional suffering, Catharsis/review of pertinent events in supportive environment, Initial Assessment, Life review/legacy, Normalization of emotional/spiritual concerns, Reframing Plan of Care: 
 
 [] Support spiritual and/or cultural needs  
 [] Support AMD and/or advance care planning process    
 [] Support grieving process 
 [] Coordinate Rites and/or Rituals  
 [] Coordination with community clergy  [] No spiritual needs identified at this time 
 [] Detailed Plan of Care below (See Comments)  [] Make referral to Music Therapy 
[] Make referral to Pet Therapy    
[] Make referral to Addiction services 
[] Make referral to [facility] 
[] Make referral to Spiritual Care Partner 
[] No future visits requested       
[x] Follow up upon further referrals Comments: Responded to page regarding post cardiac arrest for pt [facility] in [place] [address] Family was present in waiting room. Provided pastoral presence as MD consulted with family and assisted spouse to bedside. Introduced  services as a manner of emotional support. Family articulated that they were Agnostic. Provided sources of hospitality. When pt required sterile procedure, family was moved to waiting room, which brought on emotional strain. Life review reveals biking as a coping mechanism for both and is tied to how they met. However, family expresses uncertainty and guilt within affect as she was ahead of him and did not know or notice that he was found down. Affirmed that her presence and concern was an act of love in hopes to reframe what lay out of her control. Family wished to be alone following life review and assurance that she would be able to see him following the procedure. Advised of  services. [address] [facility] Spiritual Care Provider  Paging Service [phone])
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
## 2021-03-29 NOTE — ED NOTES
NG tube inserted prior to CXR and connected to low intermitt sx. Brown liquid content noted in suction

## 2021-03-29 NOTE — PROGRESS NOTES
Patient seen again in ICU . He is on Ventilator . He is on Fentanyl sedation Nurse says he does have pupillary rxn and chewing on the ET tube . Nothing purposeful. Troponin 2. Certainly looks to be a primary arrhythmic event . Fits with his history Will review echo     Serial troponin . Will start him on AMiodarone . Long discussion with his wife ,who is a friend of my wife. Explained current status . Expected course of support and multisystem assessments over the next couple days ,  Main issue being what the CNS situation is going to be. Prognosis certainly very guarded, given his pre existing condition and age.
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
## 2021-03-29 NOTE — PROGRESS NOTES
1515- TRANSFER - IN REPORT: 
 
Verbal report received from [facility]) on Lucretia Godfrey  being received from ED~20(unit) for routine progression of care Report consisted of patients Situation, Background, Assessment and  
Recommendations(SBAR). Information from the following report(s) SBAR, Kardex, Intake/Output, Recent Results, Cardiac Rhythm NSR and Alarm Parameters  was reviewed with the receiving nurse. Opportunity for questions and clarification was provided. Assessment completed upon patients arrival to unit and care assumed. Primary Nurse Anise Snellen, RN and DAWIT Jolley RN performed a dual skin assessment on this patient No impairment noted Cristi score is 16 Admission assessment completed; see flow sheet for details. Pt minimally responsive; does not open eyes to stimuli; however will bite on ETT and become rigid with noxious stimuli; PERRLA. Cough/gag to suction. Currently in NSR; frequent PACs, BP stable on Levo and Epi gtt. Lungs are diminished throughout; Fio2~40%. ABD is semi-soft; slightly distended; BS hypoactive. Dunlap catheter in place; marginal UOP. Skin is cool; pale; hypothermic at this time; Hayes hugger placed on pt. Facial grimacing noted with turning and repositioning, will stack breaths periodically when stimulation and become tachypneic; will increased continuous Fentanyl gtt. 1530- Bedside ECHO being completed at the bedside. 80- Dr. Greg Hernandez at the bedside, made aware of marginal UOP. No new orders placed at this time; continue to monitor at this time. 56- Dr. Carley Kam at the bedside; see progress note for details. Verbal order for Amio gtt and bolus. Repeat Trop at 2200 
 
1820- Bedside and Verbal shift change report given to [address] [place] [address],[address] (oncoming nurse) by Mikki Schaffer RN (offgoing nurse). Report included the following information SBAR, Kardex, Recent Results, Cardiac Rhythm NSR, BBB and Alarm Parameters .

## 2021-03-29 NOTE — ROUTINE PROCESS
Patient is being transferred to \[facility]\"" Critical Care 1, Room # 21 [phone]. Report given to Toney Pozo RN on [facility] for routine progression of care. Report consisted of the following information SBAR. Patient transferred to receiving unit by: Margie Rudolph RN (RN or tech name). Outstanding consults needed: No  
 
Next labs due: Yes The following personal items will be sent with the patient during transfer to the floor: All valuables: 
 
Cardiac monitoring ordered: Yes The following CURRENT information was reported to the receiving RN: 
 
Code status: Full Code at time of transfer Last set of vital signs: 
Vital Signs Level of Consciousness: (!) Unresponsive (03/29/21 1330) Temp Source: Axillary (03/29/21 1142) Pulse (Heart Rate): 73 (03/29/21 1400) Heart Rate Source: Monitor (03/29/21 1400) Cardiac Rhythm: Normal sinus rhythm (03/29/21 1330) Resp Rate: 16 (03/29/21 1400) BP: 115/86 (03/29/21 1400) MAP (Monitor): 95 (03/29/21 1400) MAP (Calculated): 96 (03/29/21 1400) BP 1 Method: Automatic (03/29/21 1400) BP Patient Position: At rest (03/29/21 1400) Oxygen Therapy O2 Sat (%): 93 % (03/29/21 1330) Pulse via Oximetry: 72 beats per minute (03/29/21 1330) O2 Device: Endotracheal tube;Ventilator (03/29/21 1400) FIO2 (%): 70 % (03/29/21 1139) Last pain assessment: 
Pain 1 Pain Scale 1: Adult Nonverbal Pain Scale Patient Stated Pain Goal: Unable to verbalize/indicate pain Wounds: No  
 
Urinary catheter: weems Is there a weems order: Yes LDAs: 
    
Saline Lock 03/29/21 Right Antecubital (Active) Saline Lock 03/29/21 Right Forearm (Active) Nasogastric Tube 03/29/21 (Active) Airway - Endotracheal Tube 03/29/21 Oral (Active) Insertion Depth (cm) 22 cm 03/29/21 1139 Earth Teeth 03/29/21 1139 Side Secured Centered 03/29/21 1139 Site Assessment Intact 03/29/21 1139 Opportunity for questions and clarification was provided.  
 
Palmira Soler RN

## 2021-03-29 NOTE — ED NOTES
Pt taken to CT on monitor with RN and resp therapist along with portable vent. En route back to ER room pt vomited small amount of brown fluid. Oral suctioning performd.  HR was in 40's and dropped to 30's, ER provider called to room and order received. Epi infusion started as well.

## 2021-03-30 NOTE — PROGRESS NOTES
0730 Report received from night nurse, Ana Pereyra RN. Assumed care of patient. Heart rate found to be in 30's, Amio gtt turned off. Patient remains intubated and unresponsive. Partial code status. 0820 Per Jules, Fentanyl drip stopped.

## 2021-03-30 NOTE — PROGRESS NOTES
0115- Bedside report received from [facility], [address] [place] [address] (offgoing nurse). Pt is currently intubated, bradycardic & hypotensive. Left femoal quad lumen intact & in place; Epinephrine gtt infusing at 4mcg/min, levophed 20mcg, amiodarone at 0.5mg/min, fentanyl at 125mcg/hr and NS at 125ml/hr. Additional lines include PIVx2, weems catheter & NGT.  
 
0200- Assessment complete; see flowsheets for detials. End of Shift Note Bedside shift change report given to HUYEN Santillan (oncoming nurse) by Andrey Steve (offgoing nurse). Report included the following information SBAR, Kardex, Intake/Output, MAR, Cardiac Rhythm sinus tristian and Alarm Parameters Shift worked:  7p-7a Shift summary and any significant changes:  
 Patient became hypotensive & bradycardic Concerns for physician to address:  
  
Zone phone for oncoming shift:   [phone] Activity: 
Activity Level: Bed Rest 
Number times ambulated in hallways past shift: 0 Number of times OOB to chair past shift: 0 Cardiac:  
Cardiac Monitoring: Yes     
Cardiac Rhythm: Sinus bradycardia Access:  
Current line(s): central line Genitourinary:  
Urinary status: weems Respiratory:  
O2 Device: Endotracheal tube Chronic home O2 use?: NO Incentive spirometer at bedside: N/A 
  
GI: 
Last Bowel Movement Date: (PTA ) Current diet:  DIET NPO Passing flatus: NO Tolerating current diet: YES Pain Management:  
Patient states pain is manageable on current regimen: YES Skin: 
Cristi Score: 10 Interventions: turn team, float heels, PT/OT consult and internal/external urinary devices Patient Safety: 
Fall Score: Total Score: 3 Interventions: bed/chair alarm and assistive device (walker, cane, etc) High Fall Risk: Yes Length of Stay: 
Expected LOS: - - - Actual LOS: 1 Charls Handsome

## 2021-03-30 NOTE — PROGRESS NOTES
Physician Progress Note Mary Beth Meraz 
CSN #:                  [id number] :                       1940 ADMIT DATE:       3/29/2021 11:25 AM 
DISCH DATE: 
RESPONDING 
PROVIDER #:        HAZEL Patino TEXT: 
 
Dr Ryan Davila: 
Patient admitted following cardiac arrest.  If possible, please document in progress notes and discharge summary the cause of cardiac arrest: The medical record reflects the following: 
Risk Factors: presents for PEA arrest/cardiac arrest 
Clinical Indicators: cardiac noted office visit to F/U on Holter which showed multiple runs of NSVT . Patient has previously declined ICD and again at recent office visit declined ICD. This could well have been a primary arrhythmic Treatment: intubated, Epi gtt, Levophed gtt, Amio gtt. Options provided: 
-- Cardiac Arrest due to AMI 
-- Cardiac Arrest due to VFib 
-- Cardiac Arrest due to NSVT 
-- Cardiac Arrest due to Acute Respiratory Failure 
-- Other - I will add my own diagnosis -- Disagree - Not applicable / Not valid -- Disagree - Clinically unable to determine / Unknown 
-- Refer to Clinical Documentation Reviewer PROVIDER RESPONSE TEXT: 
 
This patient had cardiac arrest due to NSVT. Query created by: Ulysses Cid on 3/30/2021 1:14 PM 
 
 
QUERY TEXT: 
 
Dr Ryan Davila Pt admitted with cardiac arrest.  Pt noted to be intubated on arrival. If possible, please document in the progress notes and discharge summary if you are evaluating and/or treating any of the following: The medical record reflects the following: 
Risk Factors: presents with Cardiac arrest and resuscitation Clinical Indicators: respiratory rate 33-3;  ct scan show bilateral pleural effusions mild pulmonary edema and evidence of CHF, Treatment: maintain vent, Fentanyl sedation Options provided: 
-- Acute respiratory failure with hypoxia 
-- Acute respiratory failure with hypercapnia 
-- Other - I will add my own diagnosis -- Disagree - Not applicable / Not valid -- Disagree - Clinically unable to determine / Unknown 
-- Refer to Clinical Documentation Reviewer PROVIDER RESPONSE TEXT: 
 
This patient is in acute respiratory failure with hypoxia. Query created by:  Jasmyn Germain on 3/30/2021 1:26 PM 
 
 
Electronically signed by:  Roslyn Michelle 3/30/2021 1:58 PM

## 2021-03-30 NOTE — PROGRESS NOTES
Pharmacy Automatic Dosing Protocol - VTE prophylaxis Labs: 
Recent Labs  
  03/30/21 
0442 03/29/21 
2256 03/29/21 
1134 CREA 2.88* 2.07* 1.89* BUN 34* 32* 26* Creatinine Clearance (mL/min) or Dialysis: 21.4 mL/min (IBW) Weight: 73.6 kg Impression/Plan:  
Enoxaparin has been adjusted from 40 mg daily to  heparin 5000 units q8h for crcl <30 based on the VTE prophylaxis dosing protocol. Pharmacy will follow daily and adjust medications as appropriate. Thank you, JENNIFER Esqueda

## 2021-03-30 NOTE — PROGRESS NOTES
Progress Note 3/30/2021 8:30 AM 
NAME: Yady Jackson MRN:  [id number] Admit Diagnosis: Cardiac arrest ([facility] [address] [I46.9] Assessment:  
 
Out of hospital cardiac arrest and resuscitation     PEA initial , followed by V Fib and shock to rhythm and ROSC . Cardiogenic shock . On Levophed and EPI . Intubated, on ventilator. EKG shows sinus rhythm , NSSTT changes. No findings of STEMI .  
  
Recent office visit to f/u Holter which showed multiple runs of NSVT  . Patient has previously declined ICD and again at recent office visit declined ICD. This could well have been a primary arrhythmic arrest . Cannot exclude  Vein graft occlusion .  
  
Chronic EF 20 %.   
  
Cath in 2009 showed severe 3 vessel disease and he underwent CABG  X 4. LIMA to LAD . SVG to RCA and PDA. SVG to Marginal.  
 At that time his EF was 15% . 
  
S/P TAVR for Aortic Stenosis . 2017 Bilateral Carotid ASO Ischemic Cardiomyopathy . Chronic systolic heart failure. Hyperlipidemia. PAD with previous peripheral interventions. 3/30  Sinus bradycardia overnight and Amiodarone was stopped. He is requiring higher doses of EPI and LEVO to support BP Creatinine up . Lactic acid elevated. Troponin up to 50 . Echo showed EF 15% at best .   4 chamber dilated heart . I have discussed with his wife here. Usual cardiologist is Dr Laurelyn Sandifer . Plan:  
 
Poor prognosis . Continue support Agree to hold Amiodarone Concur with DNR as he would not survive and would not benefit from further resuscitation if / when his heart stops. Hyperkalemia - treatment per hospitalist.  
 
  
 
 [x]        High complexity decision making was performed Subjective:  
 
[address] on ventilator. Discussed with RN events overnight. Patient Active Problem List  
Diagnosis Code  Coronary artery disease involving native coronary artery of native heart without angina pectoris I25.10  
 S/P TAVR (transcatheter aortic valve replacement) Z95.2  Cardiac arrest ([facility] [address] I46.9 Review of Systems: 
 
Symptom Y/N Comments  Symptom Y/N Comments Fever/Chills N   Chest Pain N Poor Appetite N   Edema N   
Cough N   Abdominal Pain N Sputum N   Joint Pain N   
SOB/CALHOUN N   Pruritis/Rash N   
Nausea/vomit N   Tolerating PT/OT Y Diarrhea N   Tolerating Diet Y Constipation N   Other Could NOT obtain due to:   
 
Objective:  
  
Physical Exam: 
 
Last 24hrs VS reviewed since prior progress note. Most recent are: 
 
Visit Vitals /62 Pulse (!) 49 Temp 98.6 °F (37 °C) Resp 23 Ht 5' 11\" (1.803 m) Wt 73.6 kg (162 lb 4.1 oz) SpO2 100% BMI 22.63 kg/m² Intake/Output Summary (Last 24 hours) at 3/30/2021 0830 Last data filed at 3/30/2021 0700 Gross per 24 hour Intake 2756.19 ml Output 390 ml Net 2366.19 ml  
  
 
General Appearance: Well developed, well nourished, alert & oriented x 3,  
 no acute distress. Ears/Nose/Mouth/Throat: Hearing grossly normal. 
Neck: Supple. Chest: Lungs clear to auscultation bilaterally. Cardiovascular: Regular rate and rhythm, S1S2 normal, no murmur. Abdomen: Soft, non-tender, bowel sounds are active. Extremities: No edema bilaterally. Skin: Warm and dry. PMH/SH reviewed - no change compared to H&P Data Review Telemetry: normal sinus rhythm Lab Data Personally Reviewed: 
 
Recent Labs  
  03/30/21 
0442 03/29/21 
1134 WBC 15.8* 8.4 HGB 12.7 11.0*  
HCT 40.5 35.3*  
 177 LABRCNT(INR:3,PTP:3,APTT:3,) Recent Labs  
  03/30/21 
0442 03/29/21 
2256 03/29/21 
1134  140 142 K 6.3* 5.1 4.0  
* 114* 112* CO2 17* 18* 17* BUN 34* 32* 26* CREA 2.88* 2.07* 1.89* * 177* 228* CA 7.0* 7.5* 7.9*  
MG 2.2 1.9 2.1 LABRCNT(CPK:3,CpKMB:3,ckndx:3,troiq:3) Lab Results Component Value Date/Time  Cholesterol, total 131 05/17/2018 10:06 AM  
 HDL Cholesterol 40 05/17/2018 10:06 AM  
 LDL, calculated 64 05/17/2018 10:06 AM  
 Triglyceride 134 05/17/2018 10:06 AM  
 CHOL/HDL Ratio 3.8 04/15/2010 12:11 PM  
LABRCNT(sgot:3,gpt:3,ap:3,tbiL:3,TP:3,ALB:3,GLOB:3,ggt:3,aml:3,amyp:3,lpse:3,hlpse:3)No results for input(s): PH, PCO2, PO2 in the last 72 hours. Lab Results Component Value Date/Time Cholesterol, total 131 05/17/2018 10:06 AM  
 HDL Cholesterol 40 05/17/2018 10:06 AM  
 LDL, calculated 64 05/17/2018 10:06 AM  
 Triglyceride 134 05/17/2018 10:06 AM  
 CHOL/HDL Ratio 3.8 04/15/2010 12:11 PM  
MEDTABLERoque Osorio Ma, MD 
No results for input(s): PH, PCO2, PO2 in the last 72 hours. Medications Personally Reviewed: 
 
Current Facility-Administered Medications Medication Dose Route Frequency  fentaNYL citrate (PF) injection 100 mcg  100 mcg IntraVENous Q1H PRN  
 fentaNYL (PF) 1,500 mcg/30 mL (50 mcg/mL) infusion  0-200 mcg/hr IntraVENous TITRATE  
 NOREPINephrine (LEVOPHED) 8 mg in 5% dextrose 250mL (32 mcg/mL) infusion  0.5-30 mcg/min IntraVENous TITRATE  chlorhexidine (ORAL CARE KIT) 0.12 % mouthwash 15 mL  15 mL Oral Q12H  EPINEPHrine (ADRENALIN) 5 mg in 0.9% sodium chloride 250 mL infusion  1-20 mcg/min IntraVENous TITRATE  sodium chloride (NS) flush 5-40 mL  5-40 mL IntraVENous Q8H  
 sodium chloride (NS) flush 5-40 mL  5-40 mL IntraVENous PRN  
 acetaminophen (TYLENOL) tablet 650 mg  650 mg Oral Q6H PRN Or  
 acetaminophen (TYLENOL) suppository 650 mg  650 mg Rectal Q6H PRN  polyethylene glycol (MIRALAX) packet 17 g  17 g Oral DAILY PRN  promethazine (PHENERGAN) tablet 12.5 mg  12.5 mg Oral Q6H PRN Or  
 ondansetron (ZOFRAN) injection 4 mg  4 mg IntraVENous Q6H PRN  
 enoxaparin (LOVENOX) injection 40 mg  40 mg SubCUTAneous DAILY  0.9% sodium chloride infusion  125 mL/hr IntraVENous CONTINUOUS  
 potassium chloride 10 mEq in 100 ml IVPB  10 mEq IntraVENous PRN  
 magnesium sulfate 2 g/50 ml IVPB (premix or compounded)  2 g IntraVENous PRN  
 famotidine (PF) (PEPCID) 20 mg in 0.9% sodium chloride 10 mL injection  20 mg IntraVENous DAILY  alcohol 62% (NOZIN) nasal  1 Ampule  1 Ampule Topical Q12H  
 balsam peru-castor oiL (VENELEX) ointment   Topical BID  atropine 0.1 mg/mL injection Jacqueline Houser MD

## 2021-03-30 NOTE — FAMILY MEETING
I spoke to the patient's wife. She stated that she would not like to proceed with dialysis if the patient required it. K+ today was 6.3, will treat medically.

## 2021-03-30 NOTE — PROGRESS NOTES
03/30/21 8193 ABCDEF Bundle SBT Safety Screen Passed Yes SBT Trial Passed No  
SBT Trial Reason for Failure RSBI>105 (Apnea)

## 2021-03-30 NOTE — PROGRESS NOTES
Progress Note Date:3/30/2021       Room:[address] [place] [address] Patient Name:Kenn Escamilla     YOB: 1940     Age:80 y.o. Subjective Subjective Bradycardia overnight w/ amino gtt. Stopped this AM. Review of Systems Unable to perform ROS: Intubated Objective Vitals Last 24 Hours: TEMPERATURE:  Temp  Av.2 °F (36.8 °C)  Min: 94.4 °F (34.7 °C)  Max: 99.2 °F (37.3 °C) RESPIRATIONS RANGE: Resp  Av.9  Min: 0  Max: 34 PULSE OXIMETRY RANGE: SpO2  Av.3 %  Min: 93 %  Max: 100 % PULSE RANGE: Pulse  Av.6  Min: 30  Max: 75 BLOOD PRESSURE RANGE: Systolic (92OGQ), ZHT:766 , Min:66 , IGO:793  
; Diastolic (67QZL), YJS:89, Min:36, Max:86 I/O (24Hr): Intake/Output Summary (Last 24 hours) at 3/30/2021 3644 Last data filed at 3/30/2021 0700 Gross per 24 hour Intake 2756.19 ml Output 390 ml Net 2366.19 ml Objective: 
General Appearance:  Comfortable, well-appearing and in no acute distress. Vital signs: (most recent): Blood pressure 125/66, pulse (!) 58, temperature 99 °F (37.2 °C), resp. rate 17, height 5' 11\" (1.803 m), weight 73.6 kg (162 lb 4.1 oz), SpO2 100 %. Vital signs are normal.  (Bradycardia resolving). Output: Producing urine and no stool output. HEENT: Normal HEENT exam.   
Lungs:  Breath sounds clear to auscultation. No stridor. No rales, wheezes or rhonchi. Heart: Bradycardia. S1 normal and S2 normal.  No murmur, gallop or friction rub. Chest: Symmetric chest wall expansion. Abdomen: Abdomen is soft. Bowel sounds are normal.    
Pulses: Distal pulses are intact. Neurological: (Sedated on vent). Skin:  Warm and dry. Labs/Imaging/Diagnostics Labs: CBC: 
Recent Labs  
  21 
0442 21 
1134 WBC 15.8* 8.4  
RBC 4.07* 3.55* HGB 12.7 11.0*  
HCT 40.5 35.3*  
MCV 99.5* 99.4*  
RDW 14.4 14.0  
 177 CHEMISTRIES: 
Recent Labs  
  21 
0442 21 
2256 21 
1130  140 142 K 6.3* 5.1 4.0  
* 114* 112* CO2 17* 18* 17* BUN 34* 32* 26*  
CA 7.0* 7.5* 7.9*  
PHOS 7.3* 4.9*  --   
MG 2.2 1.9 2.1 PT/INR: 
Recent Labs  
  03/29/21 
1134 INR 1.2* APTT: 
Recent Labs  
  03/29/21 
1134 APTT 24.9 LIVER PROFILE: 
Recent Labs  
  03/29/21 
1134 * * Lab Results Component Value Date/Time ALT (SGPT) 230 (H) 03/29/2021 11:34 AM  
 AST (SGOT) 255 (H) 03/29/2021 11:34 AM  
 Alk. phosphatase 99 03/29/2021 11:34 AM  
 Bilirubin, direct 0.1 04/15/2009 11:40 AM  
 Bilirubin, total 0.8 03/29/2021 11:34 AM  
 
 
Imaging Last 24 Hours: 
Ct Head Wo Cont Result Date: 3/29/2021 EXAM: CT HEAD WO CONT INDICATION: ams COMPARISON: None. CONTRAST: None. TECHNIQUE: Unenhanced CT of the head was performed using 5 mm images. Brain and bone windows were generated. Coronal and sagittal reformats. CT dose reduction was achieved through use of a standardized protocol tailored for this examination and automatic exposure control for dose modulation. FINDINGS: Artifacts arising from bilateral hearing aids are shown. The ventricles and sulci are normal in size, shape and configuration. . There is no significant white matter disease. There is no intracranial hemorrhage, extra-axial collection, or mass effect. The basilar cisterns are open. No CT evidence of acute infarct. The bone windows demonstrate no abnormalities. The visualized portions of the paranasal sinuses and mastoid air cells are clear. Transnasal and transorbital tubes are shown. No acute findings. Ct Chest W Cont Result Date: 3/29/2021 INDICATION: trauma COMPARISON: Earlier chest x-ray TECHNIQUE:  Following the uneventful intravenous administration of 100 cc Isovue-300, 5 mm axial images were obtained through the chest, abdomen and pelvis. Coronal and sagittal reformats were generated.   CT dose reduction was achieved through use of a standardized protocol tailored for this examination and automatic exposure control for dose modulation. FINDINGS: CHEST WALL: No mass or axillary lymphadenopathy. THYROID: No nodule. MEDIASTINUM: No mediastinal mass or adenopathy. TESSA: No mass or lymphadenopathy. THORACIC AORTA: Ectasia of ascending aorta measuring up to 4.6 cm transverse. Diameter of mid transverse aorta is 2.7 cm. Diameter of proximal descending aorta is 2.9 cm and mid and distal thoracic aorta 2.4 cm. MAIN PULMONARY ARTERY: Normal in caliber. TRACHEA/BRONCHI: Patent. Endotracheal tube terminates at level of sternoclavicular joints. ESOPHAGUS: No wall thickening or dilatation. . Esophageal tube extends into stomach. HEART: Moderate cardiac contour enlargement. No pericardial effusion. Patient status post median sternotomy. PLEURA: Trace bilateral pleural effusions. LUNGS: Ill-defined diffuse patchy pulmonary groundglass opacities in bilateral basilar dependent subsegmental atelectasis. Liver: No hepatic mass or intrahepatic bile duct dilation. Reflux of contrast into IVC and hepatic veins consistent with congestive heart failure. Gallbladder: Small dependent gallstones. No extrahepatic bile duct dilation. Pancreas: Normal. Spleen: Normal. Adrenal glands and kidneys are unremarkable. Retroperitoneum: Mildly abundant atherosclerotic calcifications. No aneurysm. No retroperitoneal mass or adenopathy demonstrated. Peritoneum: Small amounts of perihepatic and perisplenic peritoneal fluid. No free peritoneal air. Urinary bladder: Dunlap catheter within the urinary bladder. Bowel: Mild gastric distention. Otherwise no bowel distention or mural thickening. . Abdominal wall: Small left inguinal hernia containing fat. BONES: No destructive bone lesion. Acute fracture anterior left fourth rib. 1. Trace bilateral pleural effusions. Trace free peritoneal fluid in perihepatic perisplenic spaces. 2. Endotracheal tube and transesophageal tube.  3. Moderate cardiac contour enlargement with congestive heart failure evidenced by reflux of contrast into the IVC and hepatic veins. 4. Mild pulmonary edema. 5. Small gallstones. 6. Dunlap catheter within urinary bladder. 7. Small left inguinal hernia containing fat. 8. Acute fracture anterior left fourth rib. Ct Spine Cerv Wo Cont Result Date: 3/29/2021 EXAM:  CT CERVICAL SPINE WITHOUT CONTRAST INDICATION: trauma. COMPARISON: None. CONTRAST:  None. TECHNIQUE: Multislice helical CT of the cervical spine was performed without intravenous contrast administration. Sagittal and coronal reformats were generated. CT dose reduction was achieved through use of a standardized protocol tailored for this examination and automatic exposure control for dose modulation. FINDINGS: Endotracheal tube and transesophageal tube are present. There is mild diffuse osteopenia. Vertebral body heights are normal. There is anatomic alignment. There is severe degenerative narrowing of the anterior atlantodental interval. There is mild C2-3, moderate C3-4, mild C4-5, moderate C5-6 and severe C6-7 degenerative disc space narrowing. Posterior processes are intact. There is facet osteoarthrosis present on the left at C3-4, C4-5 and C5-6, and present on the right at C7-T1. No substantial osseous canal stenosis is shown. Mild foraminal narrowing is present on the left at C5-6. No paraspinal soft tissue mass-swelling is demonstrated. Osteopenia and degenerative thighs. No acute fracture or dislocation demonstrated. Ct Abd Pelv W Cont Result Date: 3/29/2021 INDICATION: trauma COMPARISON: Earlier chest x-ray TECHNIQUE:  Following the uneventful intravenous administration of 100 cc Isovue-300, 5 mm axial images were obtained through the chest, abdomen and pelvis. Coronal and sagittal reformats were generated. CT dose reduction was achieved through use of a standardized protocol tailored for this examination and automatic exposure control for dose modulation.  FINDINGS: CHEST WALL: No mass or axillary lymphadenopathy. THYROID: No nodule. MEDIASTINUM: No mediastinal mass or adenopathy. TESSA: No mass or lymphadenopathy. THORACIC AORTA: Ectasia of ascending aorta measuring up to 4.6 cm transverse. Diameter of mid transverse aorta is 2.7 cm. Diameter of proximal descending aorta is 2.9 cm and mid and distal thoracic aorta 2.4 cm. MAIN PULMONARY ARTERY: Normal in caliber. TRACHEA/BRONCHI: Patent. Endotracheal tube terminates at level of sternoclavicular joints. ESOPHAGUS: No wall thickening or dilatation. . Esophageal tube extends into stomach. HEART: Moderate cardiac contour enlargement. No pericardial effusion. Patient status post median sternotomy. PLEURA: Trace bilateral pleural effusions. LUNGS: Ill-defined diffuse patchy pulmonary groundglass opacities in bilateral basilar dependent subsegmental atelectasis. Liver: No hepatic mass or intrahepatic bile duct dilation. Reflux of contrast into IVC and hepatic veins consistent with congestive heart failure. Gallbladder: Small dependent gallstones. No extrahepatic bile duct dilation. Pancreas: Normal. Spleen: Normal. Adrenal glands and kidneys are unremarkable. Retroperitoneum: Mildly abundant atherosclerotic calcifications. No aneurysm. No retroperitoneal mass or adenopathy demonstrated. Peritoneum: Small amounts of perihepatic and perisplenic peritoneal fluid. No free peritoneal air. Urinary bladder: Dunlap catheter within the urinary bladder. Bowel: Mild gastric distention. Otherwise no bowel distention or mural thickening. . Abdominal wall: Small left inguinal hernia containing fat. BONES: No destructive bone lesion. Acute fracture anterior left fourth rib. 1. Trace bilateral pleural effusions. Trace free peritoneal fluid in perihepatic perisplenic spaces. 2. Endotracheal tube and transesophageal tube. 3. Moderate cardiac contour enlargement with congestive heart failure evidenced by reflux of contrast into the IVC and hepatic veins.  4. Mild pulmonary edema. 5. Small gallstones. 6. Dunlap catheter within urinary bladder. 7. Small left inguinal hernia containing fat. 8. Acute fracture anterior left fourth rib. Xr Chest [facility] Result Date: 3/29/2021 Indication: Chest pain, ET tube placement Comparison: 3/23/2017 Portable exam of the chest obtained at 1134 demonstrates normal heart size. The patient is status post CABG procedure and valve replacement. ET tube has been placed with the tip projecting at the thoracic inlet. NG tube has been placed but the tip is not visualized. There is no acute process in the lung fields. The osseous structures are unremarkable. No acute process. ET tube and NG tube as above. Echo Adult Complete Result Date: 3/29/2021 · LV: Estimated LVEF is 15 - 20%. Visually measured ejection fraction. Moderately dilated left ventricle. Increased wall thickness. Severely reduced systolic function. · LA: Mildly dilated left atrium. · RV: Mildly dilated right ventricle. Mildly reduced systolic function. · RA: Mildly dilated right atrium. · AV: Prosthetic aortic valve. Aortic valve area is 1.2 cm2. 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 Mild aortic valve regurgitation is present. · MV: Mitral valve non-specific thickening. Moderate to severe mitral valve regurgitation is present. · TV: Mild tricuspid valve regurgitation is present. · PA: Mild pulmonary hypertension. Pulmonary arterial systolic pressure is 39 mmHg. · IVC: Mildly elevated central venous pressure (8 mmHg); IVC diameter is less than 21 mm and collapses less than 50% with respiration. · IAS: No atrial septal defect present. Assessment//Plan Active Problems: 
  Cardiac arrest ([address] (3/29/2021) Assessment: 
 
Condition: In stable condition. Unchanged. Plan:  
Consults: cardiology. Diet Plan: start TF. Administer medications as ordered. (1. Will hold fentanyl to check neuro status 2  Agree DNR, poor prognosis 30 mins of critical care time excluding billable procedures ).   
 
 
Electronically signed by Sav Ramirez MD on 3/30/2021 at 8:47 AM

## 2021-03-30 NOTE — PROGRESS NOTES
Pt is an 80 yo male admitted for out of hospital cardiac arrest. Intubated. Family has agreed to DNR status and do not want HD. Prognosis is poor. CM remains avbl to assist w/ planning as appropriate. Zack Rhoades, CIPRIANO

## 2021-03-30 NOTE — PROGRESS NOTES
Comprehensive Nutrition Assessment Type and Reason for Visit: Initial, Consult Nutrition Recommendations/Plan:  
Initiate TF via NGT: 
 Start Nepro @ 10ml/h, advance rate 10mL q 8h as tolerated to Goal Rate of 40mL/h + 50mL flush q 6h (provides 1728kcals/78gPro/154gCHO/898mL) Pt at HIGH risk for GRICEL, please hold for MAP's consistently <60, increasing abdominal pain/distension Nutrition Assessment:   Pt admitted with cardiac arrest.  PMH: CAD, CHF (EF 15%), HTN. Chart reviewed, case discussed during CCU rounds. Pt is intubated and unresponsive. Poor prognosis given prolonged down time. No plans to escalate or de-escalate care at this point. Did not enter the room to discuss nutrition hx with family given the fragile situation. Per discussion with Dr. Sonu august to start TF today. Pt is in renal failure, K 6.3 and phos 7.3 (lokelma given) no plans for RRT per family request.  Epi at 25 (being weaned) and levo at 21 with MAP's in the [de-identified] the past few hours. Discussed signs/sx of GRICEL for RN to watch out for and hold TF prn. Estimated Daily Nutrient Needs: 
Energy (kcal): PSU 6000 (MSJ 1172); Weight Used for Energy Requirements: Current Protein (g): 74-88g (1-1.2gPro/kg); Weight Used for Protein Requirements: Current Fluid (ml/day): 1200mL or per MD; Method Used for Fluid Requirements: Other (comment) Nutrition Related Findings:  Meds: [email or web address]; Drips: epi, levo. BM 3/29 Wounds:   
None Current Nutrition Therapies: DIET NPO 
DIET TUBE FEEDING Please hold for MAP's consistently <60 Anthropometric Measures: 
· Height:  5' 11\" (180.3 cm) · Current Body Wt:  73.6 kg (162 lb 4.1 oz) · Ideal Body Wt:  172 lbs:  94.3 % · BMI Category:  Normal weight (BMI 22.0-24.9) age over 72 Nutrition Diagnosis:  
· Inadequate protein-energy intake related to impaired respiratory function as evidenced by NPO or clear liquid status due to medical condition Nutrition Interventions:  
Food and/or Nutrient Delivery: Start tube feeding Nutrition Education and Counseling: No recommendations at this time Coordination of Nutrition Care: Continue to monitor while inpatient, Interdisciplinary rounds Goals: Pt will tolerate TF initiation with residuals <500mL, no signs/sx of GRICEL and K WNL in 2-4 days. Nutrition Monitoring and Evaluation:  
Behavioral-Environmental Outcomes: None identified Food/Nutrient Intake Outcomes: Enteral nutrition intake/tolerance Physical Signs/Symptoms Outcomes: Biochemical data, Nutrition focused physical findings, Skin, Weight, Fluid status or edema, Hemodynamic status Discharge Planning: Too soon to determine Electronically signed by Hafsa Arnett RD, [address]  on 3/30/2021 at 1:23 PM 
 
Contact: [phone]

## 2021-03-30 NOTE — PROGRESS NOTES
Patient became bradycardic to the 20's and hypotensive during shift. EKG shows sinus bradycardia. Will continue to escalate pressors as patient requires. Patient was made partial code by family. No intervention for bradycardia at this time. Above actions discussed and reviewed with shift NP Greg Christian. Will continue to monitor patient. Signed By: Luisa Keith RN   
 March 30, 2021

## 2021-03-31 PROBLEM — R55 CONVULSIVE SYNCOPE: Status: ACTIVE | Noted: 2021-01-01

## 2021-03-31 PROBLEM — R41.82 ACUTE ALTERATION IN MENTAL STATUS: Status: ACTIVE | Noted: 2021-01-01

## 2021-03-31 PROBLEM — I67.82 SEVERE ANOXIC-ISCHEMIC ENCEPHALOPATHY (HCC): Status: ACTIVE | Noted: 2021-01-01

## 2021-03-31 PROBLEM — G93.1 SEVERE ANOXIC-ISCHEMIC ENCEPHALOPATHY (HCC): Status: ACTIVE | Noted: 2021-01-01

## 2021-03-31 NOTE — PROGRESS NOTES
1915: Shift report received from Richard Cole 1772: Shift assessment complete. No command following, patient currently intubated. Epi/Levo and NS running through L femoral line. 2250 patient received one time order of Versed, due to biting down on ETT. Bite block placed by RT 
 
0000: Reassessment complete, no changes noted. 0345: PRN fentanyl given due to patient biting down on tube  
 
0400:Reassessment complete, TF rate increased to 20ml/hr Labs drawn, Co2 13 Venous blood gas drawn, PH 7.19. one amp of Bicarb ordered. 0600: Levo stopped Report given to [facility] RN

## 2021-03-31 NOTE — PROGRESS NOTES
0730 Report received from night nurse, RN. Assumed care of patient. 0800 Assessment as documented. Remains on Epi drip, will wean as tolerated. 0820 Temp 103.3 orally, [place] notified, Tylenol given via NG. End of Shift Note Bedside shift change report given to Faby Tan RN by Nena Olson RN (offgoing nurse). Report included the following information SBAR, Kardex, Intake/Output and Cardiac Rhythm NSR with PVC's (multifocal) Shift worked:  7a-7p Shift summary and any significant changes:  
 Head CT completed, weaning Epi drip, TMAX 103.3, ?aspiration, vomited tube feedings at the end of the shift Concerns for physician to address: Support family Zone phone for oncoming shift:    
 
Activity: 
Activity Level: Bed Rest 
Number times ambulated in hallways past shift: 0 Number of times OOB to chair past shift: 0 Cardiac:  
Cardiac Monitoring: Yes     
Cardiac Rhythm: (P) Normal sinus rhythm Access:  
Current line(s): central line Genitourinary:  
Urinary status: weems Respiratory:  
O2 Device: (P) Ventilator Chronic home O2 use?: NO Incentive spirometer at bedside: NO 
  
GI: 
Last Bowel Movement Date: (PTA ) Current diet:  DIET NPO 
DIET TUBE FEEDING Please hold for MAP's consistently <60 Passing flatus: NO Tolerating current diet: NO 
  
 
Pain Management:  
Patient states pain is manageable on current regimen: N/A Skin: 
Cristi Score: 10 Interventions: float heels, internal/external urinary devices and nutritional support Patient Safety: 
Fall Score: Total Score: 4 Interventions: bed/chair alarm High Fall Risk: Yes Length of Stay: 
Expected LOS: 3d 12h Actual LOS: 2 Nena Olson RN

## 2021-03-31 NOTE — PROGRESS NOTES
Seen this afternoon . CT scan noted . No changes. EEG is pending . Hemodynamics unchanged. No urine output. Reviewed status with his wife.

## 2021-03-31 NOTE — CONSULTS
Consult REFERRED BY: 
Krystyna Ordonez MD 
 
CHIEF COMPLAINT: Cardiac arrest and anoxic brain injury Subjective:  
 
Edgar Palma is a 79 y.o. right-handed  male we are seeing at the request of the intensivist to evaluate patient's neurologic status after cardiac arrest and prolonged resuscitation prognosis and chances of recovery. Patient had PEA, and was coded in the field and went into V. fib and his estimated time down was 30 to 60 minutes. His wife states that CPR began in 1 to 2 minutes after his collapse. He has a known history of recurrent V. fib for which he refused a defibrillator. Patient had a normal CT scan of the head on admission is going down for repeat today. Is going to get an EEG later today also. Has had no seizure activity and remains severely obtunded and semicomatose. Patient apparently does cough when suctioned and occasionally opens eyes, does not focus and does not do much with his extremities. Is never had a clear history of stroke in the past.  She has history of mild bilateral carotid stenosis. Her past history of significant ischemic heart disease and cardiomyopathy and recurrent V. fib. He also has hypertension and previous MI, and has had a aortic valve replaced and CABG and peripheral arterial disease status post bilateral popliteal stenting his medications include Effient Lasix Prinivil Coreg aspirin Lipitor co-Q10 and Zetia. Past Medical History:  
Diagnosis Date  Aortic stenosis  CAD (coronary artery disease) 1989 MI  
 Carotid stenosis   
 mild bilateral  
 Heart failure ([address] ischemic cardiomyopathy  Hypertension Past Surgical History:  
Procedure Laterality Date  HX HERNIA REPAIR  2012  RI CABG, ARTERY-VEIN, FOUR  2009  RI CARDIAC SURG PROCEDURE UNLIST  06/30/2017 TAVR  VASCULAR SURGERY PROCEDURE UNLIST  2014  
 bilat popliteal stenting Family History Problem Relation Age of Onset  Sudden Death Sister   
     cause unknown  Lung Disease Mother  Hypertension Father Social History Tobacco Use  Smoking status: Never Smoker  Smokeless tobacco: Never Used Substance Use Topics  Alcohol use: Yes Comment: socially Current Facility-Administered Medications:  
  acetaminophen (TYLENOL) solution 650 mg, 650 mg, Per G Tube, Q4H PRN, Rita Joyce MD 
  [COMPLETED] piperacillin-tazobactam (ZOSYN) 3.375 g in 0.9% sodium chloride (MBP/ADV) 100 mL MBP, 3.375 g, IntraVENous, ONCE, Last Rate: 200 mL/hr at 03/31/21 1006, 3.375 g at 03/31/21 1006 **FOLLOWED BY** piperacillin-tazobactam (ZOSYN) 3.375 g in 0.9% sodium chloride (MBP/ADV) 100 mL MBP, 3.375 g, IntraVENous, Q12H, Kris Wray DO 
  heparin (porcine) injection 5,000 Units, 5,000 Units, SubCUTAneous, Q8H, Kris Villain, DO, 5,000 Units at 03/31/21 1402   sodium zirconium cyclosilicate (LOKELMA) powder packet 10 g, 10 g, Oral, Q8H, Ashok Vicente, DO, 10 g at 03/31/21 1146 
  fentaNYL citrate (PF) injection 100 mcg, 100 mcg, IntraVENous, Q1H PRN, Sanjay Aj MD, 100 mcg at 03/31/21 3554   fentaNYL (PF) 1,500 mcg/30 mL (50 mcg/mL) infusion, 0-200 mcg/hr, IntraVENous, TITRATE, Sanjay Aj MD, Stopped at 03/30/21 3409   NOREPINephrine (LEVOPHED) 8 mg in 5% dextrose 250mL (32 mcg/mL) infusion, 0.5-30 mcg/min, IntraVENous, TITRATE, Sanjay Aj MD, Stopped at 03/31/21 0600   chlorhexidine (ORAL CARE KIT) 0.12 % mouthwash 15 mL, 15 mL, Oral, Q12H, Sanjay Aj MD, 15 mL at 03/31/21 9071   EPINEPHrine (ADRENALIN) 5 mg in 0.9% sodium chloride 250 mL infusion, 1-20 mcg/min, IntraVENous, TITRATE, Jamie Davis NP, Last Rate: 54 mL/hr at 03/31/21 1410, 18 mcg/min at 03/31/21 1410 
  sodium chloride (NS) flush 5-40 mL, 5-40 mL, IntraVENous, Q8H, Ashok Vicente, DO, 10 mL at 03/31/21 1403   sodium chloride (NS) flush 5-40 mL, 5-40 mL, IntraVENous, PRN, Johnella Reynolds, DO 
  [DISCONTINUED] acetaminophen (TYLENOL) tablet 650 mg, 650 mg, Oral, Q6H PRN, 650 mg at 03/31/21 0837 **OR** acetaminophen (TYLENOL) suppository 650 mg, 650 mg, Rectal, Q6H PRN, Johnella Reynolds, DO 
  polyethylene glycol (MIRALAX) packet 17 g, 17 g, Oral, DAILY PRN, Johnella Reynolds, DO 
  promethazine (PHENERGAN) tablet 12.5 mg, 12.5 mg, Oral, Q6H PRN **OR** ondansetron (ZOFRAN) injection 4 mg, 4 mg, IntraVENous, Q6H PRN, Johnella Reynolds, DO, 4 mg at 03/29/21 1329 
  0.9% sodium chloride infusion, 125 mL/hr, IntraVENous, CONTINUOUS, Johnella Reynolds, DO, Last Rate: 125 mL/hr at 03/31/21 0928, 125 mL/hr at 03/31/21 8065   potassium chloride 10 mEq in 100 ml IVPB, 10 mEq, IntraVENous, PRN, Johnella Reynolds, DO 
  magnesium sulfate 2 g/50 ml IVPB (premix or compounded), 2 g, IntraVENous, PRN, Ashok Turner DO 
  famotidine (PF) (PEPCID) 20 mg in 0.9% sodium chloride 10 mL injection, 20 mg, IntraVENous, DAILY, Dwayne Fleming DO, 20 mg at 03/31/21 0837 
  alcohol 62% (NOZIN) nasal  1 Ampule, 1 Ampule, Topical, Q12H, Johnella Reynolds, DO, 1 Ampule at 03/31/21 7966 
  balsam peru-castor oiL (VENELEX) ointment, , Topical, BID, Johnella Reynolds, DO, Given at 03/31/21 8080 Allergies Allergen Reactions  Niacin Other (comments) Severe flushing MRI Results (most recent): 
Results from Hospital Encounter encounter on 03/21/19 MRI SHOULDER LT WO CONT Narrative EXAM: MRI SHOULDER LT WO CONT INDICATION: Rotator cuff arthropathy left shoulder. Left shoulder pain. No 
trauma. Pain with overhead activity COMPARISON: None TECHNIQUE: Axial proton density fat-saturated; oblique coronal T1, T2 
fat-saturated, and proton density fat-saturated; and oblique sagittal T2 
fat-saturated MRI of the left shoulder . CONTRAST: None.  
 
FINDINGS: A.C. joint: There is mild degeneration of the acromioclavicular joint 
with remodeling of the undersurface of the acromion Anterior acromion process 
type: 2 Bone marrow: Humeral head is high riding with flattening of the greater 
tuberosity No acute fracture, dislocation, or marrow replacing process. Joint fluid: None. Rotator cuff tendons: There is a massive full-thickness tear of the 
supraspinatus and infraspinatus. The torn fibers have retracted to the glenoid. Subscapularis and teres minor are intact Biceps tendon: Chronically torn and retracted Muscles: There is severe atrophy of the supraspinatus and infraspinatus with 
mild edema of the infraspinatus Glenoid labrum: Superior labrum is degenerated Glenohumeral joint capsule: within normal limits. Glenohumeral articular cartilage: There is thinning of the cartilage of the 
superior humeral head Soft tissue mass: None. Impression IMPRESSION:  
1. Massive full-thickness tears of the supraspinatus and infraspinatus. Torn 
fibers have retracted to the glenoid with severe atrophy and mild edema of the 
infraspinatus 2. Chronic biceps long head tendon tear with distal retraction 3. Mild degeneration of the glenohumeral joint with high riding humeral head 
abutting the undersurface of the acromion Results from [facility] Encounter encounter on 03/21/19 MRI SHOULDER LT WO CONT Narrative EXAM: MRI SHOULDER LT WO CONT INDICATION: Rotator cuff arthropathy left shoulder. Left shoulder pain. No 
trauma. Pain with overhead activity COMPARISON: None TECHNIQUE: Axial proton density fat-saturated; oblique coronal T1, T2 
fat-saturated, and proton density fat-saturated; and oblique sagittal T2 
fat-saturated MRI of the left shoulder . CONTRAST: None. FINDINGS: A.C. joint: There is mild degeneration of the acromioclavicular joint 
with remodeling of the undersurface of the acromion Anterior acromion process 
type: 2 Bone marrow: Humeral head is high riding with flattening of the greater 
tuberosity No acute fracture, dislocation, or marrow replacing process. Joint fluid: None. Rotator cuff tendons: There is a massive full-thickness tear of the 
supraspinatus and infraspinatus. The torn fibers have retracted to the glenoid. Subscapularis and teres minor are intact Biceps tendon: Chronically torn and retracted Muscles: There is severe atrophy of the supraspinatus and infraspinatus with 
mild edema of the infraspinatus Glenoid labrum: Superior labrum is degenerated Glenohumeral joint capsule: within normal limits. Glenohumeral articular cartilage: There is thinning of the cartilage of the 
superior humeral head Soft tissue mass: None. Impression IMPRESSION:  
1. Massive full-thickness tears of the supraspinatus and infraspinatus. Torn 
fibers have retracted to the glenoid with severe atrophy and mild edema of the 
infraspinatus 2. Chronic biceps long head tendon tear with distal retraction 3. Mild degeneration of the glenohumeral joint with high riding humeral head 
abutting the undersurface of the acromion Review of Systems: 
Review of systems not obtained due to patient factors. Vitals:  
 03/31/21 1130 03/31/21 1200 03/31/21 1300 03/31/21 1330 BP: (!) 98/57 107/67 (!) 82/50 96/63 Pulse: 85 87 81 80 Resp: 18 17 22 19 Temp:  99.8 °F (37.7 °C) SpO2: 96% 98% 96% 95% Weight:      
Height:      
 
Objective: I 
 
 
NEUROLOGICAL EXAM: 
 
Appearance: The patient is well developed, well nourished, intubated and not sedated. Mental Status:  Intubated and not sedated Cranial Nerves:    Visual fields are not testable, fundi are poorly seen, oculocephalics are suppressed but present, corneals are suppressed but present, pupils very miotic but seemingly minimally reactive light, face is symmetric, endotracheal tube in place and hearing is not testable and patient is nonverbal, Neck without meningismus or bruits Temporal arteries are not tender or enlarged TMJ areas are not tender on palpation Motor:   Patient with minimal response to deep pain in all extremities Reflexes:   Deep tendon reflexes 1+/4 and symmetrical. 
No babinski or clonus present Sensory:    Responds only to deep pain or section Gait:  Not testable gait Tremor:   No tremor noted. Cerebellar:  Not testable cerebellar signs present on Romberg and tandem testing and finger-nose-finger exam.  
Neurovascular:  Normal heart sounds and regular rhythm, peripheral pulses decreased, and no carotid bruits. Assessment: ICD-10-CM ICD-9-CM 1. Cardiac arrest ([facility])  I46.9 427.5 Active Problems: 
  Cardiac arrest ([facility] [address] (3/29/2021) Severe anoxic-ischemic encephalopathy ([facility] [address] (3/31/2021) Convulsive syncope (3/31/2021) Acute alteration in mental status (3/31/2021) Plan:  
 
Patient with prolonged cardiac resuscitation and downtime 30 to 60 minutes by the intensivist note Patient appears to have severe anoxic brain injury, but clearly has some function in all of his brainstem function it looks like, and is on suctioning and painful stimuli he does have some movement. Explained to the wife that the patient has probable moderately severe anoxic brain injury, and will probably be severely injured, with a level of his disability cannot be accurately predicted so far we will just have to follow him for his clinical findings with his test results with clinical correlation. 1 hour spent reviewing patient's history, going over all his records, reviewing his CT scan personally on his PACS system, discussing with his wife and nursing staff, and advised her it will take a while before we can more accurately predict level recovery may become. May need to consider referral somebody CODE STATUS because he already has severe disability related to this and anoxic brain injury. We will follow carefully with you. Signed By: Berenice Watkins MD   
 March 31, 2021 CC: Greta Drake MD 
FAX: [phone]

## 2021-03-31 NOTE — PROGRESS NOTES
Pharmacy Automatic Renal Dosing Protocol - Antimicrobials Indication for Antimicrobials: aspiration PNA Current Regimen of Each Antimicrobial:  
Piperacillin/tazobactam 3.375g IV q12h, start 3/31; day 1 Previous Antimicrobial Therapy: N/a Significant Cultures:  
none Paralysis, amputations, malnutrition: none Labs: 
Recent Labs  
  21 
0404 21 97 [id number] 21 
1730 21 
0442 21 
1134 21 
1134 CREA 3.05* 3.04* 3.00* 2.88*   < > 1.89* BUN 41* 39* 37* 34*   < > 26* WBC 15.6*  --   --  15.8*  --  8.4  
 < > = values in this interval not displayed. Temp (24hrs), Av.8 °F (37.7 °C), Min:97.4 °F (36.3 °C), Max:103.3 °F (39.6 °C) Creatinine Clearance (mL/min) or Dialysis: 19.8 mL/min (actual weight) Impression/Plan:  
Zosyn q12 for renal function Antimicrobial stop date pending Pharmacy will follow daily and adjust medications as appropriate for renal function and/or serum levels. Thank you, JENNIFER Aldrich

## 2021-03-31 NOTE — PROGRESS NOTES
Progress Note Date:3/31/2021       Room:[address] [place] [address] Patient Name:Kenn Bishop     YOB: 1940     Age:80 y.o. Subjective Subjective No acute overnight events. Review of Systems Unable to perform ROS: Intubated Objective Vitals Last 24 Hours: TEMPERATURE:  Temp  Av.5 °F (37.5 °C)  Min: 97.4 °F (36.3 °C)  Max: 103.3 °F (39.6 °C) RESPIRATIONS RANGE: Resp  Av.4  Min: 15  Max: 30 
PULSE OXIMETRY RANGE: SpO2  Av.4 %  Min: 91 %  Max: 100 % PULSE RANGE: Pulse  Av  Min: 65  Max: 92 
BLOOD PRESSURE RANGE: Systolic (57FCE), PVQ:342 , Min:81 , WNI:590  
; Diastolic (76XJQ), UYI:59, Min:51, Max:80 I/O (24Hr): Intake/Output Summary (Last 24 hours) at 3/31/2021 1137 Last data filed at 3/31/2021 0900 Gross per 24 hour Intake 4673.22 ml Output 685 ml Net 3988.22 ml Objective: 
General Appearance:  Comfortable, well-appearing and in no acute distress. Vital signs: (most recent): Blood pressure (!) 89/53, pulse 86, temperature (!) 103.3 °F (39.6 °C), resp. rate 30, height 5' 11\" (1.803 m), weight 73.6 kg (162 lb 4.1 oz), SpO2 96 %. Vital signs are normal.  (Bradycardia resolving). Output: Minimal urine output and no stool output. HEENT: Normal HEENT exam.   
Lungs:  Breath sounds clear to auscultation. No stridor. No rales, wheezes or rhonchi. Heart: Bradycardia. S1 normal and S2 normal.  No murmur, gallop or friction rub. Chest: Symmetric chest wall expansion. Abdomen: Abdomen is soft. Bowel sounds are normal.    
Pulses: Distal pulses are intact. Neurological: (Minimal eye opening with sternal rub). Skin:  Warm and dry. Labs/Imaging/Diagnostics Labs: CBC: 
Recent Labs  
  21 
0404 21 
0442 21 
1134 WBC 15.6* 15.8* 8.4  
RBC 3.82* 4.07* 3.55* HGB 11.8* 12.7 11.0*  
HCT 38.1 40.5 35.3*  
MCV 99.7* 99.5* 99.4*  
RDW 14.1 14.4 14.0  
* 198 177 CHEMISTRIES: Recent Labs  
  03/31/21 
0404 03/30/21 97 [id number] 03/30/21 
1730 03/30/21 
0442 03/29/21 
2256  140 140 138 140  
K 5.2* 5.7* 5.6* 6.3* 5.1 * 115* 114* 112* 114* CO2 13* 16* 15* 17* 18* BUN 41* 39* 37* 34* 32*  
CA 7.1* 7.1* 7.1* 7.0* 7.5* PHOS 5.1*  --   --  7.3* 4.9*  
MG 1.9  --   --  2.2 1.9 PT/INR: 
Recent Labs  
  03/29/21 
1134 INR 1.2* APTT: 
Recent Labs  
  03/29/21 
1134 APTT 24.9 LIVER PROFILE: 
Recent Labs  
  03/29/21 
1134 * * Lab Results Component Value Date/Time ALT (SGPT) 230 (H) 03/29/2021 11:34 AM  
 AST (SGOT) 255 (H) 03/29/2021 11:34 AM  
 Alk. phosphatase 99 03/29/2021 11:34 AM  
 Bilirubin, direct 0.1 04/15/2009 11:40 AM  
 Bilirubin, total 0.8 03/29/2021 11:34 AM  
 
 
Imaging Last 24 Hours: 
Xr Chest Razia Pamella Result Date: 3/31/2021 PORTABLE CHEST RADIOGRAPH/S: 3/31/2021 11:06 AM INDICATION: Pneumonia. COMPARISON: 3/29/2021, 3/23/2017. TECHNIQUE: Portable frontal semiupright radiograph/s of the chest. FINDINGS: There is pulmonary vascular congestion and probable interstitial edema. There may be a layering left pleural effusion. The central airways are patent. No large pneumothorax within the limitations of supine technique. Post CABG and transcatheter aortic valve replacement. An ET tube and NG tube are in appropriate position. There is a small volume of oral contrast in the stomach. New pulmonary vascular congestion and probable interstitial edema. Assessment//Plan Active Problems: 
  Cardiac arrest ([address] (3/29/2021) Assessment: 
 
Condition: In stable condition. Unchanged. Plan:  
Consults: cardiology. Diet Plan: start TF. Administer medications as ordered. (1. CT Head today to eval for anoxic brain injury 2. Wife states no dialysis 2  Agree DNR, poor prognosis 30 mins of critical care time excluding billable procedures ).   
 
 
Electronically signed by Sushil West DO on 3/31/2021 at 8:47 AM

## 2021-03-31 NOTE — PROGRESS NOTES
Progress Note 3/31/2021 8:30 AM 
NAME: Kwesi Howard MRN:  [id number] Admit Diagnosis: Cardiac arrest ([facility] [address] [I46.9] Assessment:  
 
Out of hospital cardiac arrest and resuscitation     PEA initial , followed by V Fib and shock to rhythm and ROSC . Cardiogenic shock . On Levophed and EPI . Intubated, on ventilator. EKG shows sinus rhythm , NSSTT changes. No findings of STEMI .  
  
Recent office visit to f/u Holter which showed multiple runs of NSVT  . Patient has previously declined ICD and again at recent office visit declined ICD. This could well have been a primary arrhythmic arrest . Cannot exclude  Vein graft occlusion .  
  
Chronic EF 20 %.   
  
Cath in 2009 showed severe 3 vessel disease and he underwent CABG  X 4. LIMA to LAD . SVG to RCA and PDA. SVG to Marginal.  
 At that time his EF was 15% . 
  
S/P TAVR for Aortic Stenosis . 2017 Bilateral Carotid ASO Ischemic Cardiomyopathy . Chronic systolic heart failure. Hyperlipidemia. PAD with previous peripheral interventions. Acute renal failure. DNR  
 
3/30  Sinus bradycardia overnight and Amiodarone was stopped. He is requiring higher doses of EPI and LEVO to support BP Creatinine up . Lactic acid elevated. Troponin up to 50 . Echo showed EF 15% at best .   4 chamber dilated heart . I have discussed with his wife here. 3/31  Day 2 : He remains comatose on ventilator. He is not on any sedative and has minimal brainstem reflex response He has spiked a fever to 103. Likely a central fever but need to r/o infectious source such as Aspiration pneumonia . He is on antibiotic He is now off [place] , remains on some EPI . Remains anuric . K+ is better after treatment yesterday Was acidotic earlier this AM and had 1 unit of Bicarb. Rhythm has been stable. Usual cardiologist is Dr Jovi Ramírez . Plan:  
 
Poor prognosis . Continue support Day 2 , and no signs of recovery . Multi organ dysfunction . CT of head is pending . Will get CXR and reassess lungs ,  Possible pneumonia ABG and manage as needed. Wife at the bedside and lengthy discussion with her explaining current status and plans. Limited Prognosis . Advised her that family members should come if possible if they would like to see him before he passes. Cannot predict time course but prognosis remains poor. [x]        High complexity decision making was performed Subjective:  
 
[address] on ventilator. Discussed with RN events overnight. Patient Active Problem List  
Diagnosis Code  Coronary artery disease involving native coronary artery of native heart without angina pectoris I25.10  
 S/P TAVR (transcatheter aortic valve replacement) Z95.2  Cardiac arrest ([facility] [address] I46.9 Review of Systems: 
 
Symptom Y/N Comments  Symptom Y/N Comments Fever/Chills N   Chest Pain N Poor Appetite N   Edema N   
Cough N   Abdominal Pain N Sputum N   Joint Pain N   
SOB/CALHOUN N   Pruritis/Rash N   
Nausea/vomit N   Tolerating PT/OT Y Diarrhea N   Tolerating Diet Y Constipation N   Other Could NOT obtain due to:   
 
Objective:  
  
Physical Exam: 
 
Last 24hrs VS reviewed since prior progress note. Most recent are: 
 
Visit Vitals BP (!) 93/55 Pulse 88 Temp (!) 103.3 °F (39.6 °C) Resp 27 Ht 5' 11\" (1.803 m) Wt 73.6 kg (162 lb 4.1 oz) SpO2 94% BMI 22.63 kg/m² Intake/Output Summary (Last 24 hours) at 3/31/2021 1057 Last data filed at 3/31/2021 0900 Gross per 24 hour Intake 4673.22 ml Output 725 ml Net 3948.22 ml General Appearance: Well developed, well nourished, alert & oriented x 3,  
 no acute distress. Ears/Nose/Mouth/Throat: Hearing grossly normal. 
Neck: Supple. Chest: Lungs clear to auscultation bilaterally. Cardiovascular: Regular rate and rhythm, S1S2 normal, no murmur.  
Abdomen: Soft, non-tender, bowel sounds are active. Extremities: No edema bilaterally. Skin: Warm and dry. PMH/SH reviewed - no change compared to H&P Data Review Telemetry: normal sinus rhythm Lab Data Personally Reviewed: 
 
Recent Labs  
  03/31/21 
0404 03/30/21 
2191 WBC 15.6* 15.8* HGB 11.8* 12.7 HCT 38.1 40.5 * 198 LABRCNT(INR:3,PTP:3,APTT:3,) Recent Labs  
  03/31/21 
0404 03/30/21 97 [id number] 03/30/21 
1730 03/30/21 
0442 03/29/21 
2256  140 140 138 140  
K 5.2* 5.7* 5.6* 6.3* 5.1 * 115* 114* 112* 114* CO2 13* 16* 15* 17* 18* BUN 41* 39* 37* 34* 32* CREA 3.05* 3.04* 3.00* 2.88* 2.07* * 121* 120* 221* 177* CA 7.1* 7.1* 7.1* 7.0* 7.5* MG 1.9  --   --  2.2 1.9 LABRCNT(CPK:3,CpKMB:3,ckndx:3,troiq:3) Lab Results Component Value Date/Time Cholesterol, total 131 05/17/2018 10:06 AM  
 HDL Cholesterol 40 05/17/2018 10:06 AM  
 LDL, calculated 64 05/17/2018 10:06 AM  
 Triglyceride 134 05/17/2018 10:06 AM  
 CHOL/HDL Ratio 3.8 04/15/2010 12:11 PM  
LABRCNT(sgot:3,gpt:3,ap:3,tbiL:3,TP:3,ALB:3,GLOB:3,ggt:3,aml:3,amyp:3,lpse:3,hlpse:3) Recent Labs  
  03/31/21 
0455 PH 7.19* PCO2 36 PO2 35* Lab Results Component Value Date/Time Cholesterol, total 131 05/17/2018 10:06 AM  
 HDL Cholesterol 40 05/17/2018 10:06 AM  
 LDL, calculated 64 05/17/2018 10:06 AM  
 Triglyceride 134 05/17/2018 10:06 AM  
 CHOL/HDL Ratio 3.8 04/15/2010 12:11 PM  
MEDTABLERoque Holden MD 
Recent Labs  
  03/31/21 
6445 PH 7.19* PCO2 36 PO2 35* Medications Personally Reviewed: 
 
Current Facility-Administered Medications Medication Dose Route Frequency  acetaminophen (TYLENOL) solution 650 mg  650 mg Per G Tube Q4H PRN  piperacillin-tazobactam (ZOSYN) 3.375 g in 0.9% sodium chloride (MBP/ADV) 100 mL MBP  3.375 g IntraVENous Q12H  
 heparin (porcine) injection 5,000 Units  5,000 Units SubCUTAneous Q8H  
 sodium zirconium cyclosilicate (LOKELMA) powder packet 10 g  10 g Oral Q8H  
 midazolam (VERSED) 1 mg/mL injection  fentaNYL citrate (PF) injection 100 mcg  100 mcg IntraVENous Q1H PRN  
 fentaNYL (PF) 1,500 mcg/30 mL (50 mcg/mL) infusion  0-200 mcg/hr IntraVENous TITRATE  
 NOREPINephrine (LEVOPHED) 8 mg in 5% dextrose 250mL (32 mcg/mL) infusion  0.5-30 mcg/min IntraVENous TITRATE  chlorhexidine (ORAL CARE KIT) 0.12 % mouthwash 15 mL  15 mL Oral Q12H  EPINEPHrine (ADRENALIN) 5 mg in 0.9% sodium chloride 250 mL infusion  1-20 mcg/min IntraVENous TITRATE  sodium chloride (NS) flush 5-40 mL  5-40 mL IntraVENous Q8H  
 sodium chloride (NS) flush 5-40 mL  5-40 mL IntraVENous PRN  
 acetaminophen (TYLENOL) suppository 650 mg  650 mg Rectal Q6H PRN  polyethylene glycol (MIRALAX) packet 17 g  17 g Oral DAILY PRN  promethazine (PHENERGAN) tablet 12.5 mg  12.5 mg Oral Q6H PRN Or  
 ondansetron (ZOFRAN) injection 4 mg  4 mg IntraVENous Q6H PRN  
 0.9% sodium chloride infusion  125 mL/hr IntraVENous CONTINUOUS  
 potassium chloride 10 mEq in 100 ml IVPB  10 mEq IntraVENous PRN  
 magnesium sulfate 2 g/50 ml IVPB (premix or compounded)  2 g IntraVENous PRN  
 famotidine (PF) (PEPCID) 20 mg in 0.9% sodium chloride 10 mL injection  20 mg IntraVENous DAILY  alcohol 62% (NOZIN) nasal  1 Ampule  1 Ampule Topical Q12H  
 balsam peru-castor oiL (VENELEX) ointment   Topical BID Shy Hernández MD 
 WDL

## 2021-04-01 NOTE — PROGRESS NOTES
1900: Shift report received from [id number] Domenica Castillo: Shift assessment complete. Patient intubated, no command following. Bite block in place. Weak cough and gag present. Turned patient, and brown color vomit poured from mouth. Tube feed stopped. 0015: Reassessment complete, TF restarted. 0400: Reassessment complete. Blood work and venous gas drawn Shift report given to [facility].

## 2021-04-01 NOTE — PROGRESS NOTES
0700  Bedside and verbal report from [facility], RN. 
0800  Assessment completed. Patient turned and positioned. Dr. Aneta Franco here to see patient. 0900  Wife at bedside. Dr. Aneta Franco paged via . 1000  Dr. Aneta Franco at bedside, talked with wife for patient status update. 1100  Wife continues at the bedside. Water blister now noted on right ear lobe. Will continue to monitor. 1200  Reassessment completed. Patient turned and positioned. 1300  Wife remains at the bedside. No further changes noted at this time. 1400  Turned and positioned. Patient vomited large amount of tube feeding-like emesis. Tube feeding stopped and connected to low wall suction. 150 ml immediately obtained. Bed linens and gown changed. OK to keep NG to wall suction per Dr. Leslie Sprague. 1500  Wife remains at the bedside. 1600  Reassessment completed. 1700  Dr. Sheryl Rock at here to see patient and spoke with wife at bedside. 1800  Dr. Aneta Franco here to see patient. 1900  Report given to [facility], RN.

## 2021-04-01 NOTE — PROGRESS NOTES
Pharmacy Automatic Renal Dosing Protocol - Antimicrobials Indication for Antimicrobials: aspiration PNA Current Regimen of Each Antimicrobial:  
Piperacillin/tazobactam 3.375g IV q12h, start 3/31; day 2 Previous Antimicrobial Therapy: N/a Significant Cultures:  
none Paralysis, amputations, malnutrition: none Labs: 
Recent Labs 21 
0320 21 
0404 21 97 [id number] 21 
0247 21 
8863 CREA 2.71* 3.05* 3.04*   < > 2.88* BUN 45* 41* 39*   < > 34* WBC 11.8* 15.6*  --   --  15.8*  
 < > = values in this interval not displayed. Temp (24hrs), Av °F (37.2 °C), Min:98.5 °F (36.9 °C), Max:99.8 °F (37.7 °C) Creatinine Clearance (mL/min) or Dialysis: 22.3 mL/min (actual weight) Impression/Plan:  
Scr improved Increase zosyn to q8h Antimicrobial stop date pending Pharmacy will follow daily and adjust medications as appropriate for renal function and/or serum levels. Thank you, JENNIFER Souza

## 2021-04-01 NOTE — PROCEDURES
[address] 
EEG Name:  Anh Sigala 
MR#:  [id number] :  1940 ACCOUNT #:  [de-identified] DATE OF SERVICE:  2021 CLINICAL INDICATION:  The patient is an 18-year-old male with a history of sudden cardiac arrest.  The patient with possible anoxic brain injury. EEG to rule out nonconvulsive status, rule out seizures, rule out convulsive syncope. EEG CLASSIFICATION:  On this patient is dysrhythmia grade II, generalized. DESCRIPTION OF THE RECORD:  The background of this recording contains a posteriorly located occipital alpha rhythm of 8-9 Hz that is somewhat poorly formed and attenuate with eye opening. Throughout the recording, there was a moderate increase in some generalized 2-6 Hz activity seen without any areas of clear focal slowing or spike or spike-and-wave discharges being seen. Hyperventilation was not performed. Photic stimulation produced a minimal driving response in the posterior head regions. The patient appeared to enter brief states of sleep with K complexes and sleep spindles seen in the central head regions. INTERPRETATION:  This is a moderately abnormal electroencephalogram due to the wide spread generalized slow wave activity but showing no focal slowing or spike or spike-and-wave discharges being seen. The study is consistent with diffuse encephalopathy of toxic, metabolic or degenerative type. Clinical correlation recommended. Ellie Anguiano MD 
 
 
[id number] 
D:  2021 21:28 
T:  2021 23:43 JOB #:  [id number] CC:  Theron Blake MD

## 2021-04-01 NOTE — INTERDISCIPLINARY ROUNDS
Critical care interdisciplinary rounds held on 04/01/2021. Following members present, Pharmacy, Diabetes Treatment, Case Management, Respiratory Therapy, Physical Therapy and Nutrition. Led by Guillaume Almanza. Rupal Raines RN and Dr. Tio Kidd. Plan of care discussed. See clinical pathway for plan of care and interventions and desired outcomes

## 2021-04-01 NOTE — PROGRESS NOTES
Physical Therapy Discharge Summary Addendum:  Date: 1/5/2021  Total Number of Visits: 8  Referred by: Lukas Hoffmann MD  Medical Diagnosis (from order):   Diagnosis Information      Diagnosis    726.71 (ICD-9-CM) - M76.61 (ICD-10-CM) - Achilles tendinitis of right lower extremity                Patient phoned and reported she has been doing well with self management, is feeling much better, and is ready for discharge from physical therapy at this time.  Status of goals: based on last known status anticipate goals met    Discharge Measures:   Treatment Category: Ankle/Foot, Other Non-Surgical  Outcome Measure:   Lower Extremity Functional Scale: Initial Score: 41; Discharge Outcome Score: 59     Progress Note 4/1/2021 8:30 AM 
NAME: Yodit Burch MRN:  [id number] Admit Diagnosis: Cardiac arrest ([facility] [address] [I46.9] Assessment:  
 
Out of hospital cardiac arrest and resuscitation     PEA initial , followed by V Fib and shock to rhythm and ROSC . Cardiogenic shock . On Levophed and EPI . Intubated, on ventilator. EKG shows sinus rhythm , NSSTT changes. No findings of STEMI .  
  
Recent office visit to f/u Holter which showed multiple runs of NSVT  . Patient has previously declined ICD and again at recent office visit declined ICD. This could well have been a primary arrhythmic arrest . Cannot exclude  Vein graft occlusion .  
  
Chronic EF 20 %.   
  
Cath in 2009 showed severe 3 vessel disease and he underwent CABG  X 4. LIMA to LAD . SVG to RCA and PDA. SVG to Marginal.  
 At that time his EF was 15% . 
  
S/P TAVR for Aortic Stenosis . 2017 Bilateral Carotid ASO Ischemic Cardiomyopathy . Chronic systolic heart failure. Hyperlipidemia. PAD with previous peripheral interventions. Acute renal failure. DNR  
 
3/30  Sinus bradycardia overnight and Amiodarone was stopped. He is requiring higher doses of EPI and LEVO to support BP Creatinine up . Lactic acid elevated. Troponin up to 50 . Echo showed EF 15% at best .   4 chamber dilated heart . I have discussed with his wife here. 3/31  Day 2 : He remains comatose on ventilator. He is not on any sedative and has minimal brainstem reflex response He has spiked a fever to 103. Likely a central fever but need to r/o infectious source such as Aspiration pneumonia . He is on antibiotic He is now off [place] , remains on some EPI . Remains anuric . K+ is better after treatment yesterday Was acidotic earlier this AM and had 1 unit of Bicarb. Rhythm has been stable. 4/1 remains comatose on ventilator.   He has some reflex responses,  Eyelids twitch to touch, some eyelid movement in response to sternal rub. Pupils small , fixed. CXR showed pulmonary edema . Still only minimal urine output, though creatinine down a little. Epi dose has come down to 6 mcgs now He's been afebrile since the spike yesterday AM . EEG result pending . CT of head 3/31 showed no change. Usual cardiologist is Dr Meryl Vela . Plan:  
 
Poor prognosis . Continue support Will give a dose of Lasix and see if responds. Decrease IV fluid rate . With edema and no urine output Wife at the bedside and lengthy discussion with her explaining current status and plans. Limited Prognosis . Advised her that family members should come if possible if they would like to see him before he passes. Cannot predict time course but prognosis remains poor. [x]        High complexity decision making was performed Subjective:  
 
[address] on ventilator. Discussed with RN events overnight. Patient Active Problem List  
Diagnosis Code  Coronary artery disease involving native coronary artery of native heart without angina pectoris I25.10  
 S/P TAVR (transcatheter aortic valve replacement) Z95.2  Cardiac arrest ([facility] [address] I46.9  Severe anoxic-ischemic encephalopathy (HCC) G93.1, I67.82  
 Convulsive syncope R55  Acute alteration in mental status R41.82 Review of Systems: 
 
Symptom Y/N Comments  Symptom Y/N Comments Fever/Chills N   Chest Pain N Poor Appetite N   Edema N   
Cough N   Abdominal Pain N Sputum N   Joint Pain N   
SOB/CALHOUN N   Pruritis/Rash N   
Nausea/vomit N   Tolerating PT/OT Y Diarrhea N   Tolerating Diet Y Constipation N   Other Could NOT obtain due to:   
 
Objective:  
  
Physical Exam: 
 
Last 24hrs VS reviewed since prior progress note. Most recent are: 
 
Visit Vitals /64 Pulse 66 Temp 99.4 °F (37.4 °C) Resp 26 Ht 5' 11\" (1.803 m) Wt 73.6 kg (162 lb 4.1 oz) SpO2 97% BMI 22.63 kg/m² Intake/Output Summary (Last 24 hours) at 4/1/2021 9191 Last data filed at 4/1/2021 0400 Gross per 24 hour Intake 3285.1 ml Output 685 ml Net 2600.1 ml  
  
 
General Appearance: Well developed, well nourished, alert & oriented x 3,  
 no acute distress. Ears/Nose/Mouth/Throat: Hearing grossly normal. 
Neck: Supple. Chest: Lungs clear to auscultation bilaterally. Cardiovascular: Regular rate and rhythm, S1S2 normal, no murmur. Abdomen: Soft, non-tender, bowel sounds are active. Extremities: No edema bilaterally. Skin: Warm and dry. PMH/SH reviewed - no change compared to H&P Data Review Telemetry: normal sinus rhythm Lab Data Personally Reviewed: 
 
Recent Labs 04/01/21 
0320 03/31/21 
0404 WBC 11.8* 15.6* HGB 10.8* 11.8* HCT 33.7* 38.1 PLT 99* 147* LABRCNT(INR:3,PTP:3,APTT:3,) Recent Labs 04/01/21 
0320 03/31/21 
0404 03/30/21 97 [id number] 03/30/21 
9569 03/30/21 
2070  140 140   < > 138  
K 4.1 5.2* 5.7*   < > 6.3*  
* 116* 115*   < > 112* CO2 17* 13* 16*   < > 17* BUN 45* 41* 39*   < > 34* CREA 2.71* 3.05* 3.04*   < > 2.88* * 121* 121*   < > 221* CA 7.3* 7.1* 7.1*   < > 7.0*  
MG 1.9 1.9  --   --  2.2  
 < > = values in this interval not displayed. LABRCNT(CPK:3,CpKMB:3,ckndx:3,troiq:3) Lab Results Component Value Date/Time Cholesterol, total 131 05/17/2018 10:06 AM  
 HDL Cholesterol 40 05/17/2018 10:06 AM  
 LDL, calculated 64 05/17/2018 10:06 AM  
 Triglyceride 134 05/17/2018 10:06 AM  
 CHOL/HDL Ratio 3.8 04/15/2010 12:11 PM  
LABRCNT(sgot:3,gpt:3,ap:3,tbiL:3,TP:3,ALB:3,GLOB:3,ggt:3,aml:3,amyp:3,lpse:3,hlpse:3) Recent Labs  
  03/31/21 
1113 03/31/21 
0455 PH 7.27* 7.19* PCO2 28* 36  
PO2 74* 35* Lab Results Component Value Date/Time  Cholesterol, total 131 05/17/2018 10:06 AM  
 HDL Cholesterol 40 05/17/2018 10:06 AM  
 LDL, calculated 64 05/17/2018 10:06 AM  
 Triglyceride 134 05/17/2018 10:06 AM CHOL/HDL Ratio 3.8 04/15/2010 12:11 PM  
[facility] CArekrystyna Renteria MD 
Recent Labs  
  03/31/21 
1113 03/31/21 
0455 PH 7.27* 7.19* PCO2 28* 36  
PO2 74* 35* Medications Personally Reviewed: 
 
Current Facility-Administered Medications Medication Dose Route Frequency  furosemide (LASIX) injection 100 mg  100 mg IntraVENous ONCE  
 acetaminophen (TYLENOL) solution 650 mg  650 mg Per G Tube Q4H PRN  piperacillin-tazobactam (ZOSYN) 3.375 g in 0.9% sodium chloride (MBP/ADV) 100 mL MBP  3.375 g IntraVENous Q12H  
 0.45% sodium chloride infusion  50 mL/hr IntraVENous CONTINUOUS  
 heparin (porcine) injection 5,000 Units  5,000 Units SubCUTAneous Q8H  
 sodium zirconium cyclosilicate (LOKELMA) powder packet 10 g  10 g Oral Q8H  
 fentaNYL citrate (PF) injection 100 mcg  100 mcg IntraVENous Q1H PRN  
 fentaNYL (PF) 1,500 mcg/30 mL (50 mcg/mL) infusion  0-200 mcg/hr IntraVENous TITRATE  
 NOREPINephrine (LEVOPHED) 8 mg in 5% dextrose 250mL (32 mcg/mL) infusion  0.5-30 mcg/min IntraVENous TITRATE  chlorhexidine (ORAL CARE KIT) 0.12 % mouthwash 15 mL  15 mL Oral Q12H  EPINEPHrine (ADRENALIN) 5 mg in 0.9% sodium chloride 250 mL infusion  1-20 mcg/min IntraVENous TITRATE  sodium chloride (NS) flush 5-40 mL  5-40 mL IntraVENous Q8H  
 sodium chloride (NS) flush 5-40 mL  5-40 mL IntraVENous PRN  
 acetaminophen (TYLENOL) suppository 650 mg  650 mg Rectal Q6H PRN  polyethylene glycol (MIRALAX) packet 17 g  17 g Oral DAILY PRN  promethazine (PHENERGAN) tablet 12.5 mg  12.5 mg Oral Q6H PRN Or  
 ondansetron (ZOFRAN) injection 4 mg  4 mg IntraVENous Q6H PRN  potassium chloride 10 mEq in 100 ml IVPB  10 mEq IntraVENous PRN  
 magnesium sulfate 2 g/50 ml IVPB (premix or compounded)  2 g IntraVENous PRN  
 famotidine (PF) (PEPCID) 20 mg in 0.9% sodium chloride 10 mL injection  20 mg IntraVENous DAILY  alcohol 62% (NOZIN) nasal  1 Ampule  1 Ampule Topical Q12H  
 balsam peru-castor oiL (VENELEX) ointment   Topical BID Nickolas Loaiza MD

## 2021-04-02 NOTE — PROGRESS NOTES
Comprehensive Nutrition Assessment Type and Reason for Visit: Peri Schwarz Nutrition Recommendations/Plan: If family wants to continue aggressive care, recommend considering reglan If TF to be resumed, recommend switching to TwoCal HN @ eventual goal rate of 40mL/h + 100mL flush q 4h (provides 1920kcals/[id number]/1272ml) Nutrition Assessment:   Chart reviewed, case discussed during CCU rounds. Pt remains intubated, needs re-estimated. TF on hold for vomiting overnight. Plan is to hold TF over the weekend. Per neuro workup pt with likely severe anoxia, Palliative is meeting with the family at this time. K 3.6 and phos 3.8. If TF resumed (would consider reglan) recommend switching to standard formula. Provided recommendations above which would meet 100% kcal and protein needs. Epi at 3, MAP's are WNL. BM noted on 3/29. Estimated Daily Nutrient Needs: 
Energy (kcal): PSU 9889 (MSJ 2804); Weight Used for Energy Requirements: Current Protein (g): 74-88g (1-1.2gPro/kg); Weight Used for Protein Requirements: Current Fluid (ml/day): 1200mL or per MD; Method Used for Fluid Requirements: Other (comment) Nutrition Related Findings:  Meds: pepcid, lascorey, [email or web address]; Drips: epi. Edema: trace/pitting-BLE. BM 3/29 Wounds:   
None Current Nutrition Therapies: DIET NPO 
DIET TUBE FEEDING Please hold for MAP's consistently <60 (TF on hold for vomiting) Anthropometric Measures: 
· Height:  5' 11\" (180.3 cm) · Current Body Wt:  73.6 kg (162 lb 4.1 oz) · Ideal Body Wt:  172 lbs:  94.3 % · BMI Category:  Normal weight (BMI 22.0-24.9) age over 72 Nutrition Diagnosis:  
· Inadequate protein-energy intake related to impaired respiratory function as evidenced by NPO or clear liquid status due to medical condition Previous dx continues. Nutrition Interventions:  
Food and/or Nutrient Delivery: Start tube feeding, Modify tube feeding Nutrition Education and Counseling: No recommendations at this time Coordination of Nutrition Care: Continue to monitor while inpatient, Interdisciplinary rounds Goals: Pt will resume TF vs go comfort in 2-4 days. Nutrition Monitoring and Evaluation:  
Behavioral-Environmental Outcomes: None identified Food/Nutrient Intake Outcomes: Enteral nutrition intake/tolerance Physical Signs/Symptoms Outcomes: Biochemical data, Nutrition focused physical findings, Skin, Weight, Fluid status or edema, Hemodynamic status Discharge Planning: Too soon to determine Electronically signed by Chantal Dye RD, [address]  on 4/2/2021 at 1:05 PM 
 
Contact: [phone]

## 2021-04-02 NOTE — PROGRESS NOTES
0700: Bedside and Verbal shift change report given to Nallely Heredia RN (oncoming nurse) by Misha Murphy RN (offgoing nurse). Report included the following information SBAR, Kardex, Intake/Output, MAR, Accordion and Cardiac Rhythm SR with BBB.  
 
0800: Assessment completed. Patient intubated on ventilator, unresponsive, no sedation. Withdrawal in 3/4 extremities, no response to stimulus in LUE 
 
0830: Patient's wife at bedside. 0845: Dr. Connor Gonzalez at bedside to speak with wife. Epi titrated to 2mcg. 0900: Patient with SBP in 80s; epi titrated back to 3mcg. 0945: Participated in [facility]. Orders for palliative consult received. 1100: Palliative NP, Kinza Perry, at bedside to speak with patient's wife. 1200: Reassessment. No withdrawal in 4/4 extremities with this assessment. Otherwise, neuro exam stable from previous. 1445: 2nd attempt to wean epi; titrated to 2mcg. 1600: Reassessment. No changes to previous. Wife remains at bedside. 1645: SBP in 70s; epi titrated back to 3mcg. 1900: Bedside shift report given to HUYEN Brownlee.

## 2021-04-02 NOTE — INTERDISCIPLINARY ROUNDS
Interdisciplinary team rounds were held 4/2/2021 with the following team members:Care Management, Diabetes Treatment Specialist, Nursing, Nutrition, Pharmacy, Physical Therapy, Physician and Clinical Coordinator. Plan of care discussed. See clinical pathway and/or care plan for interventions and desired outcomes.

## 2021-04-02 NOTE — CONSULTS
Palliative Medicine Consult Chaz: [id number] ([id number]) Patient Name: Erica Aguilar YOB: 1940 Date of Initial Consult: 4/2/2021 Reason for Consult: Care Decisions Requesting Provider: Liudmila Mariano MD 
Primary Care Physician: Clare Babcock MD 
 
 SUMMARY:  
Erica Aguilar is a 79 y.o. with a past history of CAD, MI, Ischemic Cardiomyopathy, HTN, CABG, and Aortic Stenosis s/p TAVR, who was admitted on 3/29/2021 from a bike ride with his wife with a diagnosis of out of hospital cardiac arrest with estimated down time of 30-60min Patient was biking with his wife when he stopped his bike, got off an collapsed on the ground. His wife reports that bystander CPR was started within two minutes. When EMS arrived, he was pulseless in PEA. He was given epi x2, then found to be in v-fib so was defibrillated x1 with ROSC. Noted Cardiology notes that patient was recently seen in the office to f/u on Holter which showed multiple runs of NSVT. He had previously declined an ICD and declined again at that visit. ECHO here shows 4 chambers dilated and LV EF of 15% with severe LV dysfunction Has been of sedation since 3/30 and remains comatose with only minimal reflexes. EEG and CT shows moderate to severe brain injury. Initial kidney injury but this is improving some and he is making urine. Social:  Mr Marian Munoz has been  to his wife Igor Maldonado for 20+ years. She is his 3rd marriage. He is an active man, loves bike riding and hiking with his wife. He also is an avid musician- and accomplished pianist.  His wife shared that if he survives this, but is unable to play the piano, that will kill him Patient and wife identify Agnostic He has children from a previous marriage- daughter from [place] is to fly into [place] today PALLIATIVE DIAGNOSES:  
1. DNR Discussion 2. Goals of Care 3. Unresponsive 4. PEA Arrest 
5. KELLY 6. Ischemic Cardiomyopathy 7. Hypoxic respiratory failure on vent PLAN:  
1. Did an extensive review of the chart and spoke with [facility] his nurse 2. Mr Marisela Rosales is intubated, comatose off sedation, post out of hospital arrest. 
3. I met with his wife Bijan Mckeon at bedside. She is holding martel at her husbands bedside. 4. We talked some about his life, their life together, what makes life worth living to him. 5. She has some understanding of his current situation, but is holding out hope that he will make a meaningful recovery against all odds. 6. She is not in a place to talk about a compassionate withdraw at this point, as she is still hoping for a recovery. 7. It was clearly hard to hear that he will never recover to the place where he is playing the piano again- she is not able to accept that fact at this stage. 8. I tried to be compassionate but clear in our conversation 9. She is not thrilled with the idea of us talking again- I think that she needs someone to just sit with her to bring comfort, and not \"bad news\". Even so, she was open to me checking in on her Monday if he is still here 10. She did reiterate to me that he is a DNR in the event of another cardiac arrest, but she still wants aggressive treatment in the meantime. 11. Will check back Monday- may have my  check in my stead pending the events of the weekend 12. Initial consult note routed to primary continuity provider and/or primary health care team members 13. Communicated plan of care with: M[facility] Team 
 
 GOALS OF CARE / TREATMENT PREFERENCES:  
 
GOALS OF CARE: 
Patient/Health Care Proxy Stated Goals: Prolong life TREATMENT PREFERENCES:  
Code Status: DNR Advance Care Planning: 
[x] The [employer] Interdisciplinary Team has updated the ACP Navigator with [address] and Patient Capacity Primary Decision Maker (Active): Jackelin Eckert Spouse - [phone] No flowsheet data found.  
 
Medical Interventions: Full interventions Other Instructions: Other: As far as possible, the palliative care team has discussed with patient / health care proxy about goals of care / treatment preferences for patient. HISTORY:  
 
History obtained from: chart, wife CHIEF COMPLAINT: none HPI/SUBJECTIVE: The patient is:  
[] Verbal and participatory [x] Non-participatory due to: Unresponsive, on vent Clinical Pain Assessment (nonverbal scale for severity on nonverbal patients):  
Clinical Pain Assessment Severity: 0 Activity (Movement): Lying quietly, normal position Duration: for how long has pt been experiencing pain (e.g., 2 days, 1 month, years) Frequency: how often pain is an issue (e.g., several times per day, once every few days, constant) FUNCTIONAL ASSESSMENT:  
 
Palliative Performance Scale (PPS): PPS: 10 PSYCHOSOCIAL/SPIRITUAL SCREENING:  
 
Palliative IDT has assessed this patient for cultural preferences / practices and a referral made as appropriate to needs (Cultural Services, Patient Advocacy, Ethics, etc.) Any spiritual / Sikhism concerns: 
[] Yes /  [x] No 
 
Caregiver Burnout: 
[] Yes /  [x] No /  [] No Caregiver Present Anticipatory grief assessment:  
[x] Normal  / [] Maladaptive ESAS Anxiety: Anxiety: 0 
 
ESAS Depression: Depression: 0 REVIEW OF SYSTEMS:  
 
Positive and pertinent negative findings in ROS are noted above in HPI. The following systems were [x] reviewed / [] unable to be reviewed as noted in HPI Other findings are noted below. Systems: constitutional, ears/nose/mouth/throat, respiratory, gastrointestinal, genitourinary, musculoskeletal, integumentary, neurologic, psychiatric, endocrine. Positive findings noted below. Modified ESAS Completed by: provider Fatigue: 10    
Depression: 0 Pain: 0 Anxiety: 0 Nausea: 0 Dyspnea: 0 Stool Occurrence(s): 1  PHYSICAL EXAM:  
 
From RN flowsheet: Wt Readings from Last 3 Encounters:  
03/29/21 162 lb 4.1 oz (73.6 kg) 09/03/19 142 lb (64.4 kg) 07/12/18 140 lb 3.2 oz (63.6 kg) Blood pressure 99/62, pulse 65, temperature 98.6 °F (37 °C), resp. rate 15, height 5' 11\" (1.803 m), weight 162 lb 4.1 oz (73.6 kg), SpO2 99 %. Pain Scale 1: Behavioral Pain Scale (BPS) Last bowel movement, if known:  
 
Constitutional: unresponsive Respiratory: on vent Skin: warm, dry HISTORY:  
 
Active Problems: 
  Cardiac arrest ([facility] [address] (3/29/2021) Severe anoxic-ischemic encephalopathy ([facility] [address] (3/31/2021) Convulsive syncope (3/31/2021) Acute alteration in mental status (3/31/2021) Past Medical History:  
Diagnosis Date  Aortic stenosis  CAD (coronary artery disease) 1989 MI  
 Carotid stenosis   
 mild bilateral  
 Heart failure ([address] ischemic cardiomyopathy  Hypertension Past Surgical History:  
Procedure Laterality Date  HX HERNIA REPAIR  2012  [place] CABG, ARTERY-VEIN, FOUR  2009  [place] CARDIAC SURG PROCEDURE UNLIST  06/30/2017 TAVR  VASCULAR SURGERY PROCEDURE UNLIST  2014  
 bilat popliteal stenting Family History Problem Relation Age of Onset  Sudden Death Sister   
     cause unknown  Lung Disease Mother  Hypertension Father History reviewed, no pertinent family history. Social History Tobacco Use  Smoking status: Never Smoker  Smokeless tobacco: Never Used Substance Use Topics  Alcohol use: Yes Comment: socially Allergies Allergen Reactions  Niacin Other (comments) Severe flushing Current Facility-Administered Medications Medication Dose Route Frequency  piperacillin-tazobactam (ZOSYN) 3.375 g in 0.9% sodium chloride (MBP/ADV) 100 mL MBP  3.375 g IntraVENous Q8H  
 furosemide (LASIX) injection 80 mg  80 mg IntraVENous DAILY  acetaminophen (TYLENOL) solution 650 mg  650 mg Per G Tube Q4H PRN  
 0.45% sodium chloride infusion  50 mL/hr IntraVENous CONTINUOUS  
 heparin (porcine) injection 5,000 Units  5,000 Units SubCUTAneous Q8H  
 fentaNYL citrate (PF) injection 100 mcg  100 mcg IntraVENous Q1H PRN  
 fentaNYL (PF) 1,500 mcg/30 mL (50 mcg/mL) infusion  0-200 mcg/hr IntraVENous TITRATE  chlorhexidine (ORAL CARE KIT) 0.12 % mouthwash 15 mL  15 mL Oral Q12H  EPINEPHrine (ADRENALIN) 5 mg in 0.9% sodium chloride 250 mL infusion  1-20 mcg/min IntraVENous TITRATE  sodium chloride (NS) flush 5-40 mL  5-40 mL IntraVENous Q8H  
 sodium chloride (NS) flush 5-40 mL  5-40 mL IntraVENous PRN  
 acetaminophen (TYLENOL) suppository 650 mg  650 mg Rectal Q6H PRN  polyethylene glycol (MIRALAX) packet 17 g  17 g Oral DAILY PRN  promethazine (PHENERGAN) tablet 12.5 mg  12.5 mg Oral Q6H PRN Or  
 ondansetron (ZOFRAN) injection 4 mg  4 mg IntraVENous Q6H PRN  potassium chloride 10 mEq in 100 ml IVPB  10 mEq IntraVENous PRN  
 magnesium sulfate 2 g/50 ml IVPB (premix or compounded)  2 g IntraVENous PRN  
 famotidine (PF) (PEPCID) 20 mg in 0.9% sodium chloride 10 mL injection  20 mg IntraVENous DAILY  alcohol 62% (NOZIN) nasal  1 Ampule  1 Ampule Topical Q12H  
 balsam peru-castor oiL (VENELEX) ointment   Topical BID  
 
 
 
 LAB AND IMAGING FINDINGS:  
 
Lab Results Component Value Date/Time WBC 15.1 (H) 04/02/2021 04:34 AM  
 HGB 10.9 (L) 04/02/2021 04:34 AM  
 PLATELET 630 (L) 25/92/1016 04:34 AM  
 
Lab Results Component Value Date/Time Sodium 145 04/02/2021 04:34 AM  
 Potassium 3.6 04/02/2021 04:34 AM  
 Chloride 114 (H) 04/02/2021 04:34 AM  
 CO2 22 04/02/2021 04:34 AM  
 BUN 47 (H) 04/02/2021 04:34 AM  
 Creatinine 2.46 (H) 04/02/2021 04:34 AM  
 Calcium 7.7 (L) 04/02/2021 04:34 AM  
 Magnesium 1.8 04/02/2021 04:34 AM  
 Phosphorus 3.8 04/02/2021 04:34 AM  
  
Lab Results Component Value Date/Time Alk.  phosphatase 99 03/29/2021 11:34 AM  
 Protein, total 5.6 (L) 03/29/2021 11:34 AM  
 Albumin 2.6 (L) 03/29/2021 11:34 AM  
 Globulin 3.0 03/29/2021 11:34 AM  
 
Lab Results Component Value Date/Time INR 1.2 (H) 03/29/2021 11:34 AM  
 Prothrombin time 12.6 (H) 03/29/2021 11:34 AM  
 aPTT 24.9 03/29/2021 11:34 AM  
  
No results found for: IRON, FE, TIBC, IBCT, PSAT, FERR Lab Results Component Value Date/Time pH 7.27 (L) 03/31/2021 11:13 AM  
 PCO2 28 (L) 03/31/2021 11:13 AM  
 PO2 74 (L) 03/31/2021 11:13 AM  
 
No components found for: [place] No results found for: CPK, CKMB Total time: 70m Counseling / coordination time, spent as noted above: 60m 
> 50% counseling / coordination?: y 
 
Prolonged service was provided for  []30 min   []75 min in face to face time in the presence of the patient, spent as noted above. Time Start:  
Time End:  
Note: this can only be billed with 53288 (initial) or 18188 (follow up). If multiple start / stop times, list each separately.

## 2021-04-02 NOTE — PROGRESS NOTES
Care Management: 
 
Pt is an 80 yo male admitted for out of hospital cardiac arrest. Intubated. Anoxic brain injury. Neuro following . Family has agreed to DNR status and do not want HD. Prognosis is poor.  
 
Palliative consulted to assist family with care decisions.  
  
CM remains avbl to assist w/ planning as appropriate. 
  
Robin Began RN ANDREI

## 2021-04-02 NOTE — PROGRESS NOTES
Consult REFERRED BY: 
Mica Nielsen MD 
 
CHIEF COMPLAINT: Cardiac arrest and anoxic brain injury Subjective:  
 
Kellie Mares is a 79 y.o. right-handed  male we are seeing at the request of the intensivist to evaluate patient's neurologic status after cardiac arrest and prolonged resuscitation prognosis and chances of recovery. Patient had PEA, and was coded in the field and went into V. fib and his estimated time down was 30 to 60 minutes. His wife states that CPR began in 1 to 2 minutes after his collapse. He has a known history of recurrent V. fib for which he refused a defibrillator. Patient had a normal CT scan of the head on admission and repeat done yesterday showed only some questionable mild ischemic changes seen in the brain but no change on admission 1, and his EEG showed moderate generalized slowing, no seizure activity, no burst suppression pattern, and no focal abnormalities. Patient with intact cranial nerves, and may be opening his eyes a little more, and moving just a little bit more, but no other major change and remains comatose. Discussed with cardiology, and this will take probably a week to 10 days to see how he does unless something further happens with his multiorgan failure. Also discussed with the wife in detail that we just cannot give a solid prediction of his level of recovery at this time. Did advise her again he has significant injury and will not recover to normal function. Patient has a history of mild bilateral carotid stenosis. He has a past history of significant ischemic heart disease and cardiomyopathy and recurrent V. fib. He also has hypertension and previous MI, and has had a aortic valve replaced and CABG and peripheral arterial disease status post bilateral popliteal stenting his medications include Effient Lasix Prinivil Coreg aspirin Lipitor co-Q10 and Zetia. Past Medical History:  
Diagnosis Date  Aortic stenosis  CAD (coronary artery disease) 1989 MI  
 Carotid stenosis   
 mild bilateral  
 Heart failure ([facility] [address] ischemic cardiomyopathy  Hypertension Past Surgical History:  
Procedure Laterality Date  HX HERNIA REPAIR  2012  [place] CABG, ARTERY-VEIN, FOUR  2009  [place] CARDIAC SURG PROCEDURE UNLIST  06/30/2017 TAVR  VASCULAR SURGERY PROCEDURE UNLIST  2014  
 bilat popliteal stenting Family History Problem Relation Age of Onset  Sudden Death Sister   
     cause unknown  Lung Disease Mother  Hypertension Father Social History Tobacco Use  Smoking status: Never Smoker  Smokeless tobacco: Never Used Substance Use Topics  Alcohol use: Yes Comment: socially Current Facility-Administered Medications:  
  [COMPLETED] piperacillin-tazobactam (ZOSYN) 3.375 g in 0.9% sodium chloride (MBP/ADV) 100 mL MBP, 3.375 g, IntraVENous, ONCE, Stopped at 04/01/21 0600 **FOLLOWED BY** piperacillin-tazobactam (ZOSYN) 3.375 g in 0.9% sodium chloride (MBP/ADV) 100 mL MBP, 3.375 g, IntraVENous, Q8H, Tanner Joyce MD, Last Rate: 25 mL/hr at 04/01/21 2054, 3.375 g at 04/01/21 2054   [START ON 4/2/2021] furosemide (LASIX) injection 80 mg, 80 mg, IntraVENous, DAILY, Roberta Paulson MD 
  acetaminophen (TYLENOL) solution 650 mg, 650 mg, Per G Tube, Q4H PRN, Milli Joyce MD 
  0.45% sodium chloride infusion, 50 mL/hr, IntraVENous, CONTINUOUS, Roberta Paulson MD, Last Rate: 50 mL/hr at 04/01/21 1557, 50 mL/hr at 04/01/21 1557   heparin (porcine) injection 5,000 Units, 5,000 Units, SubCUTAneous, Q8H, Mary Ann Cuevas DO, Stopped at 04/01/21 2200 
  fentaNYL citrate (PF) injection 100 mcg, 100 mcg, IntraVENous, Q1H PRN, Mynor Aj MD, 100 mcg at 04/01/21 1435 
  fentaNYL (PF) 1,500 mcg/30 mL (50 mcg/mL) infusion, 0-200 mcg/hr, IntraVENous, TITRATE, Mynor Aj MD, Stopped at 03/30/21 7807   NOREPINephrine (LEVOPHED) 8 mg in 5% dextrose 250mL (32 mcg/mL) infusion, 0.5-30 mcg/min, IntraVENous, TITRATE, Min-Venditti, Jerral Aase, MD, Stopped at 03/31/21 0600   chlorhexidine (ORAL CARE KIT) 0.12 % mouthwash 15 mL, 15 mL, Oral, Q12H, Min-Venditti, Jerral Aase, MD, 15 mL at 04/01/21 2054   EPINEPHrine (ADRENALIN) 5 mg in 0.9% sodium chloride 250 mL infusion, 1-20 mcg/min, IntraVENous, TITRATE, Rey Story NP, Last Rate: 12 mL/hr at 04/01/21 1307, 4 mcg/min at 04/01/21 1307   sodium chloride (NS) flush 5-40 mL, 5-40 mL, IntraVENous, Q8H, Ashok Vicente DO, Stopped at 04/01/21 2200 
  sodium chloride (NS) flush 5-40 mL, 5-40 mL, IntraVENous, PRN, Fabiolaeliane Wisdomer, DO 
  [DISCONTINUED] acetaminophen (TYLENOL) tablet 650 mg, 650 mg, Oral, Q6H PRN, 650 mg at 03/31/21 0837 **OR** acetaminophen (TYLENOL) suppository 650 mg, 650 mg, Rectal, Q6H PRN, Fabiola Stammer, DO 
  polyethylene glycol (MIRALAX) packet 17 g, 17 g, Oral, DAILY PRN, Fabiola Stammer, DO 
  promethazine (PHENERGAN) tablet 12.5 mg, 12.5 mg, Oral, Q6H PRN **OR** ondansetron (ZOFRAN) injection 4 mg, 4 mg, IntraVENous, Q6H PRN, Fabiola Tedmmer, DO, 4 mg at 03/29/21 1329   potassium chloride 10 mEq in 100 ml IVPB, 10 mEq, IntraVENous, PRN, Fabiola Stammer, DO 
  magnesium sulfate 2 g/50 ml IVPB (premix or compounded), 2 g, IntraVENous, PRN, Ashok Cochran DO 
  famotidine (PF) (PEPCID) 20 mg in 0.9% sodium chloride 10 mL injection, 20 mg, IntraVENous, DAILY, Dwayne Fleming DO, 20 mg at 04/01/21 0814 
  alcohol 62% (NOZIN) nasal  1 Ampule, 1 Ampule, Topical, Q12H, Fabiola Stammer, DO, 1 Ampule at 04/01/21 2054   balsam peru-castor oiL (VENELEX) ointment, , Topical, BID, Fabiola Stammer, DO, Given at 04/01/21 2055 Allergies Allergen Reactions  Niacin Other (comments) Severe flushing MRI Results (most recent): 
Results from Hospital Encounter encounter on 03/21/19 MRI SHOULDER LT WO CONT Narrative EXAM: MRI SHOULDER LT WO CONT INDICATION: Rotator cuff arthropathy left shoulder. Left shoulder pain. No 
trauma. Pain with overhead activity COMPARISON: None TECHNIQUE: Axial proton density fat-saturated; oblique coronal T1, T2 
fat-saturated, and proton density fat-saturated; and oblique sagittal T2 
fat-saturated MRI of the left shoulder . CONTRAST: None. FINDINGS: A.C. joint: There is mild degeneration of the acromioclavicular joint 
with remodeling of the undersurface of the acromion Anterior acromion process 
type: 2 Bone marrow: Humeral head is high riding with flattening of the greater 
tuberosity No acute fracture, dislocation, or marrow replacing process. Joint fluid: None. Rotator cuff tendons: There is a massive full-thickness tear of the 
supraspinatus and infraspinatus. The torn fibers have retracted to the glenoid. Subscapularis and teres minor are intact Biceps tendon: Chronically torn and retracted Muscles: There is severe atrophy of the supraspinatus and infraspinatus with 
mild edema of the infraspinatus Glenoid labrum: Superior labrum is degenerated Glenohumeral joint capsule: within normal limits. Glenohumeral articular cartilage: There is thinning of the cartilage of the 
superior humeral head Soft tissue mass: None. Impression IMPRESSION:  
1. Massive full-thickness tears of the supraspinatus and infraspinatus. Torn 
fibers have retracted to the glenoid with severe atrophy and mild edema of the 
infraspinatus 2. Chronic biceps long head tendon tear with distal retraction 3. Mild degeneration of the glenohumeral joint with high riding humeral head 
abutting the undersurface of the acromion Results from [facility] on 03/21/19 MRI SHOULDER LT WO CONT Narrative EXAM: MRI SHOULDER LT WO CONT INDICATION: Rotator cuff arthropathy left shoulder. Left shoulder pain. No 
trauma. Pain with overhead activity COMPARISON: None TECHNIQUE: Axial proton density fat-saturated; oblique coronal T1, T2 
fat-saturated, and proton density fat-saturated; and oblique sagittal T2 
fat-saturated MRI of the left shoulder . CONTRAST: None. FINDINGS: A.C. joint: There is mild degeneration of the acromioclavicular joint 
with remodeling of the undersurface of the acromion Anterior acromion process 
type: 2 Bone marrow: Humeral head is high riding with flattening of the greater 
tuberosity No acute fracture, dislocation, or marrow replacing process. Joint fluid: None. Rotator cuff tendons: There is a massive full-thickness tear of the 
supraspinatus and infraspinatus. The torn fibers have retracted to the glenoid. Subscapularis and teres minor are intact Biceps tendon: Chronically torn and retracted Muscles: There is severe atrophy of the supraspinatus and infraspinatus with 
mild edema of the infraspinatus Glenoid labrum: Superior labrum is degenerated Glenohumeral joint capsule: within normal limits. Glenohumeral articular cartilage: There is thinning of the cartilage of the 
superior humeral head Soft tissue mass: None. Impression IMPRESSION:  
1. Massive full-thickness tears of the supraspinatus and infraspinatus. Torn 
fibers have retracted to the glenoid with severe atrophy and mild edema of the 
infraspinatus 2. Chronic biceps long head tendon tear with distal retraction 3. Mild degeneration of the glenohumeral joint with high riding humeral head 
abutting the undersurface of the acromion Review of Systems: 
Review of systems not obtained due to patient factors. Vitals:  
 04/01/21 1700 04/01/21 1800 04/01/21 1900 04/01/21 2009 BP: 116/67 112/61 110/63 Pulse: 70 62 63 75 Resp: 22 22 18 29 Temp:      
SpO2: 100% 100% 100% 100% Weight:      
Height:      
 
Objective: I 
 
 
NEUROLOGICAL EXAM: 
 
Appearance: The patient is well developed, well nourished, intubated and not sedated.   
Mental Status: Intubated and not sedated Cranial Nerves:    Visual fields are not testable, fundi are poorly seen, oculocephalics are suppressed but present, corneals are suppressed but present, pupils very miotic but seemingly minimally reactive light, face is symmetric, endotracheal tube in place and hearing is not testable and patient is nonverbal, Neck without meningismus or bruits Temporal arteries are not tender or enlarged TMJ areas are not tender on palpation Motor:   Patient with minimal response to deep pain in all extremities Reflexes:   Deep tendon reflexes 1+/4 and symmetrical. 
No babinski or clonus present Sensory:    Responds only to deep pain or section Gait:  Not testable gait Tremor:   No tremor noted. Cerebellar:  Not testable cerebellar signs present on Romberg and tandem testing and finger-nose-finger exam.  
Neurovascular:  Normal heart sounds and regular rhythm, peripheral pulses decreased, and no carotid bruits. Assessment: ICD-10-CM ICD-9-CM 1. Cardiac arrest (HCC)  I46.9 427.5 Active Problems: 
  Cardiac arrest ([facility] [address] (3/29/2021) Severe anoxic-ischemic encephalopathy ([facility] [address] (3/31/2021) Convulsive syncope (3/31/2021) Acute alteration in mental status (3/31/2021) Plan:  
 
Patient's EEG does show moderate generalized slowing in his CT scan shows no change, recommend some early ischemic changes, and I explained to the wife that this means he has had a moderate severe brain injury, but is going to take some time to more fully appreciate what his level of recovery might be, but it will not be normal, it is just hard to predict whether vegetative state versus minimally responsive state versus encephalopathic state is his future. Patient with prolonged cardiac resuscitation and downtime 30 to 60 minutes by the intensivist note Patient appears to have severe anoxic brain injury, but clearly has some function in all of his brainstem function it looks like, and is on suctioning and painful stimuli he does have some movement. Explained to the wife that the patient has probable moderately severe anoxic brain injury, and will probably be severely injured, with a level of his disability cannot be accurately predicted so far we will just have to follow him for his clinical findings with his test results with clinical correlation. May need to consider referral somebody CODE STATUS because he already has severe disability related to this and anoxic brain injury. We will follow carefully with you. Signed By: Felipe Jean MD   
 April 1, 2021 CC: Kelly Taylor MD 
FAX: [phone]

## 2021-04-02 NOTE — PROGRESS NOTES
Progress Note 4/2/2021 8:30 AM 
NAME: Ambrosio Kohler MRN:  [id number] Admit Diagnosis: Cardiac arrest ([facility] [address] [I46.9] Assessment:  
 
Out of hospital cardiac arrest and resuscitation     PEA initial , followed by V Fib and shock to rhythm and ROSC . Cardiogenic shock . On Levophed and EPI . Intubated, on ventilator. EKG shows sinus rhythm , NSSTT changes. No findings of STEMI .  
  
Recent office visit to f/u Holter which showed multiple runs of NSVT  . Patient has previously declined ICD and again at recent office visit declined ICD. This could well have been a primary arrhythmic arrest . Cannot exclude  Vein graft occlusion .  
  
Chronic EF 20 %.   
  
Cath in 2009 showed severe 3 vessel disease and he underwent CABG  X 4. LIMA to LAD . SVG to RCA and PDA. SVG to Marginal.  
 At that time his EF was 15% . 
  
S/P TAVR for Aortic Stenosis . 2017 Bilateral Carotid ASO Ischemic Cardiomyopathy . Chronic systolic heart failure. Hyperlipidemia. PAD with previous peripheral interventions. Acute renal failure. DNR  
 
3/30  Sinus bradycardia overnight and Amiodarone was stopped. He is requiring higher doses of EPI and LEVO to support BP Creatinine up . Lactic acid elevated. Troponin up to 50 . Echo showed EF 15% at best .   4 chamber dilated heart . I have discussed with his wife here. 3/31  Day 2 : He remains comatose on ventilator. He is not on any sedative and has minimal brainstem reflex response He has spiked a fever to 103. Likely a central fever but need to r/o infectious source such as Aspiration pneumonia . He is on antibiotic He is now off [place] , remains on some EPI . Remains anuric . K+ is better after treatment yesterday Was acidotic earlier this AM and had 1 unit of Bicarb. Rhythm has been stable. 4/1 remains comatose on ventilator.   He has some reflex responses,  Eyelids twitch to touch, some eyelid movement in response to sternal rub. Pupils small , fixed. CXR showed pulmonary edema . Still only minimal urine output, though creatinine down a little. Epi dose has come down to 6 mcgs now He's been afebrile since the spike yesterday AM . EEG result pending . CT of head 3/31 showed no change. 4/2  Seems about the same . Remains comatose. Minimal reflexive response to stimulus. Has had good urine output . Creatinine a little better again CXR reviewed  Pretty reasonable . Some mild edema , no infiltrate. WBC elevated. Afebrile. Rhythm stable,   Sinus with PVCs. Still on some low dose Epi . Usual cardiologist is Dr Jenna Castelan . Plan:  
 
Poor prognosis but stable . Just wait and see. Continue support Continue diuretic Wife at the bedside and lengthy discussion with her explaining current status and plans. Limited Prognosis . Advised her that family members should come if possible if they would like to see him before he passes. Cannot predict time course but prognosis remains poor. [x]        High complexity decision making was performed Subjective:  
 
[place] [address] on ventilator. Discussed with RN events overnight. Patient Active Problem List  
Diagnosis Code  Coronary artery disease involving native coronary artery of native heart without angina pectoris I25.10  
 S/P TAVR (transcatheter aortic valve replacement) Z95.2  Cardiac arrest ([facility] [address] I46.9  Severe anoxic-ischemic encephalopathy (HCC) G93.1, I67.82  
 Convulsive syncope R55  Acute alteration in mental status R41.82 Review of Systems: 
 
Symptom Y/N Comments  Symptom Y/N Comments Fever/Chills N   Chest Pain N Poor Appetite N   Edema N   
Cough N   Abdominal Pain N Sputum N   Joint Pain N   
SOB/CALHOUN N   Pruritis/Rash N   
Nausea/vomit N   Tolerating PT/OT Y Diarrhea N   Tolerating Diet Y Constipation N   Other Could NOT obtain due to:   
 
Objective:  
  
Physical Exam: 
 
Last 24hrs VS reviewed since prior progress note. Most recent are: 
 
Visit Vitals /64 Pulse 76 Temp 98.5 °F (36.9 °C) Resp 18 Ht 5' 11\" (1.803 m) Wt 73.6 kg (162 lb 4.1 oz) SpO2 100% BMI 22.63 kg/m² Intake/Output Summary (Last 24 hours) at 4/2/2021 5216 Last data filed at 4/2/2021 0800 Gross per 24 hour Intake 1865.72 ml Output 5325 ml Net -3459.28 ml General Appearance: Well developed, well nourished, alert & oriented x 3,  
 no acute distress. Ears/Nose/Mouth/Throat: Hearing grossly normal. 
Neck: Supple. Chest: Lungs clear to auscultation bilaterally. Cardiovascular: Regular rate and rhythm, S1S2 normal, no murmur. Abdomen: Soft, non-tender, bowel sounds are active. Extremities: No edema bilaterally. Skin: Warm and dry. PMH/SH reviewed - no change compared to H&P Data Review Telemetry: normal sinus rhythm Lab Data Personally Reviewed: 
 
Recent Labs 04/02/21 
0434 04/01/21 
0320 WBC 15.1* 11.8* HGB 10.9* 10.8* HCT 33.8* 33.7*  
* 99* LABRCNT(INR:3,PTP:3,APTT:3,) Recent Labs 04/02/21 
8410 04/01/21 
0320 03/31/21 
0404  143 140  
K 3.6 4.1 5.2*  
* 117* 116* CO2 22 17* 13*  
BUN 47* 45* 41* CREA 2.46* 2.71* 3.05* * 128* 121* CA 7.7* 7.3* 7.1*  
MG 1.8 1.9 1.9 LABRCNT(CPK:3,CpKMB:3,ckndx:3,troiq:3) Lab Results Component Value Date/Time Cholesterol, total 131 05/17/2018 10:06 AM  
 HDL Cholesterol 40 05/17/2018 10:06 AM  
 LDL, calculated 64 05/17/2018 10:06 AM  
 Triglyceride 134 05/17/2018 10:06 AM  
 CHOL/HDL Ratio 3.8 04/15/2010 12:11 PM  
LABRCNT(sgot:3,gpt:3,ap:3,tbiL:3,TP:3,ALB:3,GLOB:3,ggt:3,aml:3,amyp:3,lpse:3,hlpse:3) Recent Labs  
  03/31/21 
1113 03/31/21 
0455 PH 7.27* 7.19* PCO2 28* 36  
PO2 74* 35* Lab Results Component Value Date/Time  Cholesterol, total 131 05/17/2018 10:06 AM  
 HDL Cholesterol 40 05/17/2018 10:06 AM  
 LDL, calculated 64 05/17/2018 10:06 AM  
 Triglyceride 134 05/17/2018 10:06 AM  
 CHOL/HDL Ratio 3.8 04/15/2010 12:11 PM  
MEDTABLERoque Alexandre Se, MD 
Recent Labs  
  03/31/21 
1113 03/31/21 
0455 PH 7.27* 7.19* PCO2 28* 36  
PO2 74* 35* Medications Personally Reviewed: 
 
Current Facility-Administered Medications Medication Dose Route Frequency  piperacillin-tazobactam (ZOSYN) 3.375 g in 0.9% sodium chloride (MBP/ADV) 100 mL MBP  3.375 g IntraVENous Q8H  
 furosemide (LASIX) injection 80 mg  80 mg IntraVENous DAILY  acetaminophen (TYLENOL) solution 650 mg  650 mg Per G Tube Q4H PRN  
 0.45% sodium chloride infusion  50 mL/hr IntraVENous CONTINUOUS  
 heparin (porcine) injection 5,000 Units  5,000 Units SubCUTAneous Q8H  
 fentaNYL citrate (PF) injection 100 mcg  100 mcg IntraVENous Q1H PRN  
 fentaNYL (PF) 1,500 mcg/30 mL (50 mcg/mL) infusion  0-200 mcg/hr IntraVENous TITRATE  
 NOREPINephrine (LEVOPHED) 8 mg in 5% dextrose 250mL (32 mcg/mL) infusion  0.5-30 mcg/min IntraVENous TITRATE  chlorhexidine (ORAL CARE KIT) 0.12 % mouthwash 15 mL  15 mL Oral Q12H  EPINEPHrine (ADRENALIN) 5 mg in 0.9% sodium chloride 250 mL infusion  1-20 mcg/min IntraVENous TITRATE  sodium chloride (NS) flush 5-40 mL  5-40 mL IntraVENous Q8H  
 sodium chloride (NS) flush 5-40 mL  5-40 mL IntraVENous PRN  
 acetaminophen (TYLENOL) suppository 650 mg  650 mg Rectal Q6H PRN  polyethylene glycol (MIRALAX) packet 17 g  17 g Oral DAILY PRN  promethazine (PHENERGAN) tablet 12.5 mg  12.5 mg Oral Q6H PRN Or  
 ondansetron (ZOFRAN) injection 4 mg  4 mg IntraVENous Q6H PRN  potassium chloride 10 mEq in 100 ml IVPB  10 mEq IntraVENous PRN  
 magnesium sulfate 2 g/50 ml IVPB (premix or compounded)  2 g IntraVENous PRN  
 famotidine (PF) (PEPCID) 20 mg in 0.9% sodium chloride 10 mL injection  20 mg IntraVENous DAILY  alcohol 62% (NOZIN) nasal  1 Ampule  1 Ampule Topical Q12H  
 balsam peru-castor oiL (VENELEX) ointment   Topical BID Tiara Bundy MD

## 2021-04-02 NOTE — PROGRESS NOTES
No particular changes this afternoon . He does continue to diurese vigorously so has continued organ perfusion and improving renal function . No change in CNS status Talked with his wife again, answered questions , she had talked with palliative earlier. Agree she is holding out hope , but I tried to reiterate that the odds are very long and what we are doing now is basically just keeping him alive and providing support while we wait to see if he is going to recover any level of Neuro function . Dr Berna Ma in for us over the weekend .

## 2021-04-02 NOTE — PROGRESS NOTES
04/02/21 4451 ABCDEF Bundle SBT Safety Screen Passed No  
SBT Screen Reason for Failure Vasopressor use

## 2021-04-02 NOTE — PROGRESS NOTES
[facility] 
 
ICU Intensivist- Critical Care Progress Note Name: Norva Runner : 1940 MRN: [id number] Admit: 3/29/2021 11:25 AM   
 
Diagnosis:  
 
Principal Problem: 
  Severe anoxic-ischemic encephalopathy ([address] Problem List: 
2021: Severe anoxic-ischemic encephalopathy ([address] 2021: Convulsive syncope 2021: Acute alteration in mental status 2021: Cardiac arrest ([address] 2018: Coronary artery disease involving native coronary artery of  
native heart without angina pectoris 2018: S/P TAVR (transcatheter aortic valve replacement) KELLY (acute kidney injury) ([address] Ischemic cardiomyopathy Acute respiratory failure with hypoxia ([address] ICU Comprehensive Plan of Care:  
 
Plans for this Shift:  
1. Anoxic brain encephalopathy after cardiac arrest. 
 
2. Acute respiratory failure with hypoxia 3. Ischemic cardiomyopathy Subjective:  
 
Progress Note:  
2021  
1:03 AM  
 
Reason for ICU Admission:  
Severe anoxic-ischemic encephalopathy ([address] Overnight Events:  
Decerebrate posturing Past Medical History:  
 
 has a past medical history of Aortic stenosis, CAD (coronary artery disease) (), Carotid stenosis, Heart failure ([address] and Hypertension. Past Surgical History:  
 
 has a past surgical history that includes pr cabg, artery-vein, four (); pr cardiac surg procedure unlist (2017); vascular surgery procedure unlist (); and hx hernia repair (). Current Medications:  
 
Current Facility-Administered Medications Medication Dose Route Frequency  piperacillin-tazobactam (ZOSYN) 3.375 g in 0.9% sodium chloride (MBP/ADV) 100 mL MBP  3.375 g IntraVENous Q8H  
 furosemide (LASIX) injection 80 mg  80 mg IntraVENous DAILY  acetaminophen (TYLENOL) solution 650 mg  650 mg Per G Tube Q4H PRN  
 0.45% sodium chloride infusion  50 mL/hr IntraVENous CONTINUOUS  
 heparin (porcine) injection 5,000 Units  5,000 Units SubCUTAneous Q8H  
 fentaNYL citrate (PF) injection 100 mcg  100 mcg IntraVENous Q1H PRN  
 fentaNYL (PF) 1,500 mcg/30 mL (50 mcg/mL) infusion  0-200 mcg/hr IntraVENous TITRATE  
 NOREPINephrine (LEVOPHED) 8 mg in 5% dextrose 250mL (32 mcg/mL) infusion  0.5-30 mcg/min IntraVENous TITRATE  chlorhexidine (ORAL CARE KIT) 0.12 % mouthwash 15 mL  15 mL Oral Q12H  EPINEPHrine (ADRENALIN) 5 mg in 0.9% sodium chloride 250 mL infusion  1-20 mcg/min IntraVENous TITRATE  sodium chloride (NS) flush 5-40 mL  5-40 mL IntraVENous Q8H  
 sodium chloride (NS) flush 5-40 mL  5-40 mL IntraVENous PRN  
 acetaminophen (TYLENOL) suppository 650 mg  650 mg Rectal Q6H PRN  polyethylene glycol (MIRALAX) packet 17 g  17 g Oral DAILY PRN  promethazine (PHENERGAN) tablet 12.5 mg  12.5 mg Oral Q6H PRN Or  
 ondansetron (ZOFRAN) injection 4 mg  4 mg IntraVENous Q6H PRN  potassium chloride 10 mEq in 100 ml IVPB  10 mEq IntraVENous PRN  
 magnesium sulfate 2 g/50 ml IVPB (premix or compounded)  2 g IntraVENous PRN  
 famotidine (PF) (PEPCID) 20 mg in 0.9% sodium chloride 10 mL injection  20 mg IntraVENous DAILY  alcohol 62% (NOZIN) nasal  1 Ampule  1 Ampule Topical Q12H  
 balsam peru-castor oiL (VENELEX) ointment   Topical BID Allergies/Social/Family History: Allergies Allergen Reactions  Niacin Other (comments) Severe flushing Social History Tobacco Use  Smoking status: Never Smoker  Smokeless tobacco: Never Used Substance Use Topics  Alcohol use: Yes Comment: socially Family History Problem Relation Age of Onset  Sudden Death Sister   
     cause unknown  Lung Disease Mother  Hypertension Father Review of Systems:  
 
Review of systems not obtained due to patient factors. Objective:  
Vital Signs: 
Visit Vitals /64 Pulse 66 Temp 99.4 °F (37.4 °C) Resp 18 Ht 5' 11\" (1.803 m) Wt 73.6 kg (162 lb 4.1 oz) SpO2 99% BMI 22.63 kg/m² O2 Device: Endotracheal tube Temp (24hrs), Av.1 °F (37.3 °C), Min:98.7 °F (37.1 °C), Max:99.4 °F (37.4 °C) Intake/Output:  
 
Intake/Output Summary (Last 24 hours) at 2021 0103 Last data filed at 2021 2300 Gross per 24 hour Intake 2353.27 ml Output 4110 ml Net -1756.73 ml Physical Exam: 
General:   Comatose, no distress, appears stated age. Eyes:   Conjunctivae/corneas clear. PERRL, EOMs intact. Ears:   Normal external ear canals bilaterally. Nose:   No drainage or sinus tenderness. Mouth/Throat:  Moist mucous membranes. Dentition normal.   
Neck:  Symmetrical, trachea midline, no JVD or carotid bruit. Back:    No CVA tenderness to percussion. Lungs:    Clear to auscultation bilaterally. Heart:   Regular rate and rhythm. S1, S2 normal, without murmur, click, rub or gallop. Abdomen:    Normoactive bowel sounds. Soft, non-tender, no masses or organomegaly. Extremities:  Atraumatic, no cyanosis or edema. Vascular:  Pulses 2+ and symmetric UE/LE bilat Skin:  Normal color, non-diaphoretic, normal capillary refill (Less than 2 seconds). No rashes or lesions. Lymph nodes:  No Lymphadenopathy. Neurologic:  Comatose with decerebrate posturing LABS AND  DATA: Personally reviewed Recent Labs 21 
0320 21 
0404 WBC 11.8* 15.6* HGB 10.8* 11.8* HCT 33.7* 38.1 PLT 99* 147* Recent Labs 21 
0320 21 
0404  140  
K 4.1 5.2*  
* 116* CO2 17* 13*  
BUN 45* 41* CREA 2.71* 3.05* * 121* CA 7.3* 7.1*  
MG 1.9 1.9 PHOS 4.3 5.1* No results for input(s): AP, TBIL, TP, ALB, GLOB, AML, LPSE in the last 72 hours. No lab exists for component: SGOT, GPT, AMYP No results for input(s): INR, PTP, APTT, INREXT in the last 72 hours. No results for input(s): PHI, PCO2I, PO2I, FIO2I in the last 72 hours. Recent Labs  
  21 
7463 TROIQ 54.20* Ventilator Settings: Mode Rate Tidal Volume Pressure FiO2 PEEP Assist control   400 ml    40 % 5 cm H20 Peak airway pressure: 24 cm H2O Minute ventilation: 13 l/min MEDS: Reviewed RADIOLOGY: 
No results found. Assessment:  
 
Hospital Problems  Date Reviewed: 9/3/2019 Codes Class Noted POA Severe anoxic-ischemic encephalopathy (HCC) ICD-10-CM: G93.1, I67.82 
ICD-9-CM: 348.1, 437.1  3/31/2021 Yes Convulsive syncope ICD-10-CM: R55 
ICD-9-CM: 780.2, 780.39  3/31/2021 Unknown Acute alteration in mental status ICD-10-CM: R41.82 
ICD-9-CM: 780.97  3/31/2021 Yes Cardiac arrest [facility]) ICD-10-CM: I46.9 ICD-9-CM: 427.5  3/29/2021 Unknown DISPOSITION Stay in ICU CRITICAL CARE CONSULTANT NOTE I provided a face-to-face bedside physician/patient encounter, greater than the usual and customary amount normally needed, due to the high complexity of medical decision-making required. I reviewed and interpreted patient data including clinical events, labs, images, vital signs, I/O's, and examined patient. I have actively participated in multi-disciplinary discussions (Cardiology, Neurology, Radiology, CCU Nursing staff) regarding the case in formulating an optimal therapeutic plan, and effecting a management strategy for this patient. NOTE OF PERSONAL INVOLVEMENT IN CARE This patient has a high probability of imminent, clinically significant deterioration, which requires the highest level of preparedness to intervene urgently. I participated in the decision-making and personally managed, or directed the management of, a myriad of life and organ supporting interventions which required my frequent-interval clinical reassessments, in order to treat or prevent imminent deterioration. I personally spent 35 minutes of critical care time.   This is time spent at this critically ill patient's bedside actively involved in patient care as well as the coordination of care and discussions with the patient's family. This does not include any procedural time which has been billed separately. Yobani Valdes MD, FACS Staff AUGUST/ [address] 4/2/2021

## 2021-04-03 NOTE — PROGRESS NOTES
0700: [facility]  
 
0800: Shift assessment completed. Now noted to have some decorticate posturing in BLE and RUE compared to yesterday's assessment. 0900: Dr. Radha Riley at bedside to speak with patient's wife. 1000: Dr. Abdirahman Espinosa at bedside to see patient. 1140: Epi drip stopped; will monitor. 1200: Reassessment. No changes to previous assessment. 1430: Epi drip restarted at 2mcg for hypotension, SBP in 70s.  
 
1600: Reassessment. No changes to previous. 1900: Bedside and Verbal shift change report given to Yefri Daniels RN (oncoming nurse) by Zafar Sarmiento RN (offgoing nurse). Report included the following information SBAR, Kardex, Intake/Output, MAR, Accordion, Recent Results and Cardiac Rhythm SR with 1st degree AVB, BBB.

## 2021-04-03 NOTE — PROGRESS NOTES
04/03/21 7927 ABCDEF Bundle SBT Safety Screen Passed No  
SBT Screen Reason for Failure Vasopressor use

## 2021-04-03 NOTE — PROGRESS NOTES
Cardiology Progress Note 4/3/2021    Admit Date: 3/29/2021 Admit Diagnosis: Cardiac arrest ([facility] [address] [I46.9]  CC:  None currently Assessment (per Dr Bibi Mei):   
  
Out of hospital cardiac arrest and resuscitation     PEA initial , followed by V Fib and shock to rhythm and ROSC .   
Cardiogenic shock .  On Levophed and EPI . Intubated, on ventilator. EKG shows sinus rhythm , NSSTT changes.   No findings of STEMI .  
  
Recent office visit to f/u Holter which showed multiple runs of NSVT  .  Patient has previously declined ICD and again at recent office visit declined ICD. This could well have been a primary arrhythmic arrest . Cannot exclude  Vein graft occlusion .  
  
Chronic EF 20 %.    
  
  
Cath in 2009 showed severe 3 vessel disease and he underwent CABG  X 4.  
    LIMA to LAD .   SVG to RCA and PDA.    SVG to Marginal.  
 At that time his EF was 15% . 
  
S/P TAVR for Aortic Stenosis .  2017   
    
Bilateral Carotid ASO Ischemic Cardiomyopathy . Chronic systolic heart failure. Hyperlipidemia. PAD with previous peripheral interventions.   
Acute renal failure. DNR  
  
3/30  Sinus bradycardia overnight and Amiodarone was stopped. He is requiring higher doses of EPI and LEVO to support BP Creatinine up . Lactic acid elevated. Troponin up to 50 . Echo showed EF 15% at best .   4 chamber dilated heart . I have discussed with his wife here.  
  
3/31  Day 2 : He remains comatose on ventilator. He is not on any sedative and has minimal brainstem reflex response He has spiked a fever to 103. Likely a central fever but need to r/o infectious source such as Aspiration pneumonia . He is on antibiotic He is now off [place] , remains on some EPI . Remains anuric . K+ is better after treatment yesterday Was acidotic earlier this AM and had 1 unit of Bicarb. Rhythm has been stable.  
  
4/1 remains comatose on ventilator.   He has some reflex responses,  Eyelids twitch to touch, some eyelid movement in response to sternal rub. Pupils small , fixed. CXR showed pulmonary edema . Still only minimal urine output, though creatinine down a little. Epi dose has come down to 6 mcgs now He's been afebrile since the spike yesterday AM . EEG result pending . CT of head 3/31 showed no change.  
  
4/2  Seems about the same . Remains comatose. Minimal reflexive response to stimulus. Has had good urine output . Creatinine a little better again CXR reviewed  Pretty reasonable . Some mild edema , no infiltrate. WBC elevated. Afebrile. Rhythm stable,   Sinus with PVCs. Still on some low dose Epi .  
  
Usual cardiologist is Dr Ritesh Heath (per Dr Mahamed Schilling):    
  
Poor prognosis but stable . Just wait and see. Continue support 
  
Continue diuretic  
  
Wife at the bedside and lengthy discussion with her explaining current status and plans. Limited Prognosis . Advised her that family members should come if possible if they would like to see him before he passes. Cannot predict time course but prognosis remains poor. 4/3: BPs 110-120s; HR 60-80s; NSVT on tele. 100% on vent (40%; PEEP 5). No CXR. WBC 19; Hg 11.5; plt 113. Na 146; K 3; Cr 2 from 2.46; Ca 7.5. Cardiac meds; lasix 40 IV qday; NS @ 50. Epi @ 2 (from 4). K Rx by CC. No new cardiac recs; weaning pressors as able. D/w family member @ bedside. For other plans, see orders. High complexity decision making was performed  X Yes High-risk of decompensation with multiple organ involvement X Yes Hospital problem list  
Active Hospital Problems Diagnosis Date Noted  DNR (do not resuscitate) discussion  Goals of care, counseling/discussion  Unresponsive  KELLY (acute kidney injury) ([facility]  Ischemic cardiomyopathy  Acute respiratory failure with hypoxia (HCC)  Severe anoxic-ischemic encephalopathy ([facility] 2021  Convulsive syncope 2021  Acute alteration in mental status 2021  Cardiac arrest ([facility] 2021 Subjective:  Patient reports  [x]   nothing; unable to communicate    [x]   intubated Chest pain  none  consistent with:  Non-cardiac CP Atypical CP None now  On going  Anginal CP Dyspnea  none  at rest  with exertion    
    improved  unchanged  worse PND  none  overnight Orthopnea  none  improved  unchanged  worse Presyncope  none  improved  unchanged  worse Ambulated in hallway without symptoms   Yes Ambulated in room without symptoms  Yes ROS Hematuria:  Yes X No Dysuria:  Yes X No  
            
(2+  other systems) Cough: Yes X No Sputum: Yes X No  
            
 BRBPR:  Yes X No Melena: Yes X No  
No change in family and social history from H&P/Consult note. Objective:  
 Physical Exam: 
24 hr VS reviewed, overall VSSAF Temp (24hrs), Av.9 °F (37.2 °C), Min:98.6 °F (37 °C), Max:99.6 °F (37.6 °C) Patient Vitals for the past 8 hrs: 
 Pulse 21 0800 66  
21 0737 81  
21 0700 63  
21 0600 69  
21 0500 67  
21 0400 69  
21 0311 68  
21 0300 73  
21 0200 67 Patient Vitals for the past 8 hrs: 
 Resp  
21 0800 14  
21 0737 30  
21 0700 16  
21 0600 17  
21 0500 16  
21 0400 15  
21 0311 25  
21 0300 16  
21 0200 17 Patient Vitals for the past 8 hrs: 
 BP  
21 0800 116/66  
21 0700 121/68  
21 0600 122/72  
21 0500 129/65  
21 0400 125/72  
21 0300 129/75  
21 0200 113/69 Intake/Output Summary (Last 24 hours) at 4/3/2021 3800 Last data filed at 4/3/2021 0800 Gross per 24 hour Intake 1582.1 ml Output 4535 ml Net -2952.9 ml  
 
General:  WD,WN  Elderly  Cachetic x NAD   Agitated Lethargic  Arousable  Obtunded  
 x Sedated  On Bipap x Intubated ENT/Palate: WNL  Dry MM  anicteric  ETT in place Respiratory: X CTA ant.lat  Nl resp effort  Increased effort  No significant change  
  rhonchi  rales  improved  worse Cardiovasc: X RRR  IRRR X Nl S1, S2 x No rub No murmur X No new murmur  Murmur c/w: x No gallop  
 x No edema  BLE edema:+  RLE edema:+  LLE edema:+ Edema less  Edema more  Edema same  Edema worse X Nl JVP  Elevated JVP  JVP same  JVP worse X Carotid wnl x abd aorta not palpated X no peripheral emboli noted GI: X abd soft x nondistended X BS present X No organo- megaly noted Skin: X Warm, dry  Cold extremites Neuro:  A/O  Grossly non- focal  Obtunded x Sedated Lethargic  Arousable x intubated    
 
cath site intact w/o hematoma or new bruit; distal pulse unchanged  Yes Data Review:    
Telemetry independently reviewed x sinus  chronic afib  parox afib x NSVT  
 
ECG independently reviewed  NSR  afib  no significant changes  NSST-Tw chgs  
x no new ECG provided for review Lab results reviewed as noted below. Current medications reviewed as noted below. Recent Labs  
  03/31/21 
1113 PH 7.27* PCO2 28* PO2 74* No results for input(s): CPK, CKMB, CKNDX, TROIQ in the last 72 hours. Recent Labs 04/03/21 
7355 04/02/21 
8346 04/01/21 
0320 * 145 143  
K 3.0* 3.6 4.1 * 114* 117* CO2 25 22 17* BUN 51* 47* 45* CREA 2.04* 2.46* 2.71* GFRAA 38* 31* 28* * 113* 128* PHOS 3.6 3.8 4.3 CA 7.5* 7.7* 7.3* WBC 19.1* 15.1* 11.8* HGB 11.5* 10.9* 10.8* HCT 35.2* 33.8* 33.7*  
* 113* 99* No results for input(s): AP, TBIL, TBILI, TP, ALB, GLOB, GGT, AML, LPSE in the last 72 hours. No lab exists for component: SGOT, GPT, AMYP, HLPSE No results for input(s): INR, PTP, APTT, INREXT in the last 72 hours.   
No results for input(s): FE, TIBC, PSAT, FERR in the last 72 hours. Lab Results Component Value Date/Time Glucose (POC) 226 (H) 03/29/2021 11:29 AM  
 Glucose (POC) 116 (H) 01/19/2009 09:34 PM  
 Glucose (POC) 111 (H) 01/19/2009 04:33 PM  
 Glucose (POC) 115 (H) 01/19/2009 12:07 PM  
 Glucose (POC) 106 01/19/2009 08:27 AM  
 
 
Current Facility-Administered Medications Medication Dose Route Frequency  potassium chloride 10 mEq in 100 ml IVPB  10 mEq IntraVENous Q1H  
 piperacillin-tazobactam (ZOSYN) 3.375 g in 0.9% sodium chloride (MBP/ADV) 100 mL MBP  3.375 g IntraVENous Q8H  
 furosemide (LASIX) injection 80 mg  80 mg IntraVENous DAILY  acetaminophen (TYLENOL) solution 650 mg  650 mg Per G Tube Q4H PRN  
 0.45% sodium chloride infusion  50 mL/hr IntraVENous CONTINUOUS  
 heparin (porcine) injection 5,000 Units  5,000 Units SubCUTAneous Q8H  
 fentaNYL citrate (PF) injection 100 mcg  100 mcg IntraVENous Q1H PRN  
 fentaNYL (PF) 1,500 mcg/30 mL (50 mcg/mL) infusion  0-200 mcg/hr IntraVENous TITRATE  chlorhexidine (ORAL CARE KIT) 0.12 % mouthwash 15 mL  15 mL Oral Q12H  EPINEPHrine (ADRENALIN) 5 mg in 0.9% sodium chloride 250 mL infusion  1-20 mcg/min IntraVENous TITRATE  sodium chloride (NS) flush 5-40 mL  5-40 mL IntraVENous Q8H  
 sodium chloride (NS) flush 5-40 mL  5-40 mL IntraVENous PRN  
 acetaminophen (TYLENOL) suppository 650 mg  650 mg Rectal Q6H PRN  polyethylene glycol (MIRALAX) packet 17 g  17 g Oral DAILY PRN  promethazine (PHENERGAN) tablet 12.5 mg  12.5 mg Oral Q6H PRN Or  
 ondansetron (ZOFRAN) injection 4 mg  4 mg IntraVENous Q6H PRN  potassium chloride 10 mEq in 100 ml IVPB  10 mEq IntraVENous PRN  
 magnesium sulfate 2 g/50 ml IVPB (premix or compounded)  2 g IntraVENous PRN  
 famotidine (PF) (PEPCID) 20 mg in 0.9% sodium chloride 10 mL injection  20 mg IntraVENous DAILY  alcohol 62% (NOZIN) nasal  1 Ampule  1 Ampule Topical Q12H  
 balsam peru-castor oiL (VENELEX) ointment   Topical BID Al Hernandez MD

## 2021-04-03 NOTE — PROGRESS NOTES
Chief Complaint: comatose 79year old male s/p cardiac arrest with prolonged code approximately 5 days ago currently ventilated and intubated. Wife is by his side. Discussed plan. Assesment and Plan 1. Cardiac arrest ([facility] On vent. Plan to check CT and EEG Monday for prognostication. 2. Anoxic-ischemic encephalopathy ([facility] Prognosis guarded Allergies Niacin Medications Current Facility-Administered Medications Medication Dose Route Frequency  [START ON 4/4/2021] furosemide (LASIX) injection 40 mg  40 mg IntraVENous DAILY  dextrose 5% - 0.45% NaCl with KCl 40 mEq/L infusion   IntraVENous CONTINUOUS  piperacillin-tazobactam (ZOSYN) 3.375 g in 0.9% sodium chloride (MBP/ADV) 100 mL MBP  3.375 g IntraVENous Q8H  
 acetaminophen (TYLENOL) solution 650 mg  650 mg Per G Tube Q4H PRN  
 heparin (porcine) injection 5,000 Units  5,000 Units SubCUTAneous Q8H  
 fentaNYL citrate (PF) injection 100 mcg  100 mcg IntraVENous Q1H PRN  
 fentaNYL (PF) 1,500 mcg/30 mL (50 mcg/mL) infusion  0-200 mcg/hr IntraVENous TITRATE  chlorhexidine (ORAL CARE KIT) 0.12 % mouthwash 15 mL  15 mL Oral Q12H  EPINEPHrine (ADRENALIN) 5 mg in 0.9% sodium chloride 250 mL infusion  1-20 mcg/min IntraVENous TITRATE  sodium chloride (NS) flush 5-40 mL  5-40 mL IntraVENous Q8H  
 sodium chloride (NS) flush 5-40 mL  5-40 mL IntraVENous PRN  
 acetaminophen (TYLENOL) suppository 650 mg  650 mg Rectal Q6H PRN  polyethylene glycol (MIRALAX) packet 17 g  17 g Oral DAILY PRN  promethazine (PHENERGAN) tablet 12.5 mg  12.5 mg Oral Q6H PRN Or  
 ondansetron (ZOFRAN) injection 4 mg  4 mg IntraVENous Q6H PRN  potassium chloride 10 mEq in 100 ml IVPB  10 mEq IntraVENous PRN  
 magnesium sulfate 2 g/50 ml IVPB (premix or compounded)  2 g IntraVENous PRN  
 famotidine (PF) (PEPCID) 20 mg in 0.9% sodium chloride 10 mL injection  20 mg IntraVENous DAILY  alcohol 62% (NOZIN) nasal  1 Ampule 1 Ampule Topical Q12H  
 balsam peru-castor oiL (VENELEX) ointment   Topical BID Medical History Past Medical History:  
Diagnosis Date  Aortic stenosis  CAD (coronary artery disease) 1989 MI  
 Carotid stenosis   
 mild bilateral  
 Heart failure ([address] ischemic cardiomyopathy  Hypertension Exam: 
 
Visit Vitals BP 93/60 Pulse 65 Temp 99 °F (37.2 °C) Resp 28 Ht 5' 11\" (1.803 m) Wt 162 lb 4.1 oz (73.6 kg) SpO2 100% BMI 22.63 kg/m² General: intubated Head: Normocephalic, atraumatic, anicteric sclera Neck Normal ROM,  no thyromegally Lungs:  Clear to auscultation bilaterally, No wheezes or rubs Cardiac: Regular rate and rhythm . Abd: Bowel sounds were audible. Ext: No pedal edema Skin: Supple no rash NeurologicExam: 
Mental Status: comatose Speech: Non verbal  
Cranial Nerves:  opens eyes spontaneously 
does not respond to sound 
dolls eyes intact PERRL 
corneal reflex intact 
symmetric grimace Weak gag reflex Motor:  Weak withdraw Reflexes:   Deep tendon reflexes 1+/4 and symmetric. Tremor:   No tremor noted. Imaging CT Results (most recent): 
[place] [facility] [place] HEAD WO CONT Narrative INDICATION: Cardiac arrest, no neuro improvment EXAM:  HEAD CT WITHOUT CONTRAST 
 
COMPARISON: March 29, 2021 TECHNIQUE:  Routine noncontrast axial head CT was performed. Sagittal and 
coronal reconstructions were generated. CT dose reduction was achieved through use of a standardized protocol tailored 
for this examination and automatic exposure control for dose modulation. FINDINGS: 
 
Ventricles: Midline, no hydrocephalus. Intracranial Hemorrhage: None. Brain Parenchyma/Brainstem:  There may be very subtle loss of gray-white 
differentiation throughout the supratentorial brain, though similar to prior 
examination, and may be artifactual. There is no cerebral edema/sulcal effacement. Basal Cisterns: Normal. 
Paranasal Sinuses: Visualized sinuses are clear. Additional Comments: N/A. Impression Possible subtle gray-white differentiation loss, but similar to prior study and 
thus may be artifactual. No evidence of cerebral edema, hydrocephalus, or acute 
hemorrhage. If persistent clinical concern for anoxic brain injury, recommend 
MRI. MRI Results (most recent): 
Results from Hospital Encounter encounter on 03/21/19 MRI SHOULDER LT WO CONT Narrative EXAM: MRI SHOULDER LT WO CONT INDICATION: Rotator cuff arthropathy left shoulder. Left shoulder pain. No 
trauma. Pain with overhead activity COMPARISON: None TECHNIQUE: Axial proton density fat-saturated; oblique coronal T1, T2 
fat-saturated, and proton density fat-saturated; and oblique sagittal T2 
fat-saturated MRI of the left shoulder . CONTRAST: None. FINDINGS: A.C. joint: There is mild degeneration of the acromioclavicular joint 
with remodeling of the undersurface of the acromion Anterior acromion process 
type: 2 Bone marrow: Humeral head is high riding with flattening of the greater 
tuberosity No acute fracture, dislocation, or marrow replacing process. Joint fluid: None. Rotator cuff tendons: There is a massive full-thickness tear of the 
supraspinatus and infraspinatus. The torn fibers have retracted to the glenoid. Subscapularis and teres minor are intact Biceps tendon: Chronically torn and retracted Muscles: There is severe atrophy of the supraspinatus and infraspinatus with 
mild edema of the infraspinatus Glenoid labrum: Superior labrum is degenerated Glenohumeral joint capsule: within normal limits. Glenohumeral articular cartilage: There is thinning of the cartilage of the 
superior humeral head Soft tissue mass: None. Impression IMPRESSION:  
1. Massive full-thickness tears of the supraspinatus and infraspinatus.  Torn 
fibers have retracted to the glenoid with severe atrophy and mild edema of the 
infraspinatus 2. Chronic biceps long head tendon tear with distal retraction 3. Mild degeneration of the glenohumeral joint with high riding humeral head 
abutting the undersurface of the acromion [facility] Lab Review Recent Results (from the past 24 hour(s)) CBC WITH AUTOMATED DIFF Collection Time: 04/03/21  4:17 AM  
Result Value Ref Range WBC 19.1 (H) 4.1 - 11.1 K/uL  
 RBC 3.72 (L) 4.10 - 5.70 M/uL  
 HGB 11.5 (L) 12.1 - 17.0 g/dL HCT 35.2 (L) 36.6 - 50.3 % MCV 94.6 80.0 - 99.0 FL  
 MCH 30.9 26.0 - 34.0 PG  
 MCHC 32.7 30.0 - 36.5 g/dL  
 RDW 14.6 (H) 11.5 - 14.5 % PLATELET 366 (L) 663 - 400 K/uL MPV 11.0 8.9 - 12.9 FL  
 NRBC 0.2 (H) 0  WBC ABSOLUTE NRBC 0.03 (H) 0.00 - 0.01 K/uL NEUTROPHILS 88 (H) 32 - 75 % BAND NEUTROPHILS 3 % LYMPHOCYTES 3 (L) 12 - 49 % MONOCYTES 6 5 - 13 % EOSINOPHILS 0 0 - 7 % BASOPHILS 0 0 - 1 % IMMATURE GRANULOCYTES 0 0.0 - 0.5 % ABS. NEUTROPHILS 17.4 (H) 1.8 - 8.0 K/UL  
 ABS. LYMPHOCYTES 0.6 (L) 0.8 - 3.5 K/UL  
 ABS. MONOCYTES 1.1 (H) 0.0 - 1.0 K/UL  
 ABS. EOSINOPHILS 0.0 0.0 - 0.4 K/UL  
 ABS. BASOPHILS 0.0 0.0 - 0.1 K/UL  
 ABS. IMM. GRANS. 0.0 0.00 - 0.04 K/UL  
 DF MANUAL    
 RBC COMMENTS ANISOCYTOSIS 1+ 
    
 RBC COMMENTS HECTOR CELLS 
PRESENT 
    
METABOLIC PANEL, BASIC Collection Time: 04/03/21  4:17 AM  
Result Value Ref Range Sodium 146 (H) 136 - 145 mmol/L Potassium 3.0 (L) 3.5 - 5.1 mmol/L Chloride 113 (H) 97 - 108 mmol/L  
 CO2 25 21 - 32 mmol/L Anion gap 8 5 - 15 mmol/L Glucose 110 (H) 65 - 100 mg/dL BUN 51 (H) 6 - 20 MG/DL Creatinine 2.04 (H) 0.70 - 1.30 MG/DL  
 BUN/Creatinine ratio 25 (H) 12 - 20 GFR est AA 38 (L) >60 ml/min/1.73m2 GFR est non-AA 32 (L) >60 ml/min/1.73m2 Calcium 7.5 (L) 8.5 - 10.1 MG/DL MAGNESIUM Collection Time: 04/03/21  4:17 AM  
Result Value Ref Range Magnesium 1.9 1.6 - 2.4 mg/dL PHOSPHORUS Collection Time: 04/03/21  4:17 AM  
Result Value Ref Range Phosphorus 3.6 2.6 - 4.7 MG/DL Patient is in critical condition and is at risk for sudden deterioration. 30 minutes spent on floor examining patient and reviewing chart

## 2021-04-03 NOTE — PROGRESS NOTES
Consult REFERRED BY: 
Swetha Simpson MD 
 
CHIEF COMPLAINT: Cardiac arrest and anoxic brain injury Subjective:  
 
Madelaine Zhang is a 79 y.o. right-handed  male we are seeing at the request of the intensivist to evaluate patient's neurologic status after cardiac arrest and prolonged resuscitation prognosis and chances of recovery. Patient had PEA, and was coded in the field and went into V. fib and his estimated time down was 30 to 60 minutes. His wife states that CPR began in 1 to 2 minutes after his collapse. He has a known history of recurrent V. fib for which he refused a defibrillator. Patient had a normal CT scan of the head on admission and repeat done yesterday showed only some questionable mild ischemic changes seen in the brain but no change on admission 1, and his EEG showed moderate generalized slowing, no seizure activity, no burst suppression pattern, and no focal abnormalities. Patient with intact cranial nerves, and may be opening his eyes a little more, and moving just a little bit more, but no other major change and remains comatose. Discussed with cardiology, and this will take probably a week to 10 days to see how he does unless something further happens with his multiorgan failure. Also discussed with the wife in detail that we just cannot give a solid prediction of his level of recovery at this time. Did advise her again he has significant injury and will not recover to normal function. Patient has a history of mild bilateral carotid stenosis. He has a past history of significant ischemic heart disease and cardiomyopathy and recurrent V. fib. He also has hypertension and previous MI, and has had a aortic valve replaced and CABG and peripheral arterial disease status post bilateral popliteal stenting his medications include Effient Lasix Prinivil Coreg aspirin Lipitor co-Q10 and Zetia.  
No change today, same as yesterday, and I told the wife that is a bad sign, he is not improving much, prognosis very guarded. We will repeat the EEG and CT scan Monday morning Past Medical History:  
Diagnosis Date  Aortic stenosis  CAD (coronary artery disease) 1989 MI  
 Carotid stenosis   
 mild bilateral  
 Heart failure ([address] ischemic cardiomyopathy  Hypertension Past Surgical History:  
Procedure Laterality Date  HX HERNIA REPAIR  2012  RI CABG, ARTERY-VEIN, FOUR  2009  RI CARDIAC SURG PROCEDURE UNLIST  06/30/2017 TAVR  VASCULAR SURGERY PROCEDURE UNLIST  2014  
 bilat popliteal stenting Family History Problem Relation Age of Onset  Sudden Death Sister   
     cause unknown  Lung Disease Mother  Hypertension Father Social History Tobacco Use  Smoking status: Never Smoker  Smokeless tobacco: Never Used Substance Use Topics  Alcohol use: Yes Comment: socially Current Facility-Administered Medications:  
  [COMPLETED] piperacillin-tazobactam (ZOSYN) 3.375 g in 0.9% sodium chloride (MBP/ADV) 100 mL MBP, 3.375 g, IntraVENous, ONCE, Stopped at 04/01/21 0600 **FOLLOWED BY** piperacillin-tazobactam (ZOSYN) 3.375 g in 0.9% sodium chloride (MBP/ADV) 100 mL MBP, 3.375 g, IntraVENous, Q8H, Tanner Joyce MD, Last Rate: 25 mL/hr at 04/02/21 1210, 3.375 g at 04/02/21 1210   furosemide (LASIX) injection 80 mg, 80 mg, IntraVENous, DAILY, Sharmila Paulson MD, 80 mg at 04/02/21 2848   acetaminophen (TYLENOL) solution 650 mg, 650 mg, Per G Tube, Q4H PRN, Tanner Joyce MD 
  0.45% sodium chloride infusion, 50 mL/hr, IntraVENous, CONTINUOUS, Sharmila Paulson MD, Last Rate: 50 mL/hr at 04/02/21 1204, 50 mL/hr at 04/02/21 1204   heparin (porcine) injection 5,000 Units, 5,000 Units, SubCUTAneous, Q8H, Radha Aguilar DO, 5,000 Units at 04/02/21 1443   fentaNYL citrate (PF) injection 100 mcg, 100 mcg, IntraVENous, Q1H PRN, Gabrielle Aj MD, 100 mcg at 04/01/21 1435 
  fentaNYL (PF) 1,500 mcg/30 mL (50 mcg/mL) infusion, 0-200 mcg/hr, IntraVENous, TITRATE, Kristina Aj MD, Stopped at 03/30/21 8605   chlorhexidine (ORAL CARE KIT) 0.12 % mouthwash 15 mL, 15 mL, Oral, Q12H, Kristina Aj MD, 15 mL at 04/02/21 9835   EPINEPHrine (ADRENALIN) 5 mg in 0.9% sodium chloride 250 mL infusion, 1-20 mcg/min, IntraVENous, TITRATE, Sanaz Shen NP, Last Rate: 9 mL/hr at 04/02/21 1648, 3 mcg/min at 04/02/21 1648   sodium chloride (NS) flush 5-40 mL, 5-40 mL, IntraVENous, Q8H, Ashok Vicente DO, 10 mL at 04/02/21 1443   sodium chloride (NS) flush 5-40 mL, 5-40 mL, IntraVENous, PRN, Myra Suarez DO 
  [DISCONTINUED] acetaminophen (TYLENOL) tablet 650 mg, 650 mg, Oral, Q6H PRN, 650 mg at 03/31/21 0837 **OR** acetaminophen (TYLENOL) suppository 650 mg, 650 mg, Rectal, Q6H PRN, Myra Suarez DO 
  polyethylene glycol (MIRALAX) packet 17 g, 17 g, Oral, DAILY PRN, Myra Suarez DO 
  promethazine (PHENERGAN) tablet 12.5 mg, 12.5 mg, Oral, Q6H PRN **OR** ondansetron (ZOFRAN) injection 4 mg, 4 mg, IntraVENous, Q6H PRN, Myra Suarez DO, 4 mg at 03/29/21 1329   potassium chloride 10 mEq in 100 ml IVPB, 10 mEq, IntraVENous, PRN, Myra RichardsonsDO 
  magnesium sulfate 2 g/50 ml IVPB (premix or compounded), 2 g, IntraVENous, PRN, Ashok Mendez DO 
  famotidine (PF) (PEPCID) 20 mg in 0.9% sodium chloride 10 mL injection, 20 mg, IntraVENous, DAILY, Dwayne Fleming DO, 20 mg at 04/02/21 0821 
  alcohol 62% (NOZIN) nasal  1 Ampule, 1 Ampule, Topical, Q12H, Myra Saunas, DO, 1 Ampule at 04/02/21 0820 
  balsam peru-castor oiL (VENELEX) ointment, , Topical, BID, Myra Saunas, DO, Given at 04/02/21 2856 Allergies Allergen Reactions  Niacin Other (comments) Severe flushing MRI Results (most recent): 
Results from Hospital Encounter encounter on 03/21/19 MRI SHOULDER LT WO CONT  Narrative EXAM: MRI SHOULDER LT WO CONT INDICATION: Rotator cuff arthropathy left shoulder. Left shoulder pain. No 
trauma. Pain with overhead activity COMPARISON: None TECHNIQUE: Axial proton density fat-saturated; oblique coronal T1, T2 
fat-saturated, and proton density fat-saturated; and oblique sagittal T2 
fat-saturated MRI of the left shoulder . CONTRAST: None. FINDINGS: A.C. joint: There is mild degeneration of the acromioclavicular joint 
with remodeling of the undersurface of the acromion Anterior acromion process 
type: 2 Bone marrow: Humeral head is high riding with flattening of the greater 
tuberosity No acute fracture, dislocation, or marrow replacing process. Joint fluid: None. Rotator cuff tendons: There is a massive full-thickness tear of the 
supraspinatus and infraspinatus. The torn fibers have retracted to the glenoid. Subscapularis and teres minor are intact Biceps tendon: Chronically torn and retracted Muscles: There is severe atrophy of the supraspinatus and infraspinatus with 
mild edema of the infraspinatus Glenoid labrum: Superior labrum is degenerated Glenohumeral joint capsule: within normal limits. Glenohumeral articular cartilage: There is thinning of the cartilage of the 
superior humeral head Soft tissue mass: None. Impression IMPRESSION:  
1. Massive full-thickness tears of the supraspinatus and infraspinatus. Torn 
fibers have retracted to the glenoid with severe atrophy and mild edema of the 
infraspinatus 2. Chronic biceps long head tendon tear with distal retraction 3. Mild degeneration of the glenohumeral joint with high riding humeral head 
abutting the undersurface of the acromion Results from [facility] Encounter encounter on 03/21/19 MRI SHOULDER LT WO CONT Narrative EXAM: MRI SHOULDER LT WO CONT INDICATION: Rotator cuff arthropathy left shoulder. Left shoulder pain. No 
trauma. Pain with overhead activity COMPARISON: None TECHNIQUE: Axial proton density fat-saturated; oblique coronal T1, T2 
fat-saturated, and proton density fat-saturated; and oblique sagittal T2 
fat-saturated MRI of the left shoulder . CONTRAST: None. FINDINGS: A.C. joint: There is mild degeneration of the acromioclavicular joint 
with remodeling of the undersurface of the acromion Anterior acromion process 
type: 2 Bone marrow: Humeral head is high riding with flattening of the greater 
tuberosity No acute fracture, dislocation, or marrow replacing process. Joint fluid: None. Rotator cuff tendons: There is a massive full-thickness tear of the 
supraspinatus and infraspinatus. The torn fibers have retracted to the glenoid. Subscapularis and teres minor are intact Biceps tendon: Chronically torn and retracted Muscles: There is severe atrophy of the supraspinatus and infraspinatus with 
mild edema of the infraspinatus Glenoid labrum: Superior labrum is degenerated Glenohumeral joint capsule: within normal limits. Glenohumeral articular cartilage: There is thinning of the cartilage of the 
superior humeral head Soft tissue mass: None. Impression IMPRESSION:  
1. Massive full-thickness tears of the supraspinatus and infraspinatus. Torn 
fibers have retracted to the glenoid with severe atrophy and mild edema of the 
infraspinatus 2. Chronic biceps long head tendon tear with distal retraction 3. Mild degeneration of the glenohumeral joint with high riding humeral head 
abutting the undersurface of the acromion Review of Systems: 
Review of systems not obtained due to patient factors. Vitals:  
 04/02/21 1600 04/02/21 1700 04/02/21 1800 04/02/21 2000 BP: 99/61 106/63 (!) 93/46 121/62 Pulse: 70 75 70 68 Resp: 19 18 20 22 Temp: 98.9 °F (37.2 °C)   99.6 °F (37.6 °C) SpO2: 99% 99% 99% 100% Weight:      
Height:      
 
Objective: I 
 
 
NEUROLOGICAL EXAM: 
 
Appearance:   The patient is well developed, well nourished, intubated and not sedated. Mental Status:  Intubated and not sedated Cranial Nerves:    Visual fields are not testable, fundi are poorly seen, oculocephalics are suppressed but present, corneals are suppressed but present, pupils very miotic but seemingly minimally reactive light, face is symmetric, endotracheal tube in place and hearing is not testable and patient is nonverbal, Neck without meningismus or bruits Temporal arteries are not tender or enlarged TMJ areas are not tender on palpation Motor:   Patient with minimal response to deep pain in all extremities Reflexes:   Deep tendon reflexes 1+/4 and symmetrical. 
No babinski or clonus present Sensory:    Responds only to deep pain or section Gait:  Not testable gait Tremor:   No tremor noted. Cerebellar:  Not testable cerebellar signs present on Romberg and tandem testing and finger-nose-finger exam.  
Neurovascular:  Normal heart sounds and regular rhythm, peripheral pulses decreased, and no carotid bruits. Assessment: ICD-10-CM ICD-9-CM 1. Cardiac arrest (HCC)  I46.9 427.5 Active Problems: 
  Cardiac arrest ([facility] [address] (3/29/2021) Severe anoxic-ischemic encephalopathy ([facility] [address] (3/31/2021) Convulsive syncope (3/31/2021) Acute alteration in mental status (3/31/2021) DNR (do not resuscitate) discussion () Goals of care, counseling/discussion () Unresponsive () KELLY (acute kidney injury) ([facility] [address] () Ischemic cardiomyopathy () Acute respiratory failure with hypoxia (HCC) () Plan:  
 
Patient's EEG does show moderate generalized slowing in his CT scan shows no change, recommend some early ischemic changes, and I explained to the wife that this means he has had a moderate severe brain injury, but is going to take some time to more fully appreciate what his level of recovery might be, but it will not be normal, it is just hard to predict whether vegetative state versus minimally responsive state versus encephalopathic state is his future. Patient with prolonged cardiac resuscitation and downtime 30 to 60 minutes by the intensivist note Patient appears to have severe anoxic brain injury, but clearly has some function in all of his brainstem function it looks like, and is on suctioning and painful stimuli he does have some movement. Explained to the wife that the patient has probable moderately severe anoxic brain injury, and will probably be severely injured, with a level of his disability cannot be accurately predicted so far we will just have to follow him for his clinical findings with his test results with clinical correlation. May need to consider referral somebody CODE STATUS because he already has severe disability related to this and anoxic brain injury. No change today, same as yesterday, and I told the wife that is a bad sign, he is not improving much, prognosis very guarded. We will repeat the EEG and CT scan Monday morning We will follow carefully with you. Signed By: Berenice Watkins MD   
 April 2, 2021 CC: Greta Drake MD 
FAX: [phone]

## 2021-04-03 NOTE — PROGRESS NOTES
Spiritual Care Assessment/Progress Note [facility] 
 
 
NAME: Danya Martinez      MRN: [id number] AGE: 79 y.o. SEX: male Congregational Affiliation: No Pentecostalism Language: [place] 4/3/2021     Total Time (in minutes): 5 Spiritual Assessment begun in [facility] 2 CRITICAL CARE 1 through conversation with: 
  
    [x]Patient        [x] Family    [] Friend(s) Reason for Consult: Palliative Care, Initial/Spiritual Assessment Spiritual beliefs: (Please include comment if needed) 
   [] Identifies with a pauly tradition:     
   [] Supported by a pauly community:        
   [] Claims no spiritual orientation:       
   [] Seeking spiritual identity:            
   [] Adheres to an individual form of spirituality:       
   [x] Not able to assess:                   
 
    
Identified resources for coping:  
   [] Prayer                           
   [] Music                  [] Guided Imagery 
   [] Family/friends                 [] Pet visits [] Devotional reading                         [x] Unknown 
   [] Other:                                         
 
 
Interventions offered during this visit: (See comments for more details) Patient Interventions: Initial visit, Initial/Spiritual assessment, patient floor Family/Friend(s): Affirmation of emotions/emotional suffering, Initial Assessment Plan of Care: 
 
 [] Support spiritual and/or cultural needs  
 [] Support AMD and/or advance care planning process    
 [] Support grieving process 
 [] Coordinate Rites and/or Rituals  
 [] Coordination with community clergy [] No spiritual needs identified at this time 
 [] Detailed Plan of Care below (See Comments)  [] Make referral to Music Therapy 
[] Make referral to Pet Therapy    
[] Make referral to Addiction services 
[] Make referral to [facility] 
[] Make referral to Spiritual Care Partner 
[] No future visits requested       
[x] Follow up upon further referrals Comments:  
 
 made an initiative palliative care assessment visit for Mr. Kiran Gray. Patient was intubated, his wife was at bedside. She denied any spiritual/emotional needs at this time. Advised of  availability. [id number] [facility] Wanda Garcia, M.S., Th.M. 
Spiritual Care Provider  Paging Service [phone])

## 2021-04-04 NOTE — PROGRESS NOTES
[facility] 
 
ICU Intensivist- Critical Care Progress Note Name: Lucretia Godfrey : 1940 MRN: [id number] Admit: 3/29/2021 11:25 AM   
 
Diagnosis:  
 
  
Principal Problem: 
  Severe anoxic-ischemic encephalopathy 
  
Problem List: 
2021: Severe anoxic-ischemic encephalopathy 2021: Convulsive syncope 2021: Acute alteration in mental status 2021: Cardiac arrest 
2018: Coronary artery disease involving native coronary artery of  
native heart without angina pectoris 2018: S/P TAVR (transcatheter aortic valve replacement) KELLY (acute kidney injury) Ischemic cardiomyopathy Acute respiratory failure with hypoxia ICU Comprehensive Plan of Care:  
 
Plans for this Shift:  
1. Anoxic brain encephalopathy after cardiac arrest- radiographic imaging follow up planned. Persistent decerebrate posturing. 
  
2. Acute respiratory failure with hypoxia-  will require tracheostomy. 
  
3. Ischemic cardiomyopathy-  
  
 
Subjective:  
 
Progress Note:  
4/3/2021  
8:54 PM  
 
Reason for ICU Admission:  
Severe anoxic-ischemic encephalopathy ([facility] [address] Overnight Events:  
Some flexion RUE Past Medical History:  
 
 has a past medical history of Aortic stenosis, CAD (coronary artery disease) (), Carotid stenosis, Heart failure ([address] and Hypertension. Past Surgical History:  
 
 has a past surgical history that includes pr cabg, artery-vein, four (); pr cardiac surg procedure unlist (2017); vascular surgery procedure unlist (); and hx hernia repair (). Current Medications:  
 
Current Facility-Administered Medications Medication Dose Route Frequency  [START ON 2021] furosemide (LASIX) injection 40 mg  40 mg IntraVENous DAILY  dextrose 5% - 0.45% NaCl with KCl 40 mEq/L infusion   IntraVENous CONTINUOUS  piperacillin-tazobactam (ZOSYN) 3.375 g in 0.9% sodium chloride (MBP/ADV) 100 mL MBP  3.375 g IntraVENous Q8H  
 acetaminophen (TYLENOL) solution 650 mg  650 mg Per G Tube Q4H PRN  
 heparin (porcine) injection 5,000 Units  5,000 Units SubCUTAneous Q8H  
 fentaNYL citrate (PF) injection 100 mcg  100 mcg IntraVENous Q1H PRN  
 fentaNYL (PF) 1,500 mcg/30 mL (50 mcg/mL) infusion  0-200 mcg/hr IntraVENous TITRATE  chlorhexidine (ORAL CARE KIT) 0.12 % mouthwash 15 mL  15 mL Oral Q12H  EPINEPHrine (ADRENALIN) 5 mg in 0.9% sodium chloride 250 mL infusion  1-20 mcg/min IntraVENous TITRATE  sodium chloride (NS) flush 5-40 mL  5-40 mL IntraVENous Q8H  
 sodium chloride (NS) flush 5-40 mL  5-40 mL IntraVENous PRN  
 acetaminophen (TYLENOL) suppository 650 mg  650 mg Rectal Q6H PRN  polyethylene glycol (MIRALAX) packet 17 g  17 g Oral DAILY PRN  promethazine (PHENERGAN) tablet 12.5 mg  12.5 mg Oral Q6H PRN Or  
 ondansetron (ZOFRAN) injection 4 mg  4 mg IntraVENous Q6H PRN  potassium chloride 10 mEq in 100 ml IVPB  10 mEq IntraVENous PRN  
 magnesium sulfate 2 g/50 ml IVPB (premix or compounded)  2 g IntraVENous PRN  
 famotidine (PF) (PEPCID) 20 mg in 0.9% sodium chloride 10 mL injection  20 mg IntraVENous DAILY  alcohol 62% (NOZIN) nasal  1 Ampule  1 Ampule Topical Q12H  
 balsam peru-castor oiL (VENELEX) ointment   Topical BID Allergies/Social/Family History: Allergies Allergen Reactions  Niacin Other (comments) Severe flushing Social History Tobacco Use  Smoking status: Never Smoker  Smokeless tobacco: Never Used Substance Use Topics  Alcohol use: Yes Comment: socially Family History Problem Relation Age of Onset  Sudden Death Sister   
     cause unknown  Lung Disease Mother  Hypertension Father Review of Systems:  
 
Review of systems not obtained due to patient factors. Objective:  
Vital Signs: 
Visit Vitals /67 (BP 1 Location: Left upper arm, BP Patient Position: At rest) Pulse 75 Temp 98.6 °F (37 °C) Resp 19 Ht 5' 11\" (1.803 m) Wt 73.6 kg (162 lb 4.1 oz) SpO2 100% BMI 22.63 kg/m² O2 Device: Endotracheal tube, Ventilator Temp (24hrs), Av.7 °F (37.1 °C), Min:98.1 °F (36.7 °C), Max:99.2 °F (37.3 °C) Intake/Output:  
 
Intake/Output Summary (Last 24 hours) at 4/3/2021 2054 Last data filed at 4/3/2021 2000 Gross per 24 hour Intake 2242.65 ml Output 4870 ml Net -2627.35 ml Physical Exam: 
General:   Comatose, no distress, appears stated age. Eyes:   Conjunctivae/corneas clear. PERRL, EOMs intact. Ears:   Normal external ear canals bilaterally. Nose:   No drainage or sinus tenderness. Mouth/Throat:  Moist mucous membranes. Dentition normal.   
Neck:  Symmetrical, trachea midline, no JVD or carotid bruit. Back:    No CVA tenderness to percussion. Lungs:    Clear to auscultation bilaterally. Heart:   Regular rate and rhythm. S1, S2 normal, without murmur, click, rub or gallop. Abdomen:    Normoactive bowel sounds. Soft, non-tender, no masses or organomegaly. Extremities:  Atraumatic, no cyanosis or edema. Vascular:  Pulses 2+ and symmetric UE/LE bilat Skin:  Normal color, non-diaphoretic, normal capillary refill (Less than 2 seconds). No rashes or lesions. Lymph nodes:  No Lymphadenopathy. Neurologic:  Comatose with decerebrate posturing   
  
 
 
LABS AND  DATA: Personally reviewed Recent Labs 21 
5105 21 
0919 WBC 19.1* 15.1* HGB 11.5* 10.9* HCT 35.2* 33.8*  
* 113* Recent Labs 21 
3168 21 
2816 * 145  
K 3.0* 3.6 * 114* CO2 25 22 BUN 51* 47* CREA 2.04* 2.46* * 113* CA 7.5* 7.7* MG 1.9 1.8 PHOS 3.6 3.8 No results for input(s): AP, TBIL, TP, ALB, GLOB, AML, LPSE in the last 72 hours. No lab exists for component: SGOT, GPT, AMYP No results for input(s): INR, PTP, APTT, INREXT in the last 72 hours. No results for input(s): PHI, PCO2I, PO2I, FIO2I in the last 72 hours.  
No results for input(s): CPK, CKMB, TROIQ, BNPP in the last 72 hours. Ventilator Settings: 
Mode Rate Tidal Volume Pressure FiO2 PEEP Assist control   400 ml    40 % 5 cm H20 Peak airway pressure: 24 cm H2O Minute ventilation: 10.6 l/min MEDS: Reviewed RADIOLOGY: 
No results found. Assessment:  
 
Hospital Problems  Date Reviewed: 9/3/2019 Codes Class Noted POA  
 DNR (do not resuscitate) discussion ICD-10-CM: Z71.89 ICD-9-CM: V65.49  Unknown Unknown Goals of care, counseling/discussion ICD-10-CM: Z71.89 ICD-9-CM: V65.49  Unknown Unknown Unresponsive ICD-10-CM: R41.89 ICD-9-CM: 780.09  Unknown Unknown KELLY (acute kidney injury) ([facility] [address] ICD-10-CM: N17.9 ICD-9-CM: 821. 9  Unknown Unknown Ischemic cardiomyopathy ICD-10-CM: I25.5 ICD-9-CM: 414.8  Unknown Unknown Acute respiratory failure with hypoxia (HCC) ICD-10-CM: J96.01 
ICD-9-CM: 518.81  Unknown Unknown Severe anoxic-ischemic encephalopathy (HCC) ICD-10-CM: G93.1, I67.82 
ICD-9-CM: 348.1, 437.1  3/31/2021 Yes Convulsive syncope ICD-10-CM: R55 
ICD-9-CM: 780.2, 780.39  3/31/2021 Unknown Acute alteration in mental status ICD-10-CM: R41.82 
ICD-9-CM: 780.97  3/31/2021 Yes Cardiac arrest [facility]) ICD-10-CM: I46.9 ICD-9-CM: 427.5  3/29/2021 Unknown DISPOSITION Stay in ICU 
  
CRITICAL CARE CONSULTANT NOTE I provided a face-to-face bedside physician/patient encounter, greater than the usual and customary amount normally needed, due to the high complexity of medical decision-making required. 
  
I reviewed and interpreted patient data including clinical events, labs, images, vital signs, I/O's, and examined patient.   
  
I have actively participated in multi-disciplinary discussions (Cardiology, Neurology, Radiology, CCU Nursing staff) regarding the case in formulating an optimal therapeutic plan, and effecting a management strategy for this patient. 
  
NOTE OF PERSONAL INVOLVEMENT IN CARE  
This patient has a high probability of imminent, clinically significant deterioration, which requires the highest level of preparedness to intervene urgently. I participated in the decision-making and personally managed, or directed the management of, a myriad of life and organ supporting interventions which required my frequent-interval clinical reassessments, in order to treat or prevent imminent deterioration. 
  
I personally spent 35 minutes of critical care time.  This is time spent at this critically ill patient's bedside actively involved in patient care as well as the coordination of care and discussions with the patient's family.  This does not include any procedural time which has been billed separately. 
Kirsten Ritchie MD, FACS Staff C/ [address] 4/3/2021

## 2021-04-04 NOTE — PROGRESS NOTES
Cardiology Progress Note 4/4/2021    Admit Date: 3/29/2021 Admit Diagnosis: Cardiac arrest ([facility] [address] [I46.9]  CC:  None currently Assessment (per Dr Marycruz Kolb):   
  
Out of hospital cardiac arrest and resuscitation     PEA initial , followed by V Fib and shock to rhythm and ROSC .   
Cardiogenic shock .  On Levophed and EPI . Intubated, on ventilator. EKG shows sinus rhythm , NSSTT changes.   No findings of STEMI .  
  
Recent office visit to f/u Holter which showed multiple runs of NSVT  .  Patient has previously declined ICD and again at recent office visit declined ICD. This could well have been a primary arrhythmic arrest . Cannot exclude  Vein graft occlusion .  
  
Chronic EF 20 %.    
  
  
Cath in 2009 showed severe 3 vessel disease and he underwent CABG  X 4.  
    LIMA to LAD .   SVG to RCA and PDA.    SVG to Marginal.  
 At that time his EF was 15% . 
  
S/P TAVR for Aortic Stenosis .  2017   
    
Bilateral Carotid ASO Ischemic Cardiomyopathy . Chronic systolic heart failure. Hyperlipidemia. PAD with previous peripheral interventions.   
Acute renal failure. DNR  
  
3/30  Sinus bradycardia overnight and Amiodarone was stopped. He is requiring higher doses of EPI and LEVO to support BP Creatinine up . Lactic acid elevated. Troponin up to 50 . Echo showed EF 15% at best .   4 chamber dilated heart . I have discussed with his wife here.  
  
3/31  Day 2 : He remains comatose on ventilator. He is not on any sedative and has minimal brainstem reflex response He has spiked a fever to 103. Likely a central fever but need to r/o infectious source such as Aspiration pneumonia . He is on antibiotic He is now off [place] , remains on some EPI . Remains anuric . K+ is better after treatment yesterday Was acidotic earlier this AM and had 1 unit of Bicarb. Rhythm has been stable.  
  
4/1 remains comatose on ventilator.   He has some reflex responses,  Eyelids twitch to touch, some eyelid movement in response to sternal rub. Pupils small , fixed. CXR showed pulmonary edema . Still only minimal urine output, though creatinine down a little. Epi dose has come down to 6 mcgs now He's been afebrile since the spike yesterday AM . EEG result pending . CT of head 3/31 showed no change.  
  
4/2  Seems about the same . Remains comatose. Minimal reflexive response to stimulus. Has had good urine output . Creatinine a little better again CXR reviewed  Pretty reasonable . Some mild edema , no infiltrate. WBC elevated. Afebrile. Rhythm stable,   Sinus with PVCs. Still on some low dose Epi .  
  
Usual cardiologist is Dr Dilma Torres (per Dr Christina Mack):    
  
Poor prognosis but stable . Just wait and see. Continue support 
  
Continue diuretic  
  
Wife at the bedside and lengthy discussion with her explaining current status and plans. Limited Prognosis . Advised her that family members should come if possible if they would like to see him before he passes. Cannot predict time course but prognosis remains poor. 4/3: BPs 110-120s; HR 60-80s; NSVT on tele. 100% on vent (40%; PEEP 5). No CXR. WBC 19; Hg 11.5; plt 113. Na 146; K 3; Cr 2 from 2.46; Ca 7.5. Cardiac meds; lasix 40 IV qday; NS @ 50. Epi @ 2 (from 4). K Rx by CC. No new cardiac recs; weaning pressors as able. D/w family member @ bedside. 4/3: BPs 72-120s; HR 60-80s; NSVT on tele. 99% on vent (40%; PEEP 5). No CXR. WBC 15; Hg 11.2; plt 101. Na 148; K 3.3; Cr 1.76 from 2 from 2.46; Ca 7.7 from 7.5. Cardiac meds; lasix 40 IV qday; NS @ 50. Epi @ 1 (from 2 from 4). K Rx by CC. No new cardiac recs; weaning pressors as able. D/w family member @ bedside. For other plans, see orders. High complexity decision making was performed  X Yes High-risk of decompensation with multiple organ involvement X Yes  
Hospital problem list  
Active Hospital Problems Diagnosis Date Noted  DNR (do not resuscitate) discussion  Goals of care, counseling/discussion  Unresponsive  KELLY (acute kidney injury) ([facility] [address]  Ischemic cardiomyopathy  Acute respiratory failure with hypoxia (HCC)  Severe anoxic-ischemic encephalopathy ([facility] [address] 2021  Convulsive syncope 2021  Acute alteration in mental status 2021  Cardiac arrest ([facility] [address] 2021 Subjective:  Patient reports  [x]   nothing; unable to communicate    [x]   intubated Chest pain  none  consistent with:  Non-cardiac CP Atypical CP None now  On going  Anginal CP Dyspnea  none  at rest  with exertion    
    improved  unchanged  worse PND  none  overnight Orthopnea  none  improved  unchanged  worse Presyncope  none  improved  unchanged  worse Ambulated in hallway without symptoms   Yes Ambulated in room without symptoms  Yes ROS Hematuria:  Yes X No Dysuria:  Yes X No  
            
(2+  other systems) Cough: Yes X No Sputum: Yes X No  
            
 BRBPR:  Yes X No Melena: Yes X No  
No change in family and social history from H&P/Consult note. Objective:  
 Physical Exam: 
24 hr VS reviewed, overall VSSAF Temp (24hrs), Av.4 °F (36.9 °C), Min:97.8 °F (36.6 °C), Max:99 °F (37.2 °C) Patient Vitals for the past 8 hrs: 
 Pulse 21 1000 75  
21 0900 100  
21 0805 74  
21 0800 82  
21 0700 76  
21 0600 73  
21 0500 72  
21 0400 74  
21 0356 73 Patient Vitals for the past 8 hrs: 
 Resp  
21 1000 20  
21 0900 26  
21 0805 18  
21 0800 19  
21 0700 19  
21 0600 18  
21 0500 17  
21 0400 14  
21 0356 25 Patient Vitals for the past 8 hrs: 
 BP  
21 1000 118/66  
21 0900 127/82  
21 0800 123/79 04/04/21 0700 116/64  
04/04/21 0600 113/63  
04/04/21 0500 106/60  
04/04/21 0400 115/72 Intake/Output Summary (Last 24 hours) at 4/4/2021 1103 Last data filed at 4/4/2021 1000 Gross per 24 hour Intake 2619 ml Output 4155 ml Net -1536 ml General:  WD,WN  Elderly  Cachetic x NAD Agitated  Lethargic  Arousable  Obtunded Sedated  On Bipap x Intubated ENT/Palate: WNL  Dry MM  anicteric  ETT in place Respiratory: X CTA ant.lat  Nl resp effort  Increased effort  No significant change  
  rhonchi  rales  improved  worse Cardiovasc: X RRR  IRRR X Nl S1, S2 x No rub No murmur X No new murmur  Murmur c/w: x No gallop  
 x No edema  BLE edema:+  RLE edema:+  LLE edema:+ Edema less  Edema more  Edema same  Edema worse X Nl JVP  Elevated JVP  JVP same  JVP worse X Carotid wnl x abd aorta not palpated X no peripheral emboli noted GI: X abd soft x nondistended X BS present X No organo- megaly noted Skin: X Warm, dry  Cold extremites Neuro:  A/O  Grossly non- focal  Obtunded  Sedated  
 x Lethargic  Arousable x intubated    
 
cath site intact w/o hematoma or new bruit; distal pulse unchanged  Yes Data Review:    
Telemetry independently reviewed x sinus  chronic afib  parox afib x NSVT 4/3 am  
 
ECG independently reviewed  NSR  afib  no significant changes  NSST-Tw chgs  
x no new ECG provided for review Lab results reviewed as noted below. Current medications reviewed as noted below. No results for input(s): PH, PCO2, PO2 in the last 72 hours. No results for input(s): CPK, CKMB, CKNDX, TROIQ in the last 72 hours. Recent Labs 04/04/21 
6692 04/03/21 
8213 04/02/21 
9630 * 146* 145  
K 3.3* 3.0* 3.6 * 113* 114* CO2 26 25 22 BUN 46* 51* 47* CREA 1.76* 2.04* 2.46* GFRAA 45* 38* 31* * 110* 113* PHOS 2.8 3.6 3.8 CA 7.7* 7.5* 7.7* WBC 14.9* 19.1* 15.1* HGB 11.2* 11.5* 10.9* HCT 34.5* 35.2* 33.8*  
* 113* 113* No results for input(s): AP, TBIL, TBILI, TP, ALB, GLOB, GGT, AML, LPSE in the last 72 hours. No lab exists for component: SGOT, GPT, AMYP, HLPSE No results for input(s): INR, PTP, APTT, INREXT, INREXT in the last 72 hours. No results for input(s): FE, TIBC, PSAT, FERR in the last 72 hours. Lab Results Component Value Date/Time Glucose (POC) 226 (H) 03/29/2021 11:29 AM  
 Glucose (POC) 116 (H) 01/19/2009 09:34 PM  
 Glucose (POC) 111 (H) 01/19/2009 04:33 PM  
 Glucose (POC) 115 (H) 01/19/2009 12:07 PM  
 Glucose (POC) 106 01/19/2009 08:27 AM  
 
 
Current Facility-Administered Medications Medication Dose Route Frequency  potassium chloride 10 mEq in 100 ml IVPB  10 mEq IntraVENous Q1H  
 furosemide (LASIX) injection 40 mg  40 mg IntraVENous DAILY  dextrose 5% - 0.45% NaCl with KCl 40 mEq/L infusion   IntraVENous CONTINUOUS  piperacillin-tazobactam (ZOSYN) 3.375 g in 0.9% sodium chloride (MBP/ADV) 100 mL MBP  3.375 g IntraVENous Q8H  
 acetaminophen (TYLENOL) solution 650 mg  650 mg Per G Tube Q4H PRN  
 heparin (porcine) injection 5,000 Units  5,000 Units SubCUTAneous Q8H  
 chlorhexidine (ORAL CARE KIT) 0.12 % mouthwash 15 mL  15 mL Oral Q12H  EPINEPHrine (ADRENALIN) 5 mg in 0.9% sodium chloride 250 mL infusion  1-20 mcg/min IntraVENous TITRATE  sodium chloride (NS) flush 5-40 mL  5-40 mL IntraVENous Q8H  
 sodium chloride (NS) flush 5-40 mL  5-40 mL IntraVENous PRN  
 acetaminophen (TYLENOL) suppository 650 mg  650 mg Rectal Q6H PRN  polyethylene glycol (MIRALAX) packet 17 g  17 g Oral DAILY PRN  potassium chloride 10 mEq in 100 ml IVPB  10 mEq IntraVENous PRN  
 magnesium sulfate 2 g/50 ml IVPB (premix or compounded)  2 g IntraVENous PRN  
 famotidine (PF) (PEPCID) 20 mg in 0.9% sodium chloride 10 mL injection  20 mg IntraVENous DAILY  alcohol 62% (NOZIN) nasal  1 Ampule  1 Ampule Topical Q12H  
 balsam peru-castor oiL (VENELEX) ointment   Topical BID Jennifer Wilson MD

## 2021-04-04 NOTE — PROGRESS NOTES
Pharmacy Automatic Renal Dosing Protocol - Antimicrobials Indication for Antimicrobials: aspiration PNA Current Regimen of Each Antimicrobial:  
Piperacillin/tazobactam 3.375g IV q12h, start 3/31; day 45 Previous Antimicrobial Therapy: N/a Significant Cultures:  
none Paralysis, amputations, malnutrition: none Labs: 
Recent Labs 21 
2969 21 
9510 21 
6788 CREA 1.76* 2.04* 2.46* BUN 46* 51* 47* WBC 14.9* 19.1* 15.1* Temp (24hrs), Av.6 °F (37 °C), Min:97.8 °F (36.6 °C), Max:99 °F (37.2 °C) Creatinine Clearance (mL/min) or Dialysis: 34.3 mL/min (actual weight) Impression/Plan:  
Out of hospital cardiac arrest and resuscitation; Cardiogenic shock; poor prognosis but stable Vented, sedated, weems, epi support SCr improved, WBC same, afebrile Continue current dose of Zosyn Antimicrobial stop date 5 days with last dose scheduled as 4/4 pm  
 
Pharmacy will follow daily and adjust medications as appropriate for renal function and/or serum levels.  
 
Thank you, 
David Wang, [facility]

## 2021-04-04 NOTE — PROGRESS NOTES
04/04/21 7991 ABCDEF Bundle SBT Trial Passed Yes Weaning Parameters Spontaneous Breathing Trial Complete Yes Resp Rate Observed 17 Ve 9.4  RSBI 32 Pt still on spont. Mode  Tolerating well . Nurse aware

## 2021-04-04 NOTE — PROGRESS NOTES
0700 Uneventful night. Epinephrine gtt remains @ 2 mcg/min. Bedside and Verbal shift change report given to [employer]. Report included the following information SBAR, Kardex, ED Summary, Intake/Output, MAR, Recent Results and Cardiac Rhythm NSR, BBB.

## 2021-04-04 NOTE — PROGRESS NOTES
Dr. Sonia Ware in to see patient. 5008 Patient has duplicate orders in for potassium IV. Discussed with Dr. Sonia Ware and patient is to received 4 of the runs for total of 40meq IV. 0 Dr. Neli Cartagena in to see patient. 1655 Patient to CT for Ct of head. Nurse and respiratory therapist with patient. 1720 Patient back to room from CT scan.  
1900 End of Shift Note Bedside shift change report given to Jessie Lagunas RN (oncoming nurse) by Drew Davidson RN (offgoing nurse). Report included the following information SBAR, Kardex, MAR, Accordion and Recent Results Shift worked:  7am to [facility] Shift summary and any significant changes:  
  none at this time Concerns for physician to address:  none at this time Zone phone for oncoming shift:   n/a Activity: 
Activity Level: Bed Rest 
Number times ambulated in hallways past shift: 0 Number of times OOB to chair past shift: 0 Cardiac:  
Cardiac Monitoring: Yes     
Cardiac Rhythm: Normal sinus rhythm Access:  
Current line(s): central line Genitourinary:  
Urinary status: weems Respiratory:  
O2 Device: Endotracheal tube, Ventilator Chronic home O2 use?: NO Incentive spirometer at bedside: NO 
  
GI: 
Last Bowel Movement Date: (PTA) Current diet:  DIET NPO 
DIET TUBE FEEDING Please hold for MAP's consistently <60 Passing flatus: NO Tolerating current diet: NO 
  
 
Pain Management:  
Patient states pain is manageable on current regimen: N/A Skin: 
Cristi Score: 10 Interventions: turn team, float heels and internal/external urinary devices Patient Safety: 
Fall Score: Total Score: 3 Interventions: bed/chair alarm and gripper socks High Fall Risk: Yes Length of Stay: 
Expected LOS: 3d 12h Actual LOS: 6 Drew Davidson, RN

## 2021-04-05 NOTE — PROGRESS NOTES
Chief Complaint: comatose Patient condition unchanged. Unsedated for five days and remains intubated. Wife by the bed side. Discussed the results of the EEG. Discussed the guarded prospects of recovery. She shared with me that he was athletic, well educated and an accomplished musician. He is scheduled for an MRI today Assesment and Plan 1. Anoxic brain injury Condition unchanged. Day 5 off sedation. Exam does not reveal any signs of interactive consciousness. EEG shows generalized low amplitude slowing Given circumstances and lack of progression on exam, his prospects of meaningful recovery remains slim this is especially grim 1 viewed within the context of his poor heart performance. 2.  Ischemic cardiomyopathy Ejection fraction 15 to 20% 3. Respiratory failure On ventilator Allergies Niacin Medications Current Facility-Administered Medications Medication Dose Route Frequency  0.9% sodium chloride infusion  10 mL/hr IntraVENous CONTINUOUS  
 fentaNYL citrate (PF) injection 100 mcg  100 mcg IntraVENous Q6H PRN  potassium bicarb-citric acid (EFFER-K) tablet 40 mEq  40 mEq Per G Tube BID  furosemide (LASIX) injection 40 mg  40 mg IntraVENous DAILY  dextrose 5% - 0.45% NaCl with KCl 40 mEq/L infusion   IntraVENous CONTINUOUS  
 acetaminophen (TYLENOL) solution 650 mg  650 mg Per G Tube Q4H PRN  
 heparin (porcine) injection 5,000 Units  5,000 Units SubCUTAneous Q8H  
 chlorhexidine (ORAL CARE KIT) 0.12 % mouthwash 15 mL  15 mL Oral Q12H  EPINEPHrine (ADRENALIN) 5 mg in 0.9% sodium chloride 250 mL infusion  1-20 mcg/min IntraVENous TITRATE  sodium chloride (NS) flush 5-40 mL  5-40 mL IntraVENous Q8H  
 sodium chloride (NS) flush 5-40 mL  5-40 mL IntraVENous PRN  
 acetaminophen (TYLENOL) suppository 650 mg  650 mg Rectal Q6H PRN  polyethylene glycol (MIRALAX) packet 17 g  17 g Oral DAILY PRN  potassium chloride 10 mEq in 100 ml IVPB  10 mEq IntraVENous PRN  
 magnesium sulfate 2 g/50 ml IVPB (premix or compounded)  2 g IntraVENous PRN  
 famotidine (PF) (PEPCID) 20 mg in 0.9% sodium chloride 10 mL injection  20 mg IntraVENous DAILY  alcohol 62% (NOZIN) nasal  1 Ampule  1 Ampule Topical Q12H  
 balsam peru-castor oiL (VENELEX) ointment   Topical BID Medical History Past Medical History:  
Diagnosis Date  Aortic stenosis  CAD (coronary artery disease) 1989 MI  
 Carotid stenosis   
 mild bilateral  
 Heart failure ([address] ischemic cardiomyopathy  Hypertension ROS Unobtainable patient is intubated and comatose Exam: 
 
Visit Vitals /72 Pulse 66 Temp 98.6 °F (37 °C) Resp 16 Ht 5' 11\" (1.803 m) Wt 162 lb 4.1 oz (73.6 kg) SpO2 100% BMI 22.63 kg/m² General: intubated Head: Normocephalic, atraumatic, anicteric sclera Lungs:  Clear to auscultation Cardiac: Regular rate and rhythm . Abd: Bowel sounds were audible. Ext: No pedal edema Skin: Supple no rash  
  
NeurologicExam: 
Mental Status: comatose Speech: Non verbal  
Cranial Nerves:  opens eyes spontaneously 
does not respond to sound 
dolls eyes intact PERRL 
corneal reflex intact 
symmetric grimace Weak gag reflex Motor:  Weak withdraw, more so on the right. Reflexes:   Deep tendon reflexes 1+/4 and symmetric. Tremor:   No tremor noted Lab Review Lab Results Component Value Date/Time WBC 10.7 04/05/2021 04:19 AM  
 HCT 32.4 (L) 04/05/2021 04:19 AM  
 HGB 10.3 (L) 04/05/2021 04:19 AM  
 PLATELET 81 (L) 84/73/3847 04:19 AM  
 
 
Lab Results Component Value Date/Time Sodium 147 (H) 04/05/2021 04:19 AM  
 Potassium 3.6 04/05/2021 04:19 AM  
 Chloride 115 (H) 04/05/2021 04:19 AM  
 CO2 26 04/05/2021 04:19 AM  
 Glucose 115 (H) 04/05/2021 04:19 AM  
 BUN 40 (H) 04/05/2021 04:19 AM  
 Creatinine 1.60 (H) 04/05/2021 04:19 AM  
 Calcium 8.4 (L) 04/05/2021 04:19 AM  
 
 
 
Lab Results Component Value Date/Time Hemoglobin A1c 5.5 01/13/2009 03:00 AM  
  
 
 
Lab Results Component Value Date/Time Cholesterol, total 131 05/17/2018 10:06 AM  
 HDL Cholesterol 40 05/17/2018 10:06 AM  
 LDL, calculated 64 05/17/2018 10:06 AM  
 VLDL, calculated 27 05/17/2018 10:06 AM  
 Triglyceride 134 05/17/2018 10:06 AM  
 CHOL/HDL Ratio 3.8 04/15/2010 12:11 PM  
 
Patient is in critical condition and is at risk for sudden deterioration. 30 minutes spent on floor examining patient and reviewing chart.

## 2021-04-05 NOTE — PROGRESS NOTES
Care Management: 
  
Pt is an 80 yo male admitted for out of hospital cardiac arrest. Intubated. Anoxic brain injury. Neuro following . CM attempted to do an assessment with wife today . Wife at bedside and cm introduced self and explained role. Asked question about patient baseline prior to admission and wife became very upset . CM apologized for upsetting her and tried to talk with her for a few minutes but unsuccessful. We will hold off on  assessment at this time. Family has agreed to DNR status and do not want HD.  Prognosis is poor. 
  
CM remains avbl to assist w/ planning as appropriate. 
  
Rafi Welsh RN ACM

## 2021-04-05 NOTE — PROGRESS NOTES
Palliative Medicine [place]: [phone] ([phone]) [facility]: [phone] ([phone]) Patient remains intubated, wife Mina Lincoln of 27 years at bedside. She has been devoted and at his side, she has had an opportunity to speak to all the doctors that are involved. Introduced role of Richard Watson 1615, it does not appear that Mina Lincoln will use our services much, she is focused right now on speaking to all the speciailsts and getting information from them. She does have a good network of support, from friends. She shared that patient's daughter and sister have been here to see him. She is more comfortable seeking support from those that she has a relationship with already. Coping as well as expected under the current circumstances Mina Lincoln did ask about what things would look like, if and when it is decided that current path of treatment is not what she wants. LCSW explained to her the path of aggressive, full restorative care vs the focus on comfort till EOL. Discussed hospice and process of compassionate extubation, when she is ready for that. She did not have any further questions, but was glad to have this information. She also asked about DNR / re-intubation if needed and LCSW clarified that in patient's current condition, having CPR again would not result in a better functional or mental state for patient, Mina Lincoln agreed and understands that further CPR would not help patient, should he die . Have given my contact to Mina Lincoln, will not make regular visits, but will allow her to contact us if needed.

## 2021-04-05 NOTE — PROGRESS NOTES
Pt seen and talked with his wife again this afternoon MRI shows no acute events. No structural changes. EEG  done today showed low amplitude  generalized slowing . Dr Yg Hutton note indicates unlikely to survive . He is 1 week out from acute insult, has been off any sedation for 5 days    He has not shown any signs of purposeful neurologic improvement . He has suffered severe anoxic brain injury and there is no reasonable expectation of recovery or survival .   
 
Talked with his wife about withdrawal of support , ventilator and allow for him to pass away . She seems to understand this and is trying to come to terms with the finality . She expressed being grateful that she has been able to spend so much time with him here this week . I advised her to contact her brother who is in [place] and will come down to be with her . Would consider withdrawing support if she feels ready for that tomorrow . Would have Palliative care see her again in the AM to help with support.

## 2021-04-05 NOTE — PROGRESS NOTES
04/05/21 9193 ABCDEF Bundle SBT Safety Screen Passed Yes SBT Trial Passed Yes Weaning Parameters Spontaneous Breathing Trial Complete Yes Resp Rate Observed 28 Ve 10.2  RSBI 74 Pt left on spontaneous mode tolerating well , nurse aware

## 2021-04-05 NOTE — PROGRESS NOTES
[facility] 
 
ICU Intensivist- Critical Care Progress Note Name: Haider Hawley : 1940 MRN: [id number] Admit: 3/29/2021 11:25 AM   
 
Diagnosis:  
 
Principal Problem: 
  Severe anoxic-ischemic encephalopathy 
  
Problem List: 
2021: Severe anoxic-ischemic encephalopathy 2021: Convulsive syncope 2021: Acute alteration in mental status 2021: Cardiac arrest 
2018: Coronary artery disease involving native coronary artery of  
native heart without angina pectoris 2018: S/P TAVR (transcatheter aortic valve replacement) KELLY (acute kidney injury) Ischemic cardiomyopathy Acute respiratory failure with hypoxia ICU Comprehensive Plan of Care:  
 
Plans for this Shift:  
1. Anoxic brain encephalopathy after cardiac arrest- radiographic imaging follow up planned. Decorticate posturing RUE, BLE. 
  
2. Acute respiratory failure with hypoxia-  will require tracheostomy. 
  
3. Ischemic cardiomyopathy Subjective:  
 
Progress Note:  
2021  
7:23 PM  
 
Reason for ICU Admission:  
Severe anoxic-ischemic encephalopathy ([facility] [address] Overnight Events:  
Persistent decorticate posturing RUE & BLE Past Medical History:  
 
 has a past medical history of Aortic stenosis, CAD (coronary artery disease) (), Carotid stenosis, Heart failure ([address] and Hypertension. Past Surgical History:  
 
 has a past surgical history that includes pr cabg, artery-vein, four (); pr cardiac surg procedure unlist (2017); vascular surgery procedure unlist (); and hx hernia repair (). Current Medications:  
 
Current Facility-Administered Medications Medication Dose Route Frequency  0.9% sodium chloride infusion  10 mL/hr IntraVENous CONTINUOUS  
 fentaNYL citrate (PF) injection 100 mcg  100 mcg IntraVENous Q6H PRN  potassium bicarb-citric acid (EFFER-K) tablet 40 mEq  40 mEq Per G Tube BID  furosemide (LASIX) injection 40 mg  40 mg IntraVENous DAILY  dextrose 5% - 0.45% NaCl with KCl 40 mEq/L infusion   IntraVENous CONTINUOUS  
 acetaminophen (TYLENOL) solution 650 mg  650 mg Per G Tube Q4H PRN  
 heparin (porcine) injection 5,000 Units  5,000 Units SubCUTAneous Q8H  
 chlorhexidine (ORAL CARE KIT) 0.12 % mouthwash 15 mL  15 mL Oral Q12H  EPINEPHrine (ADRENALIN) 5 mg in 0.9% sodium chloride 250 mL infusion  1-20 mcg/min IntraVENous TITRATE  sodium chloride (NS) flush 5-40 mL  5-40 mL IntraVENous Q8H  
 sodium chloride (NS) flush 5-40 mL  5-40 mL IntraVENous PRN  
 acetaminophen (TYLENOL) suppository 650 mg  650 mg Rectal Q6H PRN  polyethylene glycol (MIRALAX) packet 17 g  17 g Oral DAILY PRN  potassium chloride 10 mEq in 100 ml IVPB  10 mEq IntraVENous PRN  
 magnesium sulfate 2 g/50 ml IVPB (premix or compounded)  2 g IntraVENous PRN  
 famotidine (PF) (PEPCID) 20 mg in 0.9% sodium chloride 10 mL injection  20 mg IntraVENous DAILY  alcohol 62% (NOZIN) nasal  1 Ampule  1 Ampule Topical Q12H  
 balsam peru-castor oiL (VENELEX) ointment   Topical BID Allergies/Social/Family History: Allergies Allergen Reactions  Niacin Other (comments) Severe flushing Social History Tobacco Use  Smoking status: Never Smoker  Smokeless tobacco: Never Used Substance Use Topics  Alcohol use: Yes Comment: socially Family History Problem Relation Age of Onset  Sudden Death Sister   
     cause unknown  Lung Disease Mother  Hypertension Father Review of Systems:  
 
Review of systems not obtained due to patient factors. Objective:  
Vital Signs: 
Visit Vitals /71 Pulse 73 Temp 98.3 °F (36.8 °C) Resp 21 Ht 5' 11\" (1.803 m) Wt 73.6 kg (162 lb 4.1 oz) SpO2 100% BMI 22.63 kg/m² O2 Device: Endotracheal tube, Ventilator Temp (24hrs), Av °F (37.2 °C), Min:98.3 °F (36.8 °C), Max:100.7 °F (38.2 °C) Intake/Output:  
 
Intake/Output Summary (Last 24 hours) at 4/5/2021 1923 Last data filed at 4/5/2021 0369 Gross per 24 hour Intake 2290.25 ml Output 1760 ml Net 530.25 ml Physical Exam: 
General:   Comatose, no distress, appears stated age. Eyes:   Conjunctivae/corneas clear. PERRL, EOMs intact. Ears:   Normal external ear canals bilaterally. Nose:   No drainage or sinus tenderness. Mouth/Throat:  Moist mucous membranes. Dentition normal.   
Neck:  Symmetrical, trachea midline, no JVD or carotid bruit. Back:    No CVA tenderness to percussion. Lungs:    Clear to auscultation bilaterally. Heart:   Regular rate and rhythm. S1, S2 normal, without murmur, click, rub or gallop. Abdomen:    Normoactive bowel sounds. Soft, non-tender, no masses or organomegaly. Extremities:  Atraumatic, no cyanosis or edema. Vascular:  Pulses 2+ and symmetric UE/LE bilat Skin:  Normal color, non-diaphoretic, normal capillary refill (Less than 2 seconds). No rashes or lesions. Lymph nodes:  No Lymphadenopathy. Neurologic:  Comatose with decorticate posturing RUE & BLE  
  
 
LABS AND  DATA: Personally reviewed Recent Labs 04/05/21 
6685 04/04/21 
8701 WBC 10.7 14.9* HGB 10.3* 11.2* HCT 32.4* 34.5* PLT 81* 101* Recent Labs 04/05/21 
0284 04/04/21 
3137 * 148* K 3.6 3.3*  
* 114* CO2 26 26 BUN 40* 46* CREA 1.60* 1.76* * 147* CA 8.4* 7.7* MG 2.0 1.8 PHOS 2.8 2.8 Recent Labs 04/05/21 
5495   
TP 6.1* ALB 2.8*  
GLOB 3.3 No results for input(s): INR, PTP, APTT, INREXT in the last 72 hours. No results for input(s): PHI, PCO2I, PO2I, FIO2I in the last 72 hours. No results for input(s): CPK, CKMB, TROIQ, BNPP in the last 72 hours. Ventilator Settings: 
Mode Rate Tidal Volume Pressure FiO2 PEEP Assist control   400 ml  6 cm H2O 40 % 6 cm H20 Peak airway pressure: 34 cm H2O Minute ventilation: 9.18 l/min MEDS: Reviewed RADIOLOGY: 
Mri Brain W Wo Cont Result Date: 4/5/2021 No acute findings demonstrated. Xr Chest [facility] Result Date: 4/5/2021 No significant change. Assessment:  
 
Hospital Problems  Date Reviewed: 9/3/2019 Codes Class Noted POA  
 DNR (do not resuscitate) discussion ICD-10-CM: Z71.89 ICD-9-CM: V65.49  Unknown Unknown Goals of care, counseling/discussion ICD-10-CM: Z71.89 ICD-9-CM: V65.49  Unknown Unknown Unresponsive ICD-10-CM: R41.89 ICD-9-CM: 780.09  Unknown Unknown KELLY (acute kidney injury) ([facility] [address] ICD-10-CM: N17.9 ICD-9-CM: 313. 9  Unknown Unknown Ischemic cardiomyopathy ICD-10-CM: I25.5 ICD-9-CM: 414.8  Unknown Unknown Acute respiratory failure with hypoxia (HCC) ICD-10-CM: J96.01 
ICD-9-CM: 518.81  Unknown Unknown Severe anoxic-ischemic encephalopathy (HCC) ICD-10-CM: G93.1, I67.82 
ICD-9-CM: 348.1, 437.1  3/31/2021 Yes Convulsive syncope ICD-10-CM: R55 
ICD-9-CM: 780.2, 780.39  3/31/2021 Unknown Acute alteration in mental status ICD-10-CM: R41.82 
ICD-9-CM: 780.97  3/31/2021 Yes Cardiac arrest [facility]) ICD-10-CM: I46.9 ICD-9-CM: 427.5  3/29/2021 Unknown DISPOSITION Stay in ICU 
  
CRITICAL CARE CONSULTANT NOTE I provided a face-to-face bedside physician/patient encounter, greater than the usual and customary amount normally needed, due to the high complexity of medical decision-making required. 
  
I reviewed and interpreted patient data including clinical events, labs, images, vital signs, I/O's, and examined patient.   
  
I have actively participated in multi-disciplinary discussions (Cardiology, Neurology, Radiology, CCU Nursing staff) regarding the case in formulating an optimal therapeutic plan, and effecting a management strategy for this patient. 
  
NOTE OF PERSONAL INVOLVEMENT IN CARE  
This patient has a high probability of imminent, clinically significant deterioration, which requires the highest level of preparedness to intervene urgently. I participated in the decision-making and personally managed, or directed the management of, a myriad of life and organ supporting interventions which required my frequent-interval clinical reassessments, in order to treat or prevent imminent deterioration. 
  
I personally spent 35 minutes of critical care time.  This is time spent at this critically ill patient's bedside actively involved in patient care as well as the coordination of care and discussions with the patient's family.  This does not include any procedural time which has been billed separately. 
Reyna Rhodes MD, FACS Staff AUGUST/ [address] 4/5/2021

## 2021-04-05 NOTE — PROGRESS NOTES
Cardiology Progress Note 4/5/2021    Admit Date: 3/29/2021 Admit Diagnosis: Cardiac arrest ([facility] [address] [I46.9]  CC:  None currently Assessment (per Dr Olga Jones):   
  
Out of hospital cardiac arrest and resuscitation     PEA initial , followed by V Fib and shock to rhythm and ROSC .   
Cardiogenic shock .  On Levophed and EPI . Intubated, on ventilator. EKG shows sinus rhythm , NSSTT changes.   No findings of STEMI .  
  
Recent office visit to f/u Holter which showed multiple runs of NSVT  .  Patient has previously declined ICD and again at recent office visit declined ICD. This could well have been a primary arrhythmic arrest . Cannot exclude  Vein graft occlusion .  
  
Chronic EF 20 %.    
  
  
Cath in 2009 showed severe 3 vessel disease and he underwent CABG  X 4.  
    LIMA to LAD .   SVG to RCA and PDA.    SVG to Marginal.  
 At that time his EF was 15% . 
  
S/P TAVR for Aortic Stenosis .  2017   
    
Bilateral Carotid ASO Ischemic Cardiomyopathy . Chronic systolic heart failure. Hyperlipidemia. PAD with previous peripheral interventions.   
Acute renal failure. DNR  
  
3/30  Sinus bradycardia overnight and Amiodarone was stopped. He is requiring higher doses of EPI and LEVO to support BP Creatinine up . Lactic acid elevated. Troponin up to 50 . Echo showed EF 15% at best .   4 chamber dilated heart . I have discussed with his wife here.  
  
3/31  Day 2 : He remains comatose on ventilator. He is not on any sedative and has minimal brainstem reflex response He has spiked a fever to 103. Likely a central fever but need to r/o infectious source such as Aspiration pneumonia . He is on antibiotic He is now off [place]ORD , remains on some EPI . Remains anuric . K+ is better after treatment yesterday Was acidotic earlier this AM and had 1 unit of Bicarb. Rhythm has been stable.  
  
4/1 remains comatose on ventilator.   He has some reflex responses,  Eyelids twitch to touch, some eyelid movement in response to sternal rub. Pupils small , fixed. CXR showed pulmonary edema . Still only minimal urine output, though creatinine down a little. Epi dose has come down to 6 mcgs now He's been afebrile since the spike yesterday AM . EEG result pending . CT of head 3/31 showed no change.  
  
4/2  Seems about the same . Remains comatose. Minimal reflexive response to stimulus. Has had good urine output . Creatinine a little better again CXR reviewed  Pretty reasonable . Some mild edema , no infiltrate. WBC elevated. Afebrile. Rhythm stable,   Sinus with PVCs. Still on some low dose Epi .  
  
Usual cardiologist is Dr Cullen Reasons (per Dr Andrea Byoer):    
  
Poor prognosis but stable . Just wait and see. Continue support 
  
Continue diuretic  
  
Wife at the bedside and lengthy discussion with her explaining current status and plans. Limited Prognosis . Advised her that family members should come if possible if they would like to see him before he passes. Cannot predict time course but prognosis remains poor. 4/3: BPs 110-120s; HR 60-80s; NSVT on tele. 100% on vent (40%; PEEP 5). No CXR. WBC 19; Hg 11.5; plt 113. Na 146; K 3; Cr 2 from 2.46; Ca 7.5. Cardiac meds; lasix 40 IV qday; NS @ 50. Epi @ 2 (from 4). K Rx by CC. No new cardiac recs; weaning pressors as able. D/w family member @ bedside. 4/3: BPs 72-120s; HR 60-80s; NSVT on tele. 99% on vent (40%; PEEP 5). No CXR. WBC 15; Hg 11.2; plt 101. Na 148; K 3.3; Cr 1.76 from 2 from 2.46; Ca 7.7 from 7.5. Cardiac meds; lasix 40 IV qday; NS @ 50. Epi @ 1 (from 2 from 4). K Rx by CC. No new cardiac recs; weaning pressors as able. D/w family member @ bedside. 4/5  Remains comatose on ventilator. Was on spontaneous mode , switched back to [facility] after he tired out . Temp to 100 today . WBC is down . Creat stable. Continues with urine output . Na+ is climbing,  May need some free H20 . AST elevated. He had some spontaneous L arm movements today . No signs of neurologic improvement. EEG being done this AM . Cardiac status is stable. He is now off Epi, off all pressors. HR and BP stable. Prognosis remains poor. Discussed with wife at bedside. Depending on EEG , probable need to address withdrawal with her and allow him to pass. I think she sees this outcome now and would be open to discussion . Her anxiety is relating this to other family members who are not able to see him . For other plans, see orders. High complexity decision making was performed  X Yes High-risk of decompensation with multiple organ involvement X Yes Hospital problem list  
Active Hospital Problems Diagnosis Date Noted  DNR (do not resuscitate) discussion  Goals of care, counseling/discussion  Unresponsive  KELLY (acute kidney injury) ([facility] [address]  Ischemic cardiomyopathy  Acute respiratory failure with hypoxia (HCC)  Severe anoxic-ischemic encephalopathy ([facility] [address] 03/31/2021  Convulsive syncope 03/31/2021  Acute alteration in mental status 03/31/2021  Cardiac arrest ([facility] [address] 03/29/2021 Subjective:  Patient reports  [x]   nothing; unable to communicate    [x]   intubated Chest pain  none  consistent with:  Non-cardiac CP Atypical CP None now  On going  Anginal CP Dyspnea  none  at rest  with exertion    
    improved  unchanged  worse PND  none  overnight Orthopnea  none  improved  unchanged  worse Presyncope  none  improved  unchanged  worse Ambulated in hallway without symptoms   Yes Ambulated in room without symptoms  Yes ROS Hematuria:  Yes X No Dysuria:  Yes X No  
            
(2+  other systems) Cough: Yes X No Sputum:   Yes X No  
            
 BRBPR:  Yes X No Melena: Yes X No  
No change in family and social history from H&P/Consult note. Objective:  
 Physical Exam: 
24 hr VS reviewed, overall VSSAF Temp (24hrs), Av.7 °F (37.1 °C), Min:98.3 °F (36.8 °C), Max:99.1 °F (37.3 °C) Patient Vitals for the past 8 hrs: 
 Pulse 21 0800 (!) 102  
21 0715 (!) 101  
21 0700 100  
21 0600 93  
21 0500 97  
21 0400 (!) 105  
21 0331 (!) 106  
21 0300 95  
21 0200 94  
21 0100 79 Patient Vitals for the past 8 hrs: 
 Resp  
21 0800 22  
21 0715 23  
21 0700 22  
21 0600 22  
21 0500 20  
21 0400 22  
21 0331 25  
21 0300 21  
21 0200 20  
21 0100 22 Patient Vitals for the past 8 hrs: 
 BP  
21 0800 121/76  
21 0700 131/86  
21 0600 111/71  
21 0500 119/76  
21 0400 126/81  
21 0300 115/72  
21 0200 109/69  
21 0100 114/65 Intake/Output Summary (Last 24 hours) at 2021 5483 Last data filed at 2021 6503 Gross per 24 hour Intake 3622.25 ml Output 2647 ml Net 975.25 ml General:  WD,WN  Elderly  Cachetic x NAD Agitated  Lethargic  Arousable  Obtunded Sedated  On Bipap x Intubated ENT/Palate: WNL  Dry MM  anicteric  ETT in place Respiratory: X CTA ant.lat  Nl resp effort  Increased effort  No significant change  
  rhonchi  rales  improved  worse Cardiovasc: X RRR  IRRR X Nl S1, S2 x No rub No murmur X No new murmur  Murmur c/w: x No gallop  
 x No edema  BLE edema:+  RLE edema:+  LLE edema:+ Edema less  Edema more  Edema same  Edema worse X Nl JVP  Elevated JVP  JVP same  JVP worse X Carotid wnl x abd aorta not palpated X no peripheral emboli noted GI: X abd soft x nondistended X BS present X No organo- megaly noted Skin: X Warm, dry  Cold extremites Neuro:  A/O  Grossly non- focal Obtunded  Sedated  
 x Lethargic  Arousable x intubated    
 
cath site intact w/o hematoma or new bruit; distal pulse unchanged  Yes Data Review:    
Telemetry independently reviewed x sinus  chronic afib  parox afib x NSVT 4/3 am  
 
ECG independently reviewed  NSR  afib  no significant changes  NSST-Tw chgs  
x no new ECG provided for review Lab results reviewed as noted below. Current medications reviewed as noted below. No results for input(s): PH, PCO2, PO2 in the last 72 hours. No results for input(s): CPK, CKMB, CKNDX, TROIQ in the last 72 hours. Recent Labs 04/05/21 
0619 04/04/21 2050 04/03/21 
8205 * 148* 146*  
K 3.6 3.3* 3.0*  
* 114* 113* CO2 26 26 25 BUN 40* 46* 51* CREA 1.60* 1.76* 2.04* GFRAA 51* 45* 38* * 147* 110* PHOS 2.8 2.8 3.6 CA 8.4* 7.7* 7.5* ALB 2.8*  --   --   
WBC 10.7 14.9* 19.1* HGB 10.3* 11.2* 11.5* HCT 32.4* 34.5* 35.2*  
PLT 81* 101* 113* Recent Labs 04/05/21 
5581  TBILI 2.9*  
TP 6.1* ALB 2.8*  
GLOB 3.3 No results for input(s): INR, PTP, APTT, INREXT, INREXT in the last 72 hours. No results for input(s): FE, TIBC, PSAT, FERR in the last 72 hours. Lab Results Component Value Date/Time Glucose (POC) 226 (H) 03/29/2021 11:29 AM  
 Glucose (POC) 116 (H) 01/19/2009 09:34 PM  
 Glucose (POC) 111 (H) 01/19/2009 04:33 PM  
 Glucose (POC) 115 (H) 01/19/2009 12:07 PM  
 Glucose (POC) 106 01/19/2009 08:27 AM  
 
 
Current Facility-Administered Medications Medication Dose Route Frequency  0.9% sodium chloride infusion  10 mL/hr IntraVENous CONTINUOUS  
 fentaNYL citrate (PF) injection 100 mcg  100 mcg IntraVENous Q6H PRN  
 furosemide (LASIX) injection 40 mg  40 mg IntraVENous DAILY  dextrose 5% - 0.45% NaCl with KCl 40 mEq/L infusion   IntraVENous CONTINUOUS  
 acetaminophen (TYLENOL) solution 650 mg  650 mg Per G Tube Q4H PRN  
 heparin (porcine) injection 5,000 Units  5,000 Units SubCUTAneous Q8H  
 chlorhexidine (ORAL CARE KIT) 0.12 % mouthwash 15 mL  15 mL Oral Q12H  EPINEPHrine (ADRENALIN) 5 mg in 0.9% sodium chloride 250 mL infusion  1-20 mcg/min IntraVENous TITRATE  sodium chloride (NS) flush 5-40 mL  5-40 mL IntraVENous Q8H  
 sodium chloride (NS) flush 5-40 mL  5-40 mL IntraVENous PRN  
 acetaminophen (TYLENOL) suppository 650 mg  650 mg Rectal Q6H PRN  polyethylene glycol (MIRALAX) packet 17 g  17 g Oral DAILY PRN  potassium chloride 10 mEq in 100 ml IVPB  10 mEq IntraVENous PRN  
 magnesium sulfate 2 g/50 ml IVPB (premix or compounded)  2 g IntraVENous PRN  
 famotidine (PF) (PEPCID) 20 mg in 0.9% sodium chloride 10 mL injection  20 mg IntraVENous DAILY  alcohol 62% (NOZIN) nasal  1 Ampule  1 Ampule Topical Q12H  
 balsam peru-castor oiL (VENELEX) ointment   Topical BID Arti Clayton MD

## 2021-04-05 NOTE — PROCEDURES
[address] Electroencephalogram Report Date: 04/05/2021 Patient Name: Norva Runner  YOB: 1940 Medical Record No: [id number] Procedure ID: [id number] Procedure Type: Routine INDICATION: Coma STATE: comatose IMPRESSION: 
Abnormal study. Generalized slowing. No seizures noted. Absence of seizures on this study does not exclude epilepsy. Clinical correlation is advised. DESCRIPTION OF PROCEDURE: Electrodes were applied in accordance with the international 10-20 system of electrode placement. EEG was reviewed in both bipolar and referential montages. DESCRIPTION OF FINDINGS: Background consists of symmetric 5 Hz activity that was at times interrupted by generalized slowing. Photic stimulation did not generate an appreciable drive. The background did attenuate with eye opening. No overt seizures were noted. Medications: 
Current Facility-Administered Medications Medication Dose Route Frequency  0.9% sodium chloride infusion  10 mL/hr IntraVENous CONTINUOUS  
 fentaNYL citrate (PF) injection 100 mcg  100 mcg IntraVENous Q6H PRN  potassium bicarb-citric acid (EFFER-K) tablet 40 mEq  40 mEq Per G Tube BID  furosemide (LASIX) injection 40 mg  40 mg IntraVENous DAILY  dextrose 5% - 0.45% NaCl with KCl 40 mEq/L infusion   IntraVENous CONTINUOUS  
 acetaminophen (TYLENOL) solution 650 mg  650 mg Per G Tube Q4H PRN  
 heparin (porcine) injection 5,000 Units  5,000 Units SubCUTAneous Q8H  
 chlorhexidine (ORAL CARE KIT) 0.12 % mouthwash 15 mL  15 mL Oral Q12H  EPINEPHrine (ADRENALIN) 5 mg in 0.9% sodium chloride 250 mL infusion  1-20 mcg/min IntraVENous TITRATE  sodium chloride (NS) flush 5-40 mL  5-40 mL IntraVENous Q8H  
 sodium chloride (NS) flush 5-40 mL  5-40 mL IntraVENous PRN  
 acetaminophen (TYLENOL) suppository 650 mg  650 mg Rectal Q6H PRN  polyethylene glycol (MIRALAX) packet 17 g  17 g Oral DAILY PRN  potassium chloride 10 mEq in 100 ml IVPB  10 mEq IntraVENous PRN  
 magnesium sulfate 2 g/50 ml IVPB (premix or compounded)  2 g IntraVENous PRN  
 famotidine (PF) (PEPCID) 20 mg in 0.9% sodium chloride 10 mL injection  20 mg IntraVENous DAILY  alcohol 62% (NOZIN) nasal  1 Ampule  1 Ampule Topical Q12H  
 balsam peru-castor oiL (VENELEX) ointment   Topical BID

## 2021-04-05 NOTE — PROGRESS NOTES
Music Therapy Assessment [facility] Cathi Linda [id number]    
1940  79 y.o.  male Patient Telephone Number: [phone] (home) [employer] Affiliation: No Muslim Language: [place] Patient Active Problem List  
 Diagnosis Date Noted  DNR (do not resuscitate) discussion  Goals of care, counseling/discussion  Unresponsive  KELLY (acute kidney injury) ([facility] [address]  Ischemic cardiomyopathy  Acute respiratory failure with hypoxia (HCC)  Severe anoxic-ischemic encephalopathy ([facility] [address] 03/31/2021  Convulsive syncope 03/31/2021  Acute alteration in mental status 03/31/2021  Cardiac arrest ([facility] [address] 03/29/2021  Coronary artery disease involving native coronary artery of native heart without angina pectoris 06/08/2018  S/P TAVR (transcatheter aortic valve replacement) 06/08/2018 Date: 4/5/2021            Total Time (in minutes): 5          MRM 2 CRITICAL CARE 1 Mental Status:   [  ] Alert [  ] [place] [  ]  Confused  [  ] Minimally responsive  [x] Sleeping Communication Status: [  ] Impaired Speech [  ] Nonverbal -N/A Physical Status:   [  ] Oxygen in use  [  ] Hard of Hearing [  ] Vision Impaired [  ] Ambulatory  [  ] Ambulatory with assistance [  ] Non-ambulatory -N/A Music Preferences, Background: 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 
 
Clinical Problem to be addressed: Emotional support for pt and family. Goal(s) met in session: N/A: Please see Session Observations below. Physical/Pain management (Scale of 1-10): Pre-session rating ___________    Post-session rating __________ 
[  ] Increased relaxation   [  ] Affected breathing patterns [  ] Decreased muscle tension   [  ] Decreased agitation [  ] Affected heart rate    [  ] Increased alertness Emotional/Psychological: 
[  ] Increased self-expression   [  ] Decreased aggressive behavior [  ] Decreased feelings of stress  [  ] Discussed healthy coping skills [  ] Improved mood    [  ] Decreased withdrawn behavior Social: 
[  ] Decreased feelings of isolation/loneliness [  ] Positive social interaction [  ] Provided support and/or comfort for family/friends Spiritual: 
[  ] Spiritual support    [  ] Expressed peace [  ] Expressed pauly    [  ] Discussed beliefs Techniques Utilized (Check all that apply): N/A: Please see Session Observations below. [  ] Procedural support MT [  ] Music for relaxation [  ] Patient preferred music 
[  ] Justine analysis  [  ] Song choice  [  ] Music for validation [  ] Entrainment  [  ] Movement to music [  ] Guided visualization [  ] Darrin Seed  [  ] Patient instrument playing [  ] Breezy writing [  ] Michelle Rodas along   [  ] Val Hillman  [  ] Sensory stimulation [  ] Active Listening  [  ] Music for spiritual support [  ] Making of CDs as gifts Session Observations:  F/up visit; Patient (pt) was lying in bed with his eyes closed. His spouse was standing at bedside, touching the pt's arm. As this music therapist began introducing self, the pt's spouse said she was playing recorded [address] [employer] for the pt. She declined music therapy and denied any other needs at this time. MEHDI RamosBC (Music Therapist-Board Certified) Spiritual Care Department Referral-based service

## 2021-04-05 NOTE — PROGRESS NOTES
0645 Uneventful night. Tolerating Spontaneous mode on ventilator. 0700 Bedside and Verbal shift change report given to Toyin . Report included the following information SBAR, Kardex, ED Summary, Intake/Output, MAR, Recent Results and Cardiac Rhythm NSR, BBB.

## 2021-04-05 NOTE — PROGRESS NOTES
Music Therapy Assessment [address] [id number]    
1940  79 y.o.  male Patient Telephone Number: [phone] (home) Christian Affiliation: No Church Language: [place] Patient Active Problem List  
 Diagnosis Date Noted  DNR (do not resuscitate) discussion  Goals of care, counseling/discussion  Unresponsive  KELLY (acute kidney injury) ([facility]  Ischemic cardiomyopathy  Acute respiratory failure with hypoxia (HCC)  Severe anoxic-ischemic encephalopathy ([facility] 03/31/2021  Convulsive syncope 03/31/2021  Acute alteration in mental status 03/31/2021  [facility] arrest [facility] 03/29/2021  Coronary artery disease involving native coronary artery of native heart without angina pectoris 06/08/2018  S/P TAVR (transcatheter aortic valve replacement) 06/08/2018 Date: 4/5/2021            Total Time (in minutes): 5          MRM 2 CRITICAL CARE 1 Mental Status:   [  ] Alert [  ] Narendra Speedy [  ]  Confused  [  ] Minimally responsive  [  ] Sleeping -N/A Communication Status: [  ] Impaired Speech [  ] Nonverbal -N/A Physical Status:   [  ] Oxygen in use  [  ] Hard of Hearing [  ] Vision Impaired [  ] Ambulatory  [  ] Ambulatory with assistance [  ] Non-ambulatory -N/A Music Preferences, Background: 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 
 
Clinical Problem to be addressed: Emotional support for pt and family. Goal(s) met in session: N/A: Please see Session Observations below. Physical/Pain management (Scale of 1-10): Pre-session rating ___________    Post-session rating __________ 
[  ] Increased relaxation   [  ] Affected breathing patterns [  ] Decreased muscle tension   [  ] Decreased agitation [  ] Affected heart rate    [  ] Increased alertness Emotional/Psychological: 
[  ] Increased self-expression   [  ] Decreased aggressive behavior [  ] Decreased feelings of stress  [ ] Discussed healthy coping skills [  ] Improved mood    [  ] Decreased withdrawn behavior Social: 
[  ] Decreased feelings of isolation/loneliness [  ] Positive social interaction [  ] Provided support and/or comfort for family/friends Spiritual: 
[  ] Spiritual support    [  ] Expressed peace [  ] Expressed pauly    [  ] Discussed beliefs Techniques Utilized (Check all that apply): N/A: Please see Session Observations below. [  ] Procedural support MT [  ] Music for relaxation [  ] Patient preferred music 
[  ] Justine analysis  [  ] Song choice  [  ] Music for validation [  ] Entrainment  [  ] Movement to music [  ] Guided visualization [  ] Ciara Cancel  [  ] Patient instrument playing [  ] Reynolds Counter writing [  ] Victorine Guess along   [  ] Ramon Rhodell  [  ] Sensory stimulation [  ] Active Listening  [  ] Music for spiritual support [  ] Making of CDs as gifts Session Observations:  Referral from [facility], Palliative NP. This music therapist (MT) attempted to offer music therapy for the patient (pt). The curtain was drawn in pt's room so that the MT couldn't see the pt or anyone else in the room. MT learned from another staff member on the unit that the pt was getting a bath. MT will follow at a later time to attempt again to offer music therapy. DELIA Johnson (Music Therapist-Board Certified) Spiritual Care Department Referral-based service

## 2021-04-05 NOTE — PROGRESS NOTES
0700: Bedside and Verbal shift change report given to Alana Brittle, RN (oncoming nurse) by Melida Lindo RN (offgoing nurse). Report included the following information SBAR, Kardex, Intake/Output, MAR, Accordion, Recent Results and Cardiac Rhythm SR/S tach. 8582: On assessment, patient noted to be tachypneic, labored, and tachycardic. RT notified and requested to put patient back on a rate. 0750: PRN tylenol given for fever. 0820: Dr. Capri Montez at bedside. EEG tech arrived for exam. 
 
0910: EEG completed. 0915: PRN fentanyl given. 0945: MRI screening sheet completed with patient's wife at bedside. Dr. Rogerio Faria at bedside to assess pt. 
 
1045: Dr. Rodney Rust at bedside. 1200: Reassessment. Cough noted now. 1400: Patient transported to [facility] accompanied by this RN and RT.  
 
1500: Returned from MRI, uneventful transport. 1530: During patient's bath, he vomited medium amount of bright brown emesis. Copious oral and endotracheal suctioning performed. Patient's NGT hooked to continuous suction briefly; 60mL brown output. 4mg zofran given. Dr. Rogerio Faria notified; instructed to keep patient NPO for today. 1600: Reassessment. No changes to previous. 1730: Entered room to request patient's wife to step out of room for x-ray and she sternly said \"no\". I said, \"I'm sorry? \". She stated, \"No, I don't want it. We are pulling him tomorrow\". X-ray tech left room and I requested that Dr. Rogerio Faria speak with patient's wife to clarify goals of care moving forward. 1800: Per Dr. Rogerio Faria, patient's wife would like to donate patient's body to science. [id number] [facility] [address] ragland d/t plans for withdrawal.  
 
3250: Spoke with [employer] Coordinator, Boone Salmeron, call with time of death UNLESS patient loses all reflexes between now and time of withdrawal of care; if this occurs, must call [employer] back. 1847: [facility] called for scientific donation planning.  Voicemail was left d/t after hours; requested call back tomorrow. 1900: Shift updates given to Lenka Wallace RN.

## 2021-04-05 NOTE — PROGRESS NOTES
[facility] 
 
ICU Intensivist- Critical Care Progress Note Name: Jonothan Cabot : 1940 MRN: [id number] Admit: 3/29/2021 11:25 AM   
 
Diagnosis:  
 
Principal Problem: 
  Severe anoxic-ischemic encephalopathy 
  
Problem List: 
2021: Severe anoxic-ischemic encephalopathy 2021: Convulsive syncope 2021: Acute alteration in mental status 2021: Cardiac arrest 
2018: Coronary artery disease involving native coronary artery of  
native heart without angina pectoris 2018: S/P TAVR (transcatheter aortic valve replacement) KELLY (acute kidney injury) Ischemic cardiomyopathy Acute respiratory failure with hypoxia ICU Comprehensive Plan of Care:  
 
Plans for this Shift:  
1. Anoxic brain encephalopathy after cardiac arrest- radiographic imaging follow up planned. Decorticate posturing RUE, BLE. 
  
2. Acute respiratory failure with hypoxia-  will require tracheostomy. 
  
3. Ischemic cardiomyopathy Subjective:  
 
Progress Note:  
2021  
8:41 PM  
 
Reason for ICU Admission:  
Severe anoxic-ischemic encephalopathy ([facility] Overnight Events:  
Decerebrate posturing RUE, BLE. Past Medical History:  
 
 has a past medical history of Aortic stenosis, CAD (coronary artery disease) (), Carotid stenosis, Heart failure ([facility] [address] [facility] and Hypertension. Past Surgical History:  
 
 has a past surgical history that includes pr cabg, artery-vein, four (); pr cardiac surg procedure unlist (2017); vascular surgery procedure unlist (); and hx hernia repair (). Current Medications:  
 
Current Facility-Administered Medications Medication Dose Route Frequency  furosemide (LASIX) injection 40 mg  40 mg IntraVENous DAILY  dextrose 5% - 0.45% NaCl with KCl 40 mEq/L infusion   IntraVENous CONTINUOUS  piperacillin-tazobactam (ZOSYN) 3.375 g in 0.9% sodium chloride (MBP/ADV) 100 mL MBP  3.375 g IntraVENous Q8H  
 acetaminophen (TYLENOL) solution 650 mg  650 mg Per G Tube Q4H PRN  
 heparin (porcine) injection 5,000 Units  5,000 Units SubCUTAneous Q8H  
 chlorhexidine (ORAL CARE KIT) 0.12 % mouthwash 15 mL  15 mL Oral Q12H  EPINEPHrine (ADRENALIN) 5 mg in 0.9% sodium chloride 250 mL infusion  1-20 mcg/min IntraVENous TITRATE  sodium chloride (NS) flush 5-40 mL  5-40 mL IntraVENous Q8H  
 sodium chloride (NS) flush 5-40 mL  5-40 mL IntraVENous PRN  
 acetaminophen (TYLENOL) suppository 650 mg  650 mg Rectal Q6H PRN  polyethylene glycol (MIRALAX) packet 17 g  17 g Oral DAILY PRN  potassium chloride 10 mEq in 100 ml IVPB  10 mEq IntraVENous PRN  
 magnesium sulfate 2 g/50 ml IVPB (premix or compounded)  2 g IntraVENous PRN  
 famotidine (PF) (PEPCID) 20 mg in 0.9% sodium chloride 10 mL injection  20 mg IntraVENous DAILY  alcohol 62% (NOZIN) nasal  1 Ampule  1 Ampule Topical Q12H  
 balsam peru-castor oiL (VENELEX) ointment   Topical BID Allergies/Social/Family History: Allergies Allergen Reactions  Niacin Other (comments) Severe flushing Social History Tobacco Use  Smoking status: Never Smoker  Smokeless tobacco: Never Used Substance Use Topics  Alcohol use: Yes Comment: socially Family History Problem Relation Age of Onset  Sudden Death Sister   
     cause unknown  Lung Disease Mother  Hypertension Father Review of Systems:  
 
Review of systems not obtained due to patient factors. Objective:  
Vital Signs: 
Visit Vitals /77 Pulse 71 Temp 98.5 °F (36.9 °C) Resp 18 Ht 5' 11\" (1.803 m) Wt 73.6 kg (162 lb 4.1 oz) SpO2 100% BMI 22.63 kg/m² O2 Device: Endotracheal tube, Ventilator Temp (24hrs), Av.4 °F (36.9 °C), Min:97.8 °F (36.6 °C), Max:99 °F (37.2 °C) Intake/Output:  
 
Intake/Output Summary (Last 24 hours) at 2021 Last data filed at 2021 Gross per 24 hour Intake 3777 ml Output 3072 ml Net 705 ml Physical Exam: 
General:   Comatose, no distress, appears stated age. Eyes:   Conjunctivae/corneas clear. PERRL, EOMs intact. Ears:   Normal external ear canals bilaterally. Nose:   No drainage or sinus tenderness. Mouth/Throat:  Moist mucous membranes. Dentition normal.   
Neck:  Symmetrical, trachea midline, no JVD or carotid bruit. Back:    No CVA tenderness to percussion. Lungs:    Clear to auscultation bilaterally. Heart:   Regular rate and rhythm. S1, S2 normal, without murmur, click, rub or gallop. Abdomen:    Normoactive bowel sounds. Soft, non-tender, no masses or organomegaly. Extremities:  Atraumatic, no cyanosis or edema. Vascular:  Pulses 2+ and symmetric UE/LE bilat Skin:  Normal color, non-diaphoretic, normal capillary refill (Less than 2 seconds). No rashes or lesions. Lymph nodes:  No Lymphadenopathy. Neurologic:  Comatose with decorticate posturing RUE & BLE  
  
 
 
LABS AND  DATA: Personally reviewed Recent Labs 04/04/21 
3644 04/03/21 
4291 WBC 14.9* 19.1* HGB 11.2* 11.5* HCT 34.5* 35.2*  
* 113* Recent Labs 04/04/21 
2701 04/03/21 
0850 * 146*  
K 3.3* 3.0*  
* 113* CO2 26 25 BUN 46* 51* CREA 1.76* 2.04* * 110* CA 7.7* 7.5* MG 1.8 1.9 PHOS 2.8 3.6 No results for input(s): AP, TBIL, TP, ALB, GLOB, AML, LPSE in the last 72 hours. No lab exists for component: SGOT, GPT, AMYP No results for input(s): INR, PTP, APTT, INREXT in the last 72 hours. No results for input(s): PHI, PCO2I, PO2I, FIO2I in the last 72 hours. No results for input(s): CPK, CKMB, TROIQ, BNPP in the last 72 hours. Ventilator Settings: 
Mode Rate Tidal Volume Pressure FiO2 PEEP Assist control   400 ml  6 cm H2O 40 % 5 cm H20 Peak airway pressure: 28 cm H2O Minute ventilation: 10.8 l/min MEDS: Reviewed RADIOLOGY: 
Ct Head Wo Cont Result Date: 4/4/2021 No acute intracranial abnormality.  Again noted is suggestion of loss of gray-white differentiation however this is unchanged. There is no sulcal effacement. Assessment:  
 
Hospital Problems  Date Reviewed: 9/3/2019 Codes Class Noted POA  
 DNR (do not resuscitate) discussion ICD-10-CM: Z71.89 ICD-9-CM: V65.49  Unknown Unknown Goals of care, counseling/discussion ICD-10-CM: Z71.89 ICD-9-CM: V65.49  Unknown Unknown Unresponsive ICD-10-CM: R41.89 ICD-9-CM: 780.09  Unknown Unknown KELLY (acute kidney injury) ([facility] [address] ICD-10-CM: N17.9 ICD-9-CM: 706. 9  Unknown Unknown Ischemic cardiomyopathy ICD-10-CM: I25.5 ICD-9-CM: 414.8  Unknown Unknown Acute respiratory failure with hypoxia (HCC) ICD-10-CM: J96.01 
ICD-9-CM: 518.81  Unknown Unknown Severe anoxic-ischemic encephalopathy (HCC) ICD-10-CM: G93.1, I67.82 
ICD-9-CM: 348.1, 437.1  3/31/2021 Yes Convulsive syncope ICD-10-CM: R55 
ICD-9-CM: 780.2, 780.39  3/31/2021 Unknown Acute alteration in mental status ICD-10-CM: R41.82 
ICD-9-CM: 780.97  3/31/2021 Yes Cardiac arrest [facility]) ICD-10-CM: I46.9 ICD-9-CM: 427.5  3/29/2021 Unknown DISPOSITION Stay in ICU 
  
CRITICAL CARE CONSULTANT NOTE I provided a face-to-face bedside physician/patient encounter, greater than the usual and customary amount normally needed, due to the high complexity of medical decision-making required. 
  
I reviewed and interpreted patient data including clinical events, labs, images, vital signs, I/O's, and examined patient.   
  
I have actively participated in multi-disciplinary discussions (Cardiology, Neurology, Radiology, CCU Nursing staff) regarding the case in formulating an optimal therapeutic plan, and effecting a management strategy for this patient. 
  
NOTE OF PERSONAL INVOLVEMENT IN CARE  
This patient has a high probability of imminent, clinically significant deterioration, which requires the highest level of preparedness to intervene urgently.  I participated in the decision-making and personally managed, or directed the management of, a myriad of life and organ supporting interventions which required my frequent-interval clinical reassessments, in order to treat or prevent imminent deterioration. 
  
I personally spent 35 minutes of critical care time.  This is time spent at this critically ill patient's bedside actively involved in patient care as well as the coordination of care and discussions with the patient's family.  This does not include any procedural time which has been billed separately. 
Serena Ramon MD, FACS Staff AUGUST/ [address] 4/4/2021

## 2021-04-05 NOTE — PROGRESS NOTES
Comprehensive Nutrition Assessment Type and Reason for Visit: Elizabeth Shen Nutrition Recommendations/Plan:  
Resume TF but change formula: 
 Start TwoCal HN @ 20mL/h, advance rate 10mL q 8h as tolerated to Goal Rate of 40mL/h + 150mL flush q 4h (provides 1920kcals/80gPro/210gCHO/1572mL) Recommend give prn miralax daily until pt has a BM Nutrition Assessment:   Chart reviewed, case discussed during CCU rounds. Pt remains intubated, needs re-estimated. TF on hold since Thursday 2' vomiting. OP seems to have decreasing daily since TF held and no vomiting episodes noted since Friday. Per discussion with Dr. Sari august to resume TF. Pt has been hypokalemic, will change to standard formula (especially as pt is on lasix) and monitor lytes. [place] water flushes given Na 147. TF at goal rate will meet 1074% kcal and 100% protein needs. No BM noted since 3/29, pt has PRN miralax available but this has not been given. Will ask it to be given daily until pt has a BM. Estimated Daily Nutrient Needs: 
Energy (kcal): PSU 9896 (MSJ 6136); Weight Used for Energy Requirements: Current Protein (g): 74-88g (1-1.2gPro/kg); Weight Used for Protein Requirements: Current Fluid (ml/day): 1200mL or per MD; Method Used for Fluid Requirements: Other (comment) Nutrition Related Findings:  Meds: pepcid, lasix, efferK, [email or web address]. Edema: +2 pitting-BUE, +1 pitting-BLE. BM 3/29 Wounds:   
None Current Nutrition Therapies: NPO Anthropometric Measures: 
· Height:  5' 11\" (180.3 cm) · Current Body Wt:  69.6 kg (153 lb 7 oz) · Ideal Body Wt:  172 lbs:  94.3 % · BMI Category:  Normal weight (BMI 22.0-24.9) age over 72 Nutrition Diagnosis:  
· Inadequate protein-energy intake related to impaired respiratory function as evidenced by NPO or clear liquid status due to medical condition Previous dx continues.   
 
Nutrition Interventions:  
Food and/or Nutrient Delivery: Modify tube feeding, Start tube feeding Nutrition Education and Counseling: No recommendations at this time Coordination of Nutrition Care: Continue to monitor while inpatient, Interdisciplinary rounds Goals: Pt will tolerate TF resumption with residuals <500mL, a BM and no vomiting in 2-4 days. Nutrition Monitoring and Evaluation:  
Behavioral-Environmental Outcomes: None identified Food/Nutrient Intake Outcomes: Enteral nutrition intake/tolerance Physical Signs/Symptoms Outcomes: Biochemical data, Nutrition focused physical findings, Skin, Weight, Fluid status or edema, Hemodynamic status Discharge Planning: Too soon to determine Electronically signed by Bethany Zaragoza RD, [address]  on 4/5/2021 at 12:35 PM 
 
Contact: [phone]

## 2021-04-06 NOTE — PROGRESS NOTES
Discussed plans for compassionate extubation with RN and pt's wife earlier today. He has now been extubated and appears very comfortable. Will consider whether to transfer to Hospice floor later in the day Mile King MD 
[phone] 4/6/2021 4:31 PM

## 2021-04-06 NOTE — PROGRESS NOTES
Palliative Medicine Consult Ch[place]: [phone] ([phone]) Patient Name: Donnal Primrose YOB: 1940 Date of Initial Consult: 4/2/2021 Reason for Consult: Care Decisions Requesting Provider: Julieth Atkins MD 
Primary Care Physician: Jorge Chavez MD 
 
 SUMMARY:  
Donnal Primrose is a 79 y.o. with a past history of CAD, MI, Ischemic Cardiomyopathy, HTN, CABG, and Aortic Stenosis s/p TAVR, who was admitted on 3/29/2021 from a bike ride with his wife with a diagnosis of out of hospital cardiac arrest with estimated down time of 30-60min Patient was biking with his wife when he stopped his bike, got off an collapsed on the ground. His wife reports that bystander CPR was started within two minutes. When EMS arrived, he was pulseless in PEA. He was given epi x2, then found to be in v-fib so was defibrillated x1 with ROSC. Noted Cardiology notes that patient was recently seen in the office to f/u on Holter which showed multiple runs of NSVT. He had previously declined an ICD and declined again at that visit. ECHO here shows 4 chambers dilated and LV EF of 15% with severe LV dysfunction Has been of sedation since 3/30 and remains comatose with only minimal reflexes. EEG and CT shows moderate to severe brain injury. Initial kidney injury but this is improving some and he is making urine. Social:  Mr Terrence Sparrow has been  to his wife Jessica Watson for 20+ years. She is his 3rd marriage. He is an active man, loves bike riding and hiking with his wife. He also is an avid musician- and accomplished pianist.  His wife shared that if he survives this, but is unable to play the piano, that will kill him Patient and wife identify Agnostic He has children from a previous marriage- daughter from [place] is to fly into [place] today PALLIATIVE DIAGNOSES:  
1. DNR Discussion 2. Goals of Care 3. Unresponsive 4. PEA Arrest 
5. KELLY 6. Ischemic Cardiomyopathy 7. Hypoxic respiratory failure on vent 8. End of life care PLAN:  
1. Received a call from nursing that patients wife had made a decision last night to move forward with extubation today. Ernestine Parra had already done a lot of great work getting her in touch with the [employer], as wife wanted to donate his whole body 2. I met with her along with Mitesh Florian our - Mrs Rosalie Kitchen had connected with Mitesh Florian when they met yesterday and had reached out to her last night to talk today 3. When we arrived, Ms Rosalie Kitchen was open to conversations with Mitesh Florian, so I was just there for support- she shared that she was ready to [facility] him go\" and was \"grateful for the time she got to spend with him\" here in the hospital 
4. She has a few family members coming in this morning, but will let us know when she is ready to move forward with extubation 5. Will have orders ready for when she decides to proceed 6. She did not feel that she needed us present during that time, as she has family support, but she shared that she will contact Mariel if she changes her mind 7. Will stay in contact with Ernestine Parra and be available if needed for support 8. Initial consult note routed to primary continuity provider and/or primary health care team members 9. Communicated plan of care with: Palliative [place] [facility] Team 
 
Addendum:  0649:  Received a call from [facility] that Mrs Rosalie Kitchen is ready to proceed- ordered placed for CMO and compassionate extubation Of note- thank you to Dr Capri Montez for his conversations with family during this time- he did amazing work, supporting this family through their decision making process GOALS OF CARE / TREATMENT PREFERENCES:  
 
GOALS OF CARE: 
Patient/Health Care Proxy Stated Goals: Comfort TREATMENT PREFERENCES:  
Code Status: DNR Advance Care Planning: 
[x] [facility] Interdisciplinary Team has updated the ACP Navigator with [employer] and Patient Capacity Primary Decision Maker (Active): Gavino Varghese Spouse - [phone] No flowsheet data found. Medical Interventions: Comfort measures Other Instructions: Other: As far as possible, the palliative care team has discussed with patient / health care proxy about goals of care / treatment preferences for patient. HISTORY:  
 
History obtained from: chart, wife CHIEF COMPLAINT: none HPI/SUBJECTIVE: The patient is:  
[] Verbal and participatory [x] Non-participatory due to: Unresponsive, on vent Clinical Pain Assessment (nonverbal scale for severity on nonverbal patients):  
Clinical Pain Assessment Severity: 0 Activity (Movement): Lying quietly, normal position Duration: for how long has pt been experiencing pain (e.g., 2 days, 1 month, years) Frequency: how often pain is an issue (e.g., several times per day, once every few days, constant) FUNCTIONAL ASSESSMENT:  
 
Palliative Performance Scale (PPS): PPS: 20 
 
 
 PSYCHOSOCIAL/SPIRITUAL SCREENING:  
 
Palliative IDT has assessed this patient for cultural preferences / practices and a referral made as appropriate to needs (Cultural Services, Patient Advocacy, Ethics, etc.) Any spiritual / Mandaeism concerns: 
[] Yes /  [x] No 
 
Caregiver Burnout: 
[] Yes /  [x] No /  [] No Caregiver Present Anticipatory grief assessment:  
[x] Normal  / [] Maladaptive ESAS Anxiety: Anxiety: 0 
 
ESAS Depression: Depression: 0 REVIEW OF SYSTEMS:  
 
Positive and pertinent negative findings in ROS are noted above in HPI. The following systems were [x] reviewed / [] unable to be reviewed as noted in HPI Other findings are noted below. Systems: constitutional, ears/nose/mouth/throat, respiratory, gastrointestinal, genitourinary, musculoskeletal, integumentary, neurologic, psychiatric, endocrine. Positive findings noted below. Modified ESAS Completed by: provider Fatigue: 10    
Depression: 0 Pain: 0 Anxiety: 0 Nausea: 0 Dyspnea: 0 Stool Occurrence(s): 1 PHYSICAL EXAM:  
 
From RN flowsheet: 
Wt Readings from Last 3 Encounters:  
03/29/21 162 lb 4.1 oz (73.6 kg) 04/05/21 153 lb 7 oz (69.6 kg) 09/03/19 142 lb (64.4 kg) Blood pressure (!) 126/90, pulse 92, temperature 99.8 °F (37.7 °C), resp. rate 18, height 5' 11\" (1.803 m), weight 162 lb 4.1 oz (73.6 kg), SpO2 100 %. Pain Scale 1: Behavioral Pain Scale (BPS) Pain Intensity 1: 3 Pain Intervention(s) 1: Repositioned, Other (comment), MD notified (comment)(RT notified to put patient back on rate on ventilator) Last bowel movement, if known:  
 
Constitutional: unresponsive Respiratory: on vent Skin: warm, dry HISTORY:  
 
Principal Problem: 
  Severe anoxic-ischemic encephalopathy ([facility] [address] (3/31/2021) Active Problems: 
  Cardiac arrest ([address] (3/29/2021) Convulsive syncope (3/31/2021) Acute alteration in mental status (3/31/2021) DNR (do not resuscitate) discussion () Goals of care, counseling/discussion () Unresponsive () KELLY (acute kidney injury) ([address] () Ischemic cardiomyopathy () Acute respiratory failure with hypoxia (HCC) () Past Medical History:  
Diagnosis Date  Aortic stenosis  CAD (coronary artery disease) 1989 MI  
 Carotid stenosis   
 mild bilateral  
 Heart failure ([address] ischemic cardiomyopathy  Hypertension Past Surgical History:  
Procedure Laterality Date  HX HERNIA REPAIR  2012  AR CABG, ARTERY-VEIN, FOUR  2009  AR CARDIAC SURG PROCEDURE UNLIST  06/30/2017 TAVR  VASCULAR SURGERY PROCEDURE UNLIST  2014  
 bilat popliteal stenting Family History Problem Relation Age of Onset  Sudden Death Sister   
     cause unknown  Lung Disease Mother  Hypertension Father History reviewed, no pertinent family history. Social History Tobacco Use  Smoking status: Never Smoker  Smokeless tobacco: Never Used Substance Use Topics  Alcohol use: Yes Comment: socially Allergies Allergen Reactions  Niacin Other (comments) Severe flushing Current Facility-Administered Medications Medication Dose Route Frequency  LORazepam (ATIVAN) injection 2-4 mg  2-4 mg IntraVENous Q15MIN PRN  
 glycopyrrolate (ROBINUL) injection 0.2 mg  0.2 mg IntraVENous Q4H PRN  
 scopolamine (TRANSDERM-SCOP) 1 mg over 3 days 1 Patch  1 Patch TransDERmal Q72H PRN  
 HYDROmorphone (DILAUDID) injection 1 mg  1 mg IntraVENous Q15MIN PRN  
 sodium chloride (NS) flush 5-40 mL  5-40 mL IntraVENous PRN  
 balsam peru-castor oiL (VENELEX) ointment   Topical BID  
 
 
 
 LAB AND IMAGING FINDINGS:  
 
Lab Results Component Value Date/Time WBC 10.7 04/05/2021 04:19 AM  
 HGB 10.3 (L) 04/05/2021 04:19 AM  
 PLATELET 81 (L) 45/13/4239 04:19 AM  
 
Lab Results Component Value Date/Time Sodium 147 (H) 04/06/2021 04:43 AM  
 Potassium 4.1 04/06/2021 04:43 AM  
 Chloride 116 (H) 04/06/2021 04:43 AM  
 CO2 27 04/06/2021 04:43 AM  
 BUN 42 (H) 04/06/2021 04:43 AM  
 Creatinine 1.52 (H) 04/06/2021 04:43 AM  
 Calcium 8.5 04/06/2021 04:43 AM  
 Magnesium 2.0 04/06/2021 04:43 AM  
 Phosphorus 3.0 04/06/2021 04:43 AM  
  
Lab Results Component Value Date/Time Alk. phosphatase 104 04/05/2021 04:19 AM  
 Protein, total 6.1 (L) 04/05/2021 04:19 AM  
 Albumin 2.8 (L) 04/05/2021 04:19 AM  
 Globulin 3.3 04/05/2021 04:19 AM  
 
Lab Results Component Value Date/Time INR 1.2 (H) 03/29/2021 11:34 AM  
 Prothrombin time 12.6 (H) 03/29/2021 11:34 AM  
 aPTT 24.9 03/29/2021 11:34 AM  
  
No results found for: IRON, FE, TIBC, IBCT, PSAT, FERR Lab Results Component Value Date/Time pH 7.27 (L) 03/31/2021 11:13 AM  
 PCO2 28 (L) 03/31/2021 11:13 AM  
 PO2 74 (L) 03/31/2021 11:13 AM  
 
No components found for: [place] No results found for: CPK, CKMB Total time: 70m Counseling / coordination time, spent as noted above: 60m 
> 50% counseling / coordination?: y 
 
Prolonged service was provided for  []30 min   []75 min in face to face time in the presence of the patient, spent as noted above. Time Start:  
Time End:  
Note: this can only be billed with 25808 (initial) or 14522 (follow up). If multiple start / stop times, list each separately.

## 2021-04-06 NOTE — PROGRESS NOTES
0700 Uneventful night. Bedside and Verbal shift change report given to [facility]. Report included the following information SBAR, Kardex, Intake/Output, MAR, Recent Results and Cardiac Rhythm NSR BBB.

## 2021-04-06 NOTE — INTERDISCIPLINARY ROUNDS
Interdisciplinary team rounds were held 4/6/2021 with the following team members:Care Management, Diabetes Treatment Specialist, Nursing, Nutrition, Pharmacy, Physical Therapy, Physician and Respiratory Therapy. Plan of care discussed. See clinical pathway and/or care plan for interventions and desired outcomes.

## 2021-04-06 NOTE — PROGRESS NOTES
Cardiology Progress Note 4/6/2021    Admit Date: 3/29/2021 Admit Diagnosis: Cardiac arrest ([facility] [address] [I46.9]  CC:  None currently Assessment (per Dr Naima Wilson):   
  
Out of hospital cardiac arrest and resuscitation     PEA initial , followed by V Fib and shock to rhythm and ROSC .   
Cardiogenic shock .  On Levophed and EPI . Intubated, on ventilator. EKG shows sinus rhythm , NSSTT changes.   No findings of STEMI .  
  
Recent office visit to f/u Holter which showed multiple runs of NSVT  .  Patient has previously declined ICD and again at recent office visit declined ICD. This could well have been a primary arrhythmic arrest . Cannot exclude  Vein graft occlusion .  
  
Chronic EF 20 %.    
  
  
Cath in 2009 showed severe 3 vessel disease and he underwent CABG  X 4.  
    LIMA to LAD .   SVG to RCA and PDA.    SVG to Marginal.  
 At that time his EF was 15% . 
  
S/P TAVR for Aortic Stenosis .  2017   
    
Bilateral Carotid ASO Ischemic Cardiomyopathy . Chronic systolic heart failure. Hyperlipidemia. PAD with previous peripheral interventions.   
Acute renal failure. DNR  
  
3/30  Sinus bradycardia overnight and Amiodarone was stopped. He is requiring higher doses of EPI and LEVO to support BP Creatinine up . Lactic acid elevated. Troponin up to 50 . Echo showed EF 15% at best .   4 chamber dilated heart . I have discussed with his wife here.  
  
3/31  Day 2 : He remains comatose on ventilator. He is not on any sedative and has minimal brainstem reflex response He has spiked a fever to 103. Likely a central fever but need to r/o infectious source such as Aspiration pneumonia . He is on antibiotic He is now off [place] , remains on some EPI . Remains anuric . K+ is better after treatment yesterday Was acidotic earlier this AM and had 1 unit of Bicarb. Rhythm has been stable.  
  
4/1 remains comatose on ventilator.   He has some reflex responses,  Eyelids twitch to touch, some eyelid movement in response to sternal rub. Pupils small , fixed. CXR showed pulmonary edema . Still only minimal urine output, though creatinine down a little. Epi dose has come down to 6 mcgs now He's been afebrile since the spike yesterday AM . EEG result pending . CT of head 3/31 showed no change.  
  
4/2  Seems about the same . Remains comatose. Minimal reflexive response to stimulus. Has had good urine output . Creatinine a little better again CXR reviewed  Pretty reasonable . Some mild edema , no infiltrate. WBC elevated. Afebrile. Rhythm stable,   Sinus with PVCs. Still on some low dose Epi .  
  
Usual cardiologist is Dr Korin Flores (per Dr Aliyah Recio):    
  
Poor prognosis but stable . Just wait and see. Continue support 
  
Continue diuretic  
  
Wife at the bedside and lengthy discussion with her explaining current status and plans. Limited Prognosis . Advised her that family members should come if possible if they would like to see him before he passes. Cannot predict time course but prognosis remains poor. 4/3: BPs 110-120s; HR 60-80s; NSVT on tele. 100% on vent (40%; PEEP 5). No CXR. WBC 19; Hg 11.5; plt 113. Na 146; K 3; Cr 2 from 2.46; Ca 7.5. Cardiac meds; lasix 40 IV qday; NS @ 50. Epi @ 2 (from 4). K Rx by CC. No new cardiac recs; weaning pressors as able. D/w family member @ bedside. 4/3: BPs 72-120s; HR 60-80s; NSVT on tele. 99% on vent (40%; PEEP 5). No CXR. WBC 15; Hg 11.2; plt 101. Na 148; K 3.3; Cr 1.76 from 2 from 2.46; Ca 7.7 from 7.5. Cardiac meds; lasix 40 IV qday; NS @ 50. Epi @ 1 (from 2 from 4). K Rx by CC. No new cardiac recs; weaning pressors as able. D/w family member @ bedside. 4/5  Remains comatose on ventilator. Was on spontaneous mode , switched back to [facility] after he tired out . Temp to 100 today . WBC is down . Creat stable. Continues with urine output . Na+ is climbing,  May need some free H20 . AST elevated. He had some spontaneous L arm movements today . No signs of neurologic improvement. EEG being done this AM . Cardiac status is stable. He is now off Epi, off all pressors. HR and BP stable. Prognosis remains poor. Discussed with wife at bedside. Depending on EEG , probable need to address withdrawal with her and allow him to pass. I think she sees this outcome now and would be open to discussion . Her anxiety is relating this to other family members who are not able to see him . 4/6 Stable overnight with no significant changes. Wife has come to decision to withdrawal from ventilator with understanding that he will not survive. Noted that she desires donation to [employer] and nurse Steven Mccarthy has initiated process Cardiac situation is unchanged. There is family coming later today to visit . Palliative care to re visit . Defer to Intensivist for further management  . For other plans, see orders. High complexity decision making was performed  X Yes High-risk of decompensation with multiple organ involvement X Yes Hospital problem list  
Active Hospital Problems Diagnosis Date Noted  DNR (do not resuscitate) discussion  Goals of care, counseling/discussion  Unresponsive  KELLY (acute kidney injury) ([facility] [address]  Ischemic cardiomyopathy  Acute respiratory failure with hypoxia (HCC)  Severe anoxic-ischemic encephalopathy ([facility] [address] 03/31/2021  Convulsive syncope 03/31/2021  Acute alteration in mental status 03/31/2021  Cardiac arrest ([facility] [address] 03/29/2021 Subjective:  Patient reports  [x]   nothing; unable to communicate    [x]   intubated Chest pain  none  consistent with:  Non-cardiac CP Atypical CP None now  On going  Anginal CP Dyspnea  none  at rest  with exertion    
    improved  unchanged  worse PND  none  overnight Orthopnea  none  improved  unchanged  worse Presyncope  none  improved  unchanged  worse Ambulated in hallway without symptoms   Yes Ambulated in room without symptoms  Yes ROS Hematuria:  Yes X No Dysuria:  Yes X No  
            
(2+  other systems) Cough: Yes X No Sputum: Yes X No  
            
 BRBPR:  Yes X No Melena: Yes X No  
No change in family and social history from H&P/Consult note. Objective:  
 Physical Exam: 
24 hr VS reviewed, overall VSSAF Temp (24hrs), Av.9 °F (37.2 °C), Min:98.3 °F (36.8 °C), Max:100 °F (37.8 °C) Patient Vitals for the past 8 hrs: 
 Pulse 21 0900 85  
21 0805 100  
21 0800 91  
21 0700 88  
21 0600 87  
21 0513 97  
21 0500 85  
21 0406 83  
21 0400 81  
21 0300 81 Patient Vitals for the past 8 hrs: 
 Resp  
21 0900 21  
21 0805 15  
21 0800 13  
21 0700 20  
21 0600 18  
21 0513 25  
21 0500 19  
21 0406 26  
21 0400 28  
21 0300 19 Patient Vitals for the past 8 hrs: 
 BP  
21 0900 127/74  
21 0800 116/78  
21 0700 122/69  
21 0600 121/83  
21 0500 121/78  
21 0400 119/82  
21 0300 109/68 Intake/Output Summary (Last 24 hours) at 2021 1018 Last data filed at 2021 0800 Gross per 24 hour Intake 2095 ml Output 1213 ml Net 882 ml General:  WD,WN  Elderly  Cachetic x NAD Agitated  Lethargic  Arousable  Obtunded Sedated  On Bipap x Intubated ENT/Palate: WNL  Dry MM  anicteric  ETT in place Respiratory: X CTA ant.lat  Nl resp effort  Increased effort  No significant change  
  rhonchi  rales  improved  worse Cardiovasc: X RRR  IRRR X Nl S1, S2 x No rub   No murmur X No new murmur  Murmur c/w: x No gallop  
 x No edema  BLE edema:+  RLE edema:+  LLE edema:+ Edema less  Edema more  Edema same  Edema worse X Nl JVP  Elevated JVP  JVP same  JVP worse X Carotid wnl x abd aorta not palpated X no peripheral emboli noted GI: X abd soft x nondistended X BS present X No organo- megaly noted Skin: X Warm, dry  Cold extremites Neuro:  A/O  Grossly non- focal  Obtunded  Sedated  
 x Lethargic  Arousable x intubated    
 
cath site intact w/o hematoma or new bruit; distal pulse unchanged  Yes Data Review:    
Telemetry independently reviewed x sinus  chronic afib  parox afib x NSVT 4/3 am  
 
ECG independently reviewed  NSR  afib  no significant changes  NSST-Tw chgs  
x no new ECG provided for review Lab results reviewed as noted below. Current medications reviewed as noted below. No results for input(s): PH, PCO2, PO2 in the last 72 hours. No results for input(s): CPK, CKMB, CKNDX, TROIQ in the last 72 hours. Recent Labs 04/06/21 
5838 04/05/21 
7635 04/04/21 
4730 * 147* 148* K 4.1 3.6 3.3*  
* 115* 114* CO2 27 26 26 BUN 42* 40* 46* CREA 1.52* 1.60* 1.76* GFRAA 54* 51* 45* * 115* 147* PHOS 3.0 2.8 2.8  
CA 8.5 8.4* 7.7* ALB  --  2.8*  --   
WBC  --  10.7 14.9* HGB  --  10.3* 11.2* HCT  --  32.4* 34.5*  
PLT  --  81* 101* Recent Labs 04/05/21 
3593  TBILI 2.9*  
TP 6.1* ALB 2.8*  
GLOB 3.3 No results for input(s): INR, PTP, APTT, INREXT, INREXT in the last 72 hours. No results for input(s): FE, TIBC, PSAT, FERR in the last 72 hours. Lab Results Component Value Date/Time Glucose (POC) 226 (H) 03/29/2021 11:29 AM  
 Glucose (POC) 116 (H) 01/19/2009 09:34 PM  
 Glucose (POC) 111 (H) 01/19/2009 04:33 PM  
 Glucose (POC) 115 (H) 01/19/2009 12:07 PM  
 Glucose (POC) 106 01/19/2009 08:27 AM  
 
 
Current Facility-Administered Medications Medication Dose Route Frequency  0.9% sodium chloride infusion  10 mL/hr IntraVENous CONTINUOUS  
 dextrose 5% - 0.45% NaCl with KCl 40 mEq/L infusion   IntraVENous CONTINUOUS  
 acetaminophen (TYLENOL) solution 650 mg  650 mg Per G Tube Q4H PRN  
 heparin (porcine) injection 5,000 Units  5,000 Units SubCUTAneous Q8H  
 chlorhexidine (ORAL CARE KIT) 0.12 % mouthwash 15 mL  15 mL Oral Q12H  EPINEPHrine (ADRENALIN) 5 mg in 0.9% sodium chloride 250 mL infusion  1-20 mcg/min IntraVENous TITRATE  sodium chloride (NS) flush 5-40 mL  5-40 mL IntraVENous Q8H  
 sodium chloride (NS) flush 5-40 mL  5-40 mL IntraVENous PRN  
 acetaminophen (TYLENOL) suppository 650 mg  650 mg Rectal Q6H PRN  polyethylene glycol (MIRALAX) packet 17 g  17 g Oral DAILY PRN  potassium chloride 10 mEq in 100 ml IVPB  10 mEq IntraVENous PRN  
 magnesium sulfate 2 g/50 ml IVPB (premix or compounded)  2 g IntraVENous PRN  
 famotidine (PF) (PEPCID) 20 mg in 0.9% sodium chloride 10 mL injection  20 mg IntraVENous DAILY  alcohol 62% (NOZIN) nasal  1 Ampule  1 Ampule Topical Q12H  
 balsam peru-castor oiL (VENELEX) ointment   Topical BID Johanny Shen MD

## 2021-04-06 NOTE — PROGRESS NOTES
Palliative Medicine [place]: [phone] ([phone]) [facility]: [phone] ([phone]) 8:30 am : Telephone message received from wife Kofi Wells, who shared that she was planning to extubate patient today and wanted our team to know that she had made this decision. Tried calling her back at phone numbers left, but no answer. Palliative team of Luc Guerrero NP and this writer met with Kofi Wells to offer support and answer any questions that she had regarding her decision. She shared that she got results of the MRI and because that showed irreversible brain damage, her decision was made. She also noted that there were some family members that were going to come today to see patient and she would wait until after their visit. Patient's daughter was not going to come back, per Kofi Wells. Palliative team shared protocol for extubation. Kofi Wells reassured when she heard that when procedure is actually done that family is asked to step out of the room, this was a relief to her. \"I don't know how I'm going to feel\". Permission given for Kofi Wells to allow herself to have any feelings that do arise. Support offered if she needs it,  support declined. Kofi Wells noted that she has our contact number and will call us if needed.

## 2021-04-06 NOTE — PROGRESS NOTES
0700: Shift updates received at bedside from [address] 
 
1910: Shift assessment completed. 0815: Patient's wife arrived to bedside. 6120: [facility] [address] Program contacted for possible donation purposes. Spoke with Xavier Mitchell. 0845: Spoke with Arden Finch NP from Palliative medicine to inform of patient's wife's plans to withdraw care today. 1200: Reassessment. No changes to previous. See flowsheet for details. 1500: Patient's wife informed me she was ready to withdraw care. Attempted to contact palliative team for orders. 1540: Patient pre-medicated with ativan, dilaudid, and robinul. Comfort measures initiated. RT at bedside to extubate patient. 1545: Patient extubated to 2L nasal cannula. 1600: Patient appears comfortable following additional medication administration of ativan and dilaudid; unlabored respirations. 1700: Family remains at bedside. 1815: Dr. Michi Grant at bedside visiting with family. 1900: Shift report given to Marce Moran RN.

## 2021-04-07 NOTE — PROGRESS NOTES
The patient has remained comfortable on RA with SpO2 88%. He is requiring infrequent PRN morphine and lorazepam. The following serves as an interim DC summary: 
 
   
 
DISCHARGE SUMMARY Name: Haider Hawley : 1940 MRN: [id number] Date: 2021 DATE OF ADMISSION: 21 DATE OF DISCHARGE/DEATH: 
 
 
ADMISSION DIAGNOSES: 
Out of hospital cardiac arrest 
S/P prolonged CPR Cardiogenic shock Acute respiratory failure post arrest 
 
 
DISCHARGE DIAGNOSES:  
Out of hospital cardiac arrest 
S/P prolonged CPR Cardiogenic shock, resolved S/P TAVR 2018 Acute respiratory failure post arrest 
KELLY Ischemic cardiomyopathy Severe post-anoxic encephalopathy BRIEF SUMMARY OF ADMISSION: 
Pt was admitted by the [facility] service with the following HPI and assessment and plan: 
Subjective: 80 y/o M found down after a bike ride. Initially PEA, received epi. Convert to V-fib and received 1 defibrillation. ROSC en route to the ED. Estimated down time was 30-60 mins. Plan: 1. Admit to ICU 2. Vaso pressors as needed for hypotension 3. Review CT scans 4. Discussion with the family about goals of care. HOSPITAL COURSE: 
Admitted via ED after prolonged out of hospital cardiac arrest suffered after riding his bike. ROSC achieved by EMS en route to hospital. Intubated in ED 
CT head : No acute findings Echocardiogram : Estimated LVEF is 15 - 20%. Moderately dilated left ventricle. Increased wall thickness. Severely reduced systolic function CT chest/abd/pelvis : Trace bilateral pleural effusions. Trace free peritoneal fluid in perihepatic 
perisplenic spaces. 2. Endotracheal tube and transesophageal tube. 3. Moderate cardiac contour enlargement with congestive heart failure evidenced by reflux of contrast into the IVC and hepatic veins. 4. Mild pulmonary edema. 5. Small gallstones. 6. Dunlap catheter within urinary bladder. 7. Small left inguinal hernia containing fat.  8. Acute fracture anterior left fourth rib Cardiology consultation 03/29 Neurology consultation 03/31 EEG 03/31: wide spread generalized slow wave activity but showing no focal slowing or spike or spike-and-wave discharges being seen. The study is consistent with diffuse encephalopathy of toxic, metabolic or degenerative type. Palliative Care consultation 04/02 MRI brain 04/05: No acute findings demonstrated EEG 04/05: Background consists of symmetric 5 Hz activity that was at times interrupted by generalized slowing. Photic stimulation did not generate an appreciable drive. The background did attenuate with eye opening. No overt seizures were noted He was supported on mechanical ventilation throughout the duration of his hospitalization until compassionate extubation was undertaken on 04/06. He required no sedation medications from 04/01 through 04/05 and remained comatose. He was deemed to have a poor prognosis for neurological recovery by Neurology and the decision was made for compassionate extubation which was undertaken 04/06. He remained on RA requiring only infrequent intermittent boluses of morphine and lorazepam in response to tachypnea. On 04/07, he was transferred to the Hospice floor and the Hospitalist Service Mile King MD 
[phone] 4/7/2021 12:36 PM

## 2021-04-07 NOTE — PROGRESS NOTES
0300- Report received from [facility] [place]. 0400- Assessment complete, see flowsheet for details. 0700- Report given to Isis Molina RN.

## 2021-04-07 NOTE — PROGRESS NOTES
He has now moved to first floor for Comfort care. VSS . No change in status. Wife with him through the day . She met with Hospice team and is reviewing materials to try to understand the process.

## 2021-04-07 NOTE — PROGRESS NOTES
1900: Bedside report received from [address]: Shift assessment completed, see flowsheets. Family leaving for the night. Will call with any updates. Patient is comfort measures only 
 
0000: Reassessment complete. See flowsheets 0300: Report given to Radha Escobar RN

## 2021-04-07 NOTE — PROGRESS NOTES
Transition of Care Plan: 
 
RUR: 14% Disposition: GIP hospice Follow up appointments: N/A 
DME needed: N/A Transportation at Discharge: N/A Keys or means to access home: yes if discharged home IM Medicare letter: to be reviewed prior to d/c Caregiver Contact: Graciela Brown (spouse) [phone] Discharge Caregiver contacted prior to discharge? yes Chart reviewed. Consult received for hospice. CM sent referral via [facility] to [facility]. Pt is now being admitted to inpatient hospice at this time. CM will continue to follow and be available should d/c planning needs arise. Everett David MSW Care Manager, [id number] [address] 
[phone]

## 2021-04-07 NOTE — PROGRESS NOTES
Pt transferred from ICU. Pt seen and examined. Wife was at bedside. Comfort measures only. Spoke to pt's wife about hospice consultation. She is in agreement. I spoke to hospice team who will see pt.

## 2021-04-07 NOTE — PROGRESS NOTES
Palliative Medicine [place]: [phone] ([phone]) [facility]: [phone] ([phone]) Met with wife Ira Mancera and her brother Nathan (who is here from [place] and was with her). Patient is breathing comfortably, not in any distress, has medications on board to manage symptoms and keep him comfortable. Kofi Zacarias is coping well emotionally. She is with patient most of the time, during the day. 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 She is beginning to think about some of the responsibilities that will now be hers. Support provided and engaged in some life review. The couple were avid cyclists and had planned a cycling trip to [facility] this year. Discussed hospice with Kofi Zacarias, explained the benefits and how they could support her here and at home following loss. Also explained how this would be the next progression for care for patient. Also discussed home vs GIP and that hospice team would evaluate appropriateness of whether patient is GIP or not. Kofi Zacarias does not feel she would want to take patient home in this state. Kofi Zacarias understands hospice and would like to wait \"a little\" before meeting with them. Offered information visit as well, but this was declined today. Do not think she will be opposed to it and let her know that hospitalist will likely discuss this as well.  
 
Care co-ordination with CM and Palliative team.

## 2021-04-07 NOTE — PROGRESS NOTES
Patient's spouse stated the patient's body is to be donated to [facility].  
 
[email or web address]

## 2021-04-07 NOTE — PROGRESS NOTES
Cardiology Progress Note 4/7/2021    Admit Date: 3/29/2021 Admit Diagnosis: Cardiac arrest ([facility] [address] [I46.9]  CC:  None currently Assessment (per Dr Ephraim Live):   
  
Out of hospital cardiac arrest and resuscitation     PEA initial , followed by V Fib and shock to rhythm and ROSC .   
Cardiogenic shock .  On Levophed and EPI . Intubated, on ventilator. EKG shows sinus rhythm , NSSTT changes.   No findings of STEMI .  
  
Recent office visit to f/u Holter which showed multiple runs of NSVT  .  Patient has previously declined ICD and again at recent office visit declined ICD. This could well have been a primary arrhythmic arrest . Cannot exclude  Vein graft occlusion .  
  
Chronic EF 20 %.    
  
  
Cath in 2009 showed severe 3 vessel disease and he underwent CABG  X 4.  
    LIMA to LAD .   SVG to RCA and PDA.    SVG to Marginal.  
 At that time his EF was 15% . 
  
S/P TAVR for Aortic Stenosis .  2017   
    
Bilateral Carotid ASO Ischemic Cardiomyopathy . Chronic systolic heart failure. Hyperlipidemia. PAD with previous peripheral interventions.   
Acute renal failure. DNR  
  
3/30  Sinus bradycardia overnight and Amiodarone was stopped. He is requiring higher doses of EPI and LEVO to support BP Creatinine up . Lactic acid elevated. Troponin up to 50 . Echo showed EF 15% at best .   4 chamber dilated heart . I have discussed with his wife here.  
  
3/31  Day 2 : He remains comatose on ventilator. He is not on any sedative and has minimal brainstem reflex response He has spiked a fever to 103. Likely a central fever but need to r/o infectious source such as Aspiration pneumonia . He is on antibiotic He is now off [place] , remains on some EPI . Remains anuric . K+ is better after treatment yesterday Was acidotic earlier this AM and had 1 unit of Bicarb. Rhythm has been stable.  
  
4/1 remains comatose on ventilator.   He has some reflex responses,  Eyelids twitch to touch, some eyelid movement in response to sternal rub. Pupils small , fixed. CXR showed pulmonary edema . Still only minimal urine output, though creatinine down a little. Epi dose has come down to 6 mcgs now He's been afebrile since the spike yesterday AM . EEG result pending . CT of head 3/31 showed no change.  
  
4/2  Seems about the same . Remains comatose. Minimal reflexive response to stimulus. Has had good urine output . Creatinine a little better again CXR reviewed  Pretty reasonable . Some mild edema , no infiltrate. WBC elevated. Afebrile. Rhythm stable,   Sinus with PVCs. Still on some low dose Epi .  
  
Usual cardiologist is Dr Spring Chappell (per Dr Belinda Gage):    
  
Poor prognosis but stable . Just wait and see. Continue support 
  
Continue diuretic  
  
Wife at the bedside and lengthy discussion with her explaining current status and plans. Limited Prognosis . Advised her that family members should come if possible if they would like to see him before he passes. Cannot predict time course but prognosis remains poor. 4/3: BPs 110-120s; HR 60-80s; NSVT on tele. 100% on vent (40%; PEEP 5). No CXR. WBC 19; Hg 11.5; plt 113. Na 146; K 3; Cr 2 from 2.46; Ca 7.5. Cardiac meds; lasix 40 IV qday; NS @ 50. Epi @ 2 (from 4). K Rx by CC. No new cardiac recs; weaning pressors as able. D/w family member @ bedside. 4/3: BPs 72-120s; HR 60-80s; NSVT on tele. 99% on vent (40%; PEEP 5). No CXR. WBC 15; Hg 11.2; plt 101. Na 148; K 3.3; Cr 1.76 from 2 from 2.46; Ca 7.7 from 7.5. Cardiac meds; lasix 40 IV qday; NS @ 50. Epi @ 1 (from 2 from 4). K Rx by CC. No new cardiac recs; weaning pressors as able. D/w family member @ bedside. 4/5  Remains comatose on ventilator. Was on spontaneous mode , switched back to [facility] after he tired out . Temp to 100 today . WBC is down . Creat stable. Continues with urine output . Na+ is climbing,  May need some free H20 . AST elevated. He had some spontaneous L arm movements today . No signs of neurologic improvement. EEG being done this AM . Cardiac status is stable. He is now off Epi, off all pressors. HR and BP stable. Prognosis remains poor. Discussed with wife at bedside. Depending on EEG , probable need to address withdrawal with her and allow him to pass. I think she sees this outcome now and would be open to discussion . Her anxiety is relating this to other family members who are not able to see him . 4/6 Stable overnight with no significant changes. Wife has come to decision to withdrawal from ventilator with understanding that he will not survive. Noted that she desires donation to [facility] and nurse Loretta Roberts has initiated process Cardiac situation is unchanged. There is family coming later today to visit . Palliative care to re visit . Defer to Intensivist for further management  . 4/7 Extubated yesterday and has continued to have adequate  VSS with NSR 80 - 90,   RR ~ 15 ,  /80 . Remains comatose. Wife at bedside. Plans in works to transfer to floor, palliative / hospice care. For other plans, see orders. High complexity decision making was performed  X Yes High-risk of decompensation with multiple organ involvement X Yes Hospital problem list  
Active Hospital Problems Diagnosis Date Noted  End of life care  DNR (do not resuscitate) discussion  Goals of care, counseling/discussion  Unresponsive  KELLY (acute kidney injury) ([facility] [address]  Ischemic cardiomyopathy  Acute respiratory failure with hypoxia (HCC)  Severe anoxic-ischemic encephalopathy ([facility] [address] 03/31/2021  Convulsive syncope 03/31/2021  Acute alteration in mental status 03/31/2021  Cardiac arrest ([facility] [address] 03/29/2021 Subjective:  Patient reports  [x]   nothing; unable to communicate    [x]   intubated Chest pain  none  consistent with:  Non-cardiac CP Atypical CP None now  On going  Anginal CP Dyspnea  none  at rest  with exertion    
    improved  unchanged  worse PND  none  overnight Orthopnea  none  improved  unchanged  worse Presyncope  none  improved  unchanged  worse Ambulated in hallway without symptoms   Yes Ambulated in room without symptoms  Yes ROS Hematuria:  Yes X No Dysuria:  Yes X No  
            
(2+  other systems) Cough: Yes X No Sputum: Yes X No  
            
 BRBPR:  Yes X No Melena: Yes X No  
No change in family and social history from H&P/Consult note. Objective:  
 Physical Exam: 
24 hr VS reviewed, overall VSSAF Temp (24hrs), Av °F (37.2 °C), Min:98.2 °F (36.8 °C), Max:99.8 °F (37.7 °C) Patient Vitals for the past 8 hrs: 
 Pulse 21 0800 91  
21 0700 86  
21 0600 77  
21 0500 78  
21 0400 77  
21 0300 78  
21 0200 77  
21 0100 78 Patient Vitals for the past 8 hrs: 
 Resp  
21 0800 23  
21 0700 24  
21 0600 13  
21 0500 18  
21 0400 17  
21 0300 15  
21 0200 17  
21 0100 17 Patient Vitals for the past 8 hrs: 
 BP  
21 0800 122/81  
21 0700 121/78  
21 0600 116/68  
21 0500 122/76  
21 0400 109/75  
21 0300 110/66  
21 0200 111/67  
21 0100 106/66 Intake/Output Summary (Last 24 hours) at 2021 4017 Last data filed at 2021 0400 Gross per 24 hour Intake 723.33 ml Output 810 ml Net -86.67 ml General:  WD,WN  Elderly  Cachetic x NAD Agitated  Lethargic  Arousable  Obtunded Sedated  On Bipap x Intubated ENT/Palate: WNL  Dry MM  anicteric  ETT in place Respiratory: X CTA ant.lat  Nl resp effort  Increased effort  No significant change  
  rhonchi  rales  improved  worse Cardiovasc: X RRR  IRRR X Nl S1, S2 x No rub No murmur X No new murmur  Murmur c/w: x No gallop  
 x No edema  BLE edema:+  RLE edema:+  LLE edema:+ Edema less  Edema more  Edema same  Edema worse X Nl JVP  Elevated JVP  JVP same  JVP worse X Carotid wnl x abd aorta not palpated X no peripheral emboli noted GI: X abd soft x nondistended X BS present X No organo- megaly noted Skin: X Warm, dry  Cold extremites Neuro:  A/O  Grossly non- focal  Obtunded  Sedated  
 x Lethargic  Arousable x intubated    
 
cath site intact w/o hematoma or new bruit; distal pulse unchanged  Yes Data Review:    
Telemetry independently reviewed x sinus  chronic afib  parox afib x NSVT 4/3 am  
 
ECG independently reviewed  NSR  afib  no significant changes  NSST-Tw chgs  
x no new ECG provided for review Lab results reviewed as noted below. Current medications reviewed as noted below. No results for input(s): PH, PCO2, PO2 in the last 72 hours. No results for input(s): CPK, CKMB, CKNDX, TROIQ in the last 72 hours. Recent Labs 04/06/21 
3857 04/05/21 
3285 * 147* K 4.1 3.6 * 115* CO2 27 26 BUN 42* 40* CREA 1.52* 1.60* GFRAA 54* 51* * 115* PHOS 3.0 2.8 CA 8.5 8.4* ALB  --  2.8* WBC  --  10.7 HGB  --  10.3* HCT  --  32.4* PLT  --  81* Recent Labs 04/05/21 
9036  TBILI 2.9*  
TP 6.1* ALB 2.8*  
GLOB 3.3 No results for input(s): INR, PTP, APTT, INREXT, INREXT in the last 72 hours. No results for input(s): FE, TIBC, PSAT, FERR in the last 72 hours. Lab Results Component Value Date/Time  Glucose (POC) 226 (H) 03/29/2021 11:29 AM  
 Glucose (POC) 116 (H) 01/19/2009 09:34 PM  
 Glucose (POC) 111 (H) 01/19/2009 04:33 PM  
 Glucose (POC) 115 (H) 01/19/2009 12:07 PM  
 Glucose (POC) 106 01/19/2009 08:27 AM  
 
 
Current Facility-Administered Medications Medication Dose Route Frequency  LORazepam (ATIVAN) injection 2-4 mg  2-4 mg IntraVENous Q15MIN PRN  
 glycopyrrolate (ROBINUL) injection 0.2 mg  0.2 mg IntraVENous Q4H PRN  
 scopolamine (TRANSDERM-SCOP) 1 mg over 3 days 1 Patch  1 Patch TransDERmal Q72H PRN  
 HYDROmorphone (DILAUDID) injection 1 mg  1 mg IntraVENous Q15MIN PRN  
 sodium chloride (NS) flush 5-40 mL  5-40 mL IntraVENous PRN  
 balsam peru-castor oiL (VENELEX) ointment   Topical BID Shy Hernández MD

## 2021-04-08 NOTE — PROGRESS NOTES
Cardiology Progress Note 4/8/2021    Admit Date: 3/29/2021 Admit Diagnosis: Cardiac arrest ([facility] [address] [I46.9]  CC:  None currently Assessment (per Dr Sadler Sic):   
  
Out of hospital cardiac arrest and resuscitation     PEA initial , followed by V Fib and shock to rhythm and ROSC .   
Cardiogenic shock .  On Levophed and EPI . Intubated, on ventilator. EKG shows sinus rhythm , NSSTT changes.   No findings of STEMI .  
  
Recent office visit to f/u Holter which showed multiple runs of NSVT  .  Patient has previously declined ICD and again at recent office visit declined ICD. This could well have been a primary arrhythmic arrest . Cannot exclude  Vein graft occlusion .  
  
Chronic EF 20 %.    
  
  
Cath in 2009 showed severe 3 vessel disease and he underwent CABG  X 4.  
    LIMA to LAD .   SVG to RCA and PDA.    SVG to Marginal.  
 At that time his EF was 15% . 
  
S/P TAVR for Aortic Stenosis .  2017   
    
Bilateral Carotid ASO Ischemic Cardiomyopathy . Chronic systolic heart failure. Hyperlipidemia. PAD with previous peripheral interventions.   
Acute renal failure. DNR  
  
3/30  Sinus bradycardia overnight and Amiodarone was stopped. He is requiring higher doses of EPI and LEVO to support BP Creatinine up . Lactic acid elevated. Troponin up to 50 . Echo showed EF 15% at best .   4 chamber dilated heart . I have discussed with his wife here.  
  
3/31  Day 2 : He remains comatose on ventilator. He is not on any sedative and has minimal brainstem reflex response He has spiked a fever to 103. Likely a central fever but need to r/o infectious source such as Aspiration pneumonia . He is on antibiotic He is now off [place] , remains on some EPI . Remains anuric . K+ is better after treatment yesterday Was acidotic earlier this AM and had 1 unit of Bicarb. Rhythm has been stable.  
  
4/1 remains comatose on ventilator.   He has some reflex responses,  Eyelids twitch to touch, some eyelid movement in response to sternal rub. Pupils small , fixed. CXR showed pulmonary edema . Still only minimal urine output, though creatinine down a little. Epi dose has come down to 6 mcgs now He's been afebrile since the spike yesterday AM . EEG result pending . CT of head 3/31 showed no change.  
  
4/2  Seems about the same . Remains comatose. Minimal reflexive response to stimulus. Has had good urine output . Creatinine a little better again CXR reviewed  Pretty reasonable . Some mild edema , no infiltrate. WBC elevated. Afebrile. Rhythm stable,   Sinus with PVCs. Still on some low dose Epi .  
  
Usual cardiologist is Dr Lidia Olson (per Dr John Monson):    
  
Poor prognosis but stable . Just wait and see. Continue support 
  
Continue diuretic  
  
Wife at the bedside and lengthy discussion with her explaining current status and plans. Limited Prognosis . Advised her that family members should come if possible if they would like to see him before he passes. Cannot predict time course but prognosis remains poor. 4/3: BPs 110-120s; HR 60-80s; NSVT on tele. 100% on vent (40%; PEEP 5). No CXR. WBC 19; Hg 11.5; plt 113. Na 146; K 3; Cr 2 from 2.46; Ca 7.5. Cardiac meds; lasix 40 IV qday; NS @ 50. Epi @ 2 (from 4). K Rx by CC. No new cardiac recs; weaning pressors as able. D/w family member @ bedside. 4/3: BPs 72-120s; HR 60-80s; NSVT on tele. 99% on vent (40%; PEEP 5). No CXR. WBC 15; Hg 11.2; plt 101. Na 148; K 3.3; Cr 1.76 from 2 from 2.46; Ca 7.7 from 7.5. Cardiac meds; lasix 40 IV qday; NS @ 50. Epi @ 1 (from 2 from 4). K Rx by CC. No new cardiac recs; weaning pressors as able. D/w family member @ bedside. 4/5  Remains comatose on ventilator. Was on spontaneous mode , switched back to [facility] after he tired out . Temp to 100 today . WBC is down . Creat stable. Continues with urine output . Na+ is climbing,  May need some free H20 . AST elevated. He had some spontaneous L arm movements today . No signs of neurologic improvement. EEG being done this AM . Cardiac status is stable. He is now off Epi, off all pressors. HR and BP stable. Prognosis remains poor. Discussed with wife at bedside. Depending on EEG , probable need to address withdrawal with her and allow him to pass. I think she sees this outcome now and would be open to discussion . Her anxiety is relating this to other family members who are not able to see him . 4/6 Stable overnight with no significant changes. Wife has come to decision to withdrawal from ventilator with understanding that he will not survive. Noted that she desires donation to [facility] and nurse Gonzalo Rojo has initiated process Cardiac situation is unchanged. There is family coming later today to visit . Palliative care to re visit . Defer to Intensivist for further management  . 4/7 Extubated yesterday and has continued to have adequate  VSS with NSR 80 - 90,   RR ~ 15 ,  /80 . Remains comatose. Wife at bedside. Plans in works to transfer to floor, palliative / hospice care. 4/8 No clinical change. Pupils are pin point . VSS   RR ~ 20 . P and BP stable. No response to stimulus. Nurse reports medication being given PRN for comfort. Wife at bedside. She has questions about the process of Hospice care. For other plans, see orders. High complexity decision making was performed  X Yes High-risk of decompensation with multiple organ involvement X Yes Hospital problem list  
Active Hospital Problems Diagnosis Date Noted  End of life care  DNR (do not resuscitate) discussion  Goals of care, counseling/discussion  Unresponsive  KELLY (acute kidney injury) ([facility] [address]  Ischemic cardiomyopathy  Acute respiratory failure with hypoxia ([facility] [address]  Severe anoxic-ischemic encephalopathy ([facility] [address] 2021  Convulsive syncope 2021  Acute alteration in mental status 2021  Cardiac arrest ([facility] [address] 2021 Subjective:  Patient reports  [x]   nothing; unable to communicate    [x]   intubated Chest pain  none  consistent with:  Non-cardiac CP Atypical CP None now  On going  Anginal CP Dyspnea  none  at rest  with exertion    
    improved  unchanged  worse PND  none  overnight Orthopnea  none  improved  unchanged  worse Presyncope  none  improved  unchanged  worse Ambulated in hallway without symptoms   Yes Ambulated in room without symptoms  Yes ROS Hematuria:  Yes X No Dysuria:  Yes X No  
            
(2+  other systems) Cough: Yes X No Sputum: Yes X No  
            
 BRBPR:  Yes X No Melena: Yes X No  
No change in family and social history from H&P/Consult note. Objective:  
 Physical Exam: 
24 hr VS reviewed, overall VSSAF Temp (24hrs), Av.2 °F (37.3 °C), Min:99.2 °F (37.3 °C), Max:99.2 °F (37.3 °C) Patient Vitals for the past 8 hrs: 
 Pulse 21 0735 73 Patient Vitals for the past 8 hrs: 
 Resp  
21 0735 13 Patient Vitals for the past 8 hrs: 
 BP  
21 0735 (!) 88/60 Intake/Output Summary (Last 24 hours) at 2021 1049 Last data filed at 2021 Gross per 24 hour Intake  Output 525 ml Net -525 ml General:  WD,WN  Elderly  Cachetic x NAD Agitated  Lethargic  Arousable  Obtunded Sedated  On Bipap x Intubated ENT/Palate: WNL  Dry MM  anicteric  ETT in place Respiratory: X CTA ant.lat  Nl resp effort  Increased effort  No significant change  
  rhonchi  rales  improved  worse Cardiovasc: X RRR  IRRR X Nl S1, S2 x No rub   No murmur X No new murmur  Murmur c/w: x No gallop  
 x No edema  BLE edema:+  RLE edema:+  LLE edema:+ Edema less  Edema more  Edema same  Edema worse X Nl JVP  Elevated JVP  JVP same  JVP worse X Carotid wnl x abd aorta not palpated X no peripheral emboli noted GI: X abd soft x nondistended X BS present X No organo- megaly noted Skin: X Warm, dry  Cold extremites Neuro:  A/O  Grossly non- focal  Obtunded  Sedated  
 x Lethargic  Arousable x intubated    
 
cath site intact w/o hematoma or new bruit; distal pulse unchanged  Yes Data Review:    
Telemetry independently reviewed x sinus  chronic afib  parox afib x NSVT 4/3 am  
 
ECG independently reviewed  NSR  afib  no significant changes  NSST-Tw chgs  
x no new ECG provided for review Lab results reviewed as noted below. Current medications reviewed as noted below. No results for input(s): PH, PCO2, PO2 in the last 72 hours. No results for input(s): CPK, CKMB, CKNDX, TROIQ in the last 72 hours. Recent Labs 04/06/21 
1737 * K 4.1 * CO2 27 BUN 42* CREA 1.52* GFRAA 54* * PHOS 3.0  
CA 8.5 No results for input(s): AP, TBIL, TBILI, TP, ALB, GLOB, GGT, AML, LPSE in the last 72 hours. No lab exists for component: SGOT, GPT, AMYP, HLPSE No results for input(s): INR, PTP, APTT, INREXT, INREXT in the last 72 hours. No results for input(s): FE, TIBC, PSAT, FERR in the last 72 hours. Lab Results Component Value Date/Time Glucose (POC) 226 (H) 03/29/2021 11:29 AM  
 Glucose (POC) 116 (H) 01/19/2009 09:34 PM  
 Glucose (POC) 111 (H) 01/19/2009 04:33 PM  
 Glucose (POC) 115 (H) 01/19/2009 12:07 PM  
 Glucose (POC) 106 01/19/2009 08:27 AM  
 
 
Current Facility-Administered Medications Medication Dose Route Frequency  HYDROmorphone (DILAUDID) injection 1 mg  1 mg IntraVENous Q4H  
 LORazepam (ATIVAN) injection 2-4 mg  2-4 mg IntraVENous Q15MIN PRN  
 glycopyrrolate (ROBINUL) injection 0.2 mg  0.2 mg IntraVENous Q4H PRN  
 scopolamine (TRANSDERM-SCOP) 1 mg over 3 days 1 Patch  1 Patch TransDERmal Q72H PRN  
 HYDROmorphone (DILAUDID) injection 1 mg  1 mg IntraVENous Q15MIN PRN  
 sodium chloride (NS) flush 5-40 mL  5-40 mL IntraVENous PRN  
 balsam peru-castor oiL (VENELEX) ointment   Topical BID Van Lofton MD
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
## 2021-04-08 NOTE — PROGRESS NOTES
End of Shift Note Bedside shift change report given to Patrick Mirza RN (oncoming nurse) by Wili Harris RN (offgoing nurse). Report included the following information SBAR, Kardex, Intake/Output and MAR Shift worked:  [id number] Shift summary and any significant changes:  
  Patient transferred to unit from CCU for comfort care. Patient stable through shift, provided 3 doses of PRN robinul, dilaudid and ativan. Patient's exhibits signs of distress when turned, breathing becomes labored. Hospice Social Worker and Hospice RN met with spouse about hospice care, spouse didn't want to sign at this time. Concerns for physician to address:   
  
Zone phone for oncoming shift:    
  
 
Activity: 
Activity Level: Bed Rest 
Number times ambulated in hallways past shift: 0 Number of times OOB to chair past shift: 0 Cardiac:  
Cardiac Monitoring: No     
Cardiac Rhythm: Normal sinus rhythm Access:  
Current line(s): central line Genitourinary:  
Urinary status: weems Respiratory:  
O2 Device: Nasal cannula Chronic home O2 use?: NO Incentive spirometer at bedside: NO 
  
GI: 
Last Bowel Movement Date: (PTA) Current diet:  DIET NPO Passing flatus: YES Tolerating current diet: YES, NPO Pain Management:  
Patient states pain is manageable on current regimen: YES Skin: 
Cristi Score: 10 Interventions: turn team, float heels and internal/external urinary devices Patient Safety: 
Fall Score: Total Score: 3 Interventions: bed/chair alarm High Fall Risk: Yes Length of Stay: 
Expected LOS: 3d 12h Actual LOS: 9 Wili Harris RN

## 2021-04-08 NOTE — PROGRESS NOTES
End of Shift Note Bedside shift change report given to Marie (oncoming nurse) by Aixa Berrios RN (offgoing nurse). Report included the following information SBAR, Kardex and [facility] Shift worked:  7a-7p Shift summary and any significant changes:  
  pt stable through shift, meds given according to mar, prn dialudid given for respiration, hourly rounding, education done, pt turned Q6 hrs Concerns for physician to address:   
  
Zone phone for oncoming shift:

## 2021-04-08 NOTE — PROGRESS NOTES
Hospitalist Progress Note NAME: Greg Mejia :  1940 MRN:  [id number] Assessment / Plan: 
Out of hospital cardiac arrest 
S/P prolonged CPR Cardiogenic shock Acute respiratory failure post arrest 
Cont' comfort measures only. meds changed to reflex Intubated on admission, remained on mechanical ventilation throughout his hospitalization until he was compassionately extubated on . He did not required sedation medications and remained comatose. Due to poor prognosis for neurological recovery by neurology, family decided for compassionate extubation on . Pt is currently on RA. Medications changed to reflect comfort measures. I spoke to pt's wife on  and she was in agreement with hospice consultation with plans to transition pt to inpt hospice. Hospice consulted, ongoing evaluation Code status: DNR Subjective: Chief Complaint / Reason for Physician Visit Pt in bed, has labored breathing. Daughter was at bedside this AM.  Discussed with RN events overnight. Review of Systems: 
Symptom Y/N Comments  Symptom Y/N Comments Fever/Chills    Chest Pain Poor Appetite    Edema Cough    Abdominal Pain Sputum    Joint Pain SOB/CALHOUN    Pruritis/Rash Nausea/vomit    Tolerating PT/OT Diarrhea    Tolerating Diet Constipation    Other Could NOT obtain due to:   
 
Objective: VITALS:  
Last 24hrs VS reviewed since prior progress note. Most recent are: 
Patient Vitals for the past 24 hrs: 
 Temp Pulse Resp BP SpO2  
21 0735 99.2 °F (37.3 °C) 73 13 (!) 88/60 94 % 21 [address] Intake/Output Summary (Last 24 hours) at 2021 1216 Last data filed at 2021 Gross per 24 hour Intake  Output 525 ml Net -525 ml I had a face to face encounter and independently examined this patient on 2021, as outlined below: PHYSICAL EXAM: 
General: Resting in bed, unresponsive Resp:  Labored breathing CV:  Regular  rhythm,  No edema GI:  Soft, ND Neurologic:  unresponsive Psych:   Not anxious Reviewed most current lab test results and cultures  YES Reviewed most current radiology test results   YES Review and summation of old records today    NO Reviewed patient's current orders and MAR    YES 
PMH/SH reviewed - no change compared to H&P 
________________________________________________________________________ Care Plan discussed with: 
  Comments Patient Family  x   
RN Care Manager Consultant Multidiciplinary team rounds were held today with , nursing, pharmacist and clinical coordinator. Patient's plan of care was discussed; medications were reviewed and discharge planning was addressed. ________________________________________________________________________ Total NON critical care TIME:  20  Minutes Total CRITICAL CARE TIME Spent:   Minutes non procedure based Comments >50% of visit spent in counseling and coordination of care    
________________________________________________________________________ Nancie Robbins MD  
 
Procedures: see electronic medical records for all procedures/Xrays and details which were not copied into this note but were reviewed prior to creation of Plan. LABS: 
I reviewed today's most current labs and imaging studies. Pertinent labs include: No results for input(s): WBC, HGB, HCT, PLT, HGBEXT, HCTEXT, PLTEXT in the last 72 hours. Recent Labs 04/06/21 
7038 * K 4.1 * CO2 27 * BUN 42* CREA 1.52* CA 8.5 MG 2.0 PHOS 3.0 Signed: Nancie Robbins MD

## 2021-04-08 NOTE — PROGRESS NOTES
End of Shift Note Bedside shift change report given to Raji Herrera (oncoming nurse) by Celia Stringer (offgoing nurse). Report included the following information SBAR, Kardex and [facility] Shift worked:  7p-7a Shift summary and any significant changes:  
  Patient was given scheduled medication,, see MAR. Patient was givne Robinul twice for throat congestion, Dilaudid four times and Ativan three times for restlessness, see PRN MAR. Patient's family was present at bedside during my shift. Patient teaching and routine rounding has been done. Patient has been repositioned during my shift. Triple-lumen was flushed and is patent. Concerns for physician to address:   
  
Zone phone for oncoming shift:    
  
 
Activity: 
Activity Level: Bed Rest 
Number times ambulated in hallways past shift: 0 Number of times OOB to chair past shift: 0 Cardiac:  
Cardiac Monitoring: No     
Cardiac Rhythm: Normal sinus rhythm Access:  
Current line(s): central line Genitourinary:  
Urinary status: weems Respiratory:  
O2 Device: Room air Chronic home O2 use?: NO Incentive spirometer at bedside: NO 
  
GI: 
Last Bowel Movement Date: (PTA) Current diet:  DIET NPO Passing flatus: YES Tolerating current diet: YES Pain Management:  
Patient states pain is manageable on current regimen: YES Skin: 
Cristi Score: 10 Interventions: float heels and internal/external urinary devices Patient Safety: 
Fall Score: Total Score: 2 Interventions: bed/chair alarm High Fall Risk: Yes Length of Stay: 
Expected LOS: 3d 12h Actual LOS: [address] [facility]

## 2021-04-09 NOTE — DISCHARGE SUMMARY
Discharge Summary Name: Clay Renteria [id number] YOB: 1940 (Age: 79 y.o.) Date of Admission: 3/29/2021 Date of Discharge: 4/9/2021 Attending Physician: Vinita Victoria MD 
 
Discharge Diagnosis:  
Out of hospital cardiac arrest 
S/P prolonged CPR Cardiogenic shock Acute respiratory failure post arrest 
 
Consultations: 
IP CONSULT TO NEUROLOGY 
IP CONSULT TO PALLIATIVE CARE - PROVIDER Brief Admission History/Reason for Admission Per Lexi Garcia, DO:  
80 y/o M found down after a bike ride. Initially PEA, received epi. Convert to V-fib and received 1 defibrillation. ROSC en route to the ED. Estimated down time was 30-60 mins. Brief Hospital Course by Main Problems:  
Out of hospital cardiac arrest 
S/P prolonged CPR Cardiogenic shock Acute respiratory failure post arrest 
Cont' comfort measures only. meds changed to reflex Intubated on admission, remained on mechanical ventilation throughout his hospitalization until he was compassionately extubated on 4/6. He did not required sedation medications and remained comatose. Due to poor prognosis for neurological recovery by neurology, family decided for compassionate extubation on 4/6. Pt is currently on RA. Medications changed to reflect comfort measures.    I spoke to pt's wife on 4/7 and she was in agreement with hospice consultation with plans to transition pt to inpt hospice.

## 2021-04-09 NOTE — PROGRESS NOTES
Hospitalist Progress Note NAME: Jonothan Cabot :  1940 MRN:  [id number] Assessment / Plan: 
Out of hospital cardiac arrest 
S/P prolonged CPR Cardiogenic shock Acute respiratory failure post arrest 
Cont' comfort measures only. meds changed to reflex Intubated on admission, remained on mechanical ventilation throughout his hospitalization until he was compassionately extubated on . He did not required sedation medications and remained comatose. Due to poor prognosis for neurological recovery by neurology, family decided for compassionate extubation on . Pt is currently on RA. Medications changed to reflect comfort measures. I spoke to pt's wife on  and she was in agreement with hospice consultation with plans to transition pt to inpt hospice. Hospice consulted, ongoing evaluation, hopefully can be transitioned to inpt hospice. Code status: DNR Subjective: Chief Complaint / Reason for Physician Visit Pt unresponsive in bed. NAD. Breathing is labored, unchanged from previous day. Discussed with RN events overnight. Review of Systems: 
Symptom Y/N Comments  Symptom Y/N Comments Fever/Chills    Chest Pain Poor Appetite    Edema Cough    Abdominal Pain Sputum    Joint Pain SOB/CALHOUN    Pruritis/Rash Nausea/vomit    Tolerating PT/OT Diarrhea    Tolerating Diet Constipation    Other Could NOT obtain due to: unresponsive Objective: VITALS:  
Last 24hrs VS reviewed since prior progress note. Most recent are: 
Patient Vitals for the past 24 hrs: 
 Temp Pulse Resp BP SpO2  
21 0749 99.4 °F (37.4 °C) 75 22 103/67 92 % 21 [facility] Intake/Output Summary (Last 24 hours) at 2021 5324 Last data filed at 2021 1100 Gross per 24 hour Intake  Output 1100 ml Net -1100 ml I had a face to face encounter and independently examined this patient on 2021, as outlined below: PHYSICAL EXAM: 
General: Resting in bed, unresponsive Resp:  Labored breathing CV:  Regular  rhythm,  No edema GI:  Soft, ND Neurologic:  unresponsive Psych:   Not anxious Reviewed most current lab test results and cultures  YES Reviewed most current radiology test results   YES Review and summation of old records today    NO Reviewed patient's current orders and MAR    YES 
PMH/SH reviewed - no change compared to H&P 
________________________________________________________________________ Care Plan discussed with: 
  Comments Patient Family RN Care Manager Consultant Multidiciplinary team rounds were held today with , nursing, pharmacist and clinical coordinator. Patient's plan of care was discussed; medications were reviewed and discharge planning was addressed. ________________________________________________________________________ Total NON critical care TIME:  20  Minutes Total CRITICAL CARE TIME Spent:   Minutes non procedure based Comments >50% of visit spent in counseling and coordination of care    
________________________________________________________________________ Dar Lopez MD  
 
Procedures: see electronic medical records for all procedures/Xrays and details which were not copied into this note but were reviewed prior to creation of Plan. LABS: 
I reviewed today's most current labs and imaging studies. Pertinent labs include: No results for input(s): WBC, HGB, HCT, PLT, HGBEXT, HCTEXT, PLTEXT, HGBEXT, HCTEXT, PLTEXT in the last 72 hours. No results for input(s): NA, K, CL, CO2, GLU, BUN, CREA, CA, MG, PHOS, ALB, TBIL, TBILI, ALT, INR, INREXT, INREXT in the last 72 hours. No lab exists for component: SGOT Signed: Dar Lopez MD

## 2021-04-09 NOTE — PROGRESS NOTES
General Palliative Medicine [place]: [phone] ([phone]) [facility]: [phone] ([phone]) Palliative LCSW in to meet with Linda Rosa who is at bedside, holding patient's hand, tearful, but notes she is \"doing fine\". Checked with her and bedside RN, Rachna Francis regarding needs. Linda Rosa declines emotional support at this time. Spoke to Linda Shelley briefly and she acknowledged that patient's passing was peaceful. Patient is a donor via the [employer]. Checked with Linda Rosa if any other family members are planning to come to see patient, as there is a 4 hr window to place body in refrigeration. Linda Shelley noted that none of the other family is in town and no one will be coming. Have let Rachna Francis know this. Linda Rosa is aware that she can call us if needed.

## 2021-04-09 NOTE — PROGRESS NOTES
Patient passed at 0905am. MD Hung Begin time of death. Patient family at bedside. Chaplain Stanton Erps paged and visited with wife at bedside. Nursing sup Agustin Hills informed. Palliative NP and MD Abril Briggs informed. [facility] Anatomical Program informed per wife and stated patient had to be at [facility] within 4 hours.  Wife informed and no other interventions required by the [facility] anatomical program.

## 2021-04-09 NOTE — PROGRESS NOTES
End of Shift Note Bedside shift change report given to Tae Lan (oncoming nurse) by Yasmeen Lee (offgoing nurse). Report included the following information SBAR, Kardex and [facility] Shift worked:  7p-7a Shift summary and any significant changes:  
  Scheduled medications were given, see MAR. Patient was given two doses of Robinul for throat congestion and one dose of Dilaudid for labored breathing, see PRN MAR. Merlin Kyra Central line has been flushed and is patent. Patient has been repositioned and frequent rounding has been done. Concerns for physician to address:   
  
Zone phone for oncoming shift:    
  
 
Activity: 
Activity Level: Bed Rest 
Number times ambulated in hallways past shift: 0 Number of times OOB to chair past shift: 0 Cardiac:  
Cardiac Monitoring: No     
Cardiac Rhythm: Normal sinus rhythm Access:  
Current line(s): central line Genitourinary:  
Urinary status: weems Respiratory:  
O2 Device: Room air Chronic home O2 use?: NO Incentive spirometer at bedside: NO 
  
GI: 
Last Bowel Movement Date: (PTA) Current diet:  DIET NPO Passing flatus: YES Tolerating current diet: YES Pain Management:  
Patient states pain is manageable on current regimen: YES Skin: 
Cristi Score: 12 Interventions: float heels and internal/external urinary devices Patient Safety: 
Fall Score: Total Score: 2 Interventions: bed/chair alarm High Fall Risk: Yes Length of Stay: 
Expected LOS: 3d 12h Actual LOS: [address]

## 2021-04-09 NOTE — PROGRESS NOTES
Responded to page to Oncology when patient . Patient's wife was at bedside. Offered condolences and pastoral presence. Mrs. Clari Dykes sat silently at bedside holding her 's hand for several minutes before sharing pastoral support was not needed. She was receptive to assurance of prayer. SUSAN Orozco, [facility], Staff  [facility]  Paging Service  [phone])

## 2025-08-03 NOTE — PROCEDURES
[address] [facility] [place] [address]   [address] [facility]       Name:  Rafal Caba   MR#:  [id number]   :  1940   Account #:  [de-identified]    Date of Procedure:  2017   Date of Adm:  2017       PROCEDURE: Dobutamine stress echo. INDICATIONS: The patient has an ischemic cardiomyopathy and echo   findings of severe aortic stenosis with low gradient. This is being done   to ascertain whether this is related to his cardiomyopathy or truly   represents severe aortic stenosis. He received a graded infusion of dobutamine beginning at 5 mcg/kg   per minute, increasing to 10, then 15, then 20 mcg per minute. The 20   mcg dose was held for approximately 15 minutes. The patient tolerated   the dobutamine well with some mild head congestion and discomfort in   his legs. There was increased ventricular ectopy at the max dose of   dobutamine. Most surprisingly, the patient felt worse on the   Dobutamine . His heart rate remained rather flat throughout the   infusion but his blood pressure increased from 128/69 to a peak of   160/68. The drip was then discontinued and the patient returned to his   baseline state with no ill effects. In terms of the echo findings, at baseline his ejection fraction is   estimated at about 20% with severe diffuse hypokinesis. His PA   systolic pressure at rest was estimated at 65 mmHg. His resting mean   gradient across his aortic valve was 21 with a peak velocity across the   valve of 3.2. His calculated valve area was 0.8 and the VTI was 67. He   did have mild aortic regurgitation at rest. At the 5 mcg level, basically   there was no significant change in the hemodynamics. At 10 mcg level,   the VTI increased to 74 and the peak velocity across the valve   remained at 3.2 and the mean gradient was 22 across the valve with   the calculated valve area of 0.9 cm sq.  Recordings were not made at   the 15 mcg level but at the 20 mcg level, the patient's mean gradient   increased to 37 mmHg with a peak velocity across the valve of 3.9 to   4.1 meters per second. The calculated valve area of 0.8 remained   stable. The VTI increased to 88. In conclusion, dobutamine stress echo demonstrating increase in   velocity across the aortic valve and increase in mean gradient with stable aortic valve   area calculations throughout, consistent with severe aortic stenosis in   the setting of low-flow, low-EF state.          Beryl Reinoso MD CB / YUSUF   D:  03/24/2017   08:48   T:  03/24/2017   11:01   Job #:  [id number] diagnosis and instructed to come back with any concerns or worsening of condition.  he and was agreeable to the plan as discussed above.  he was given the opportunity to ask questions.  All of the patient's questions were answered.    Procedures   Procedures        Vish Polanco MD  Emergency Medicine                                                       Vish Polanco MD  08/03/25 0900